# Patient Record
Sex: FEMALE | Race: WHITE | NOT HISPANIC OR LATINO | Employment: UNEMPLOYED | URBAN - METROPOLITAN AREA
[De-identification: names, ages, dates, MRNs, and addresses within clinical notes are randomized per-mention and may not be internally consistent; named-entity substitution may affect disease eponyms.]

---

## 2017-11-07 ENCOUNTER — APPOINTMENT (OUTPATIENT)
Dept: LAB | Facility: MEDICAL CENTER | Age: 52
End: 2017-11-07
Payer: COMMERCIAL

## 2017-11-07 ENCOUNTER — TRANSCRIBE ORDERS (OUTPATIENT)
Dept: ADMINISTRATIVE | Facility: HOSPITAL | Age: 52
End: 2017-11-07

## 2017-11-07 DIAGNOSIS — Z00.00 ROUTINE GENERAL MEDICAL EXAMINATION AT A HEALTH CARE FACILITY: Primary | ICD-10-CM

## 2017-11-07 DIAGNOSIS — E66.09 EXOGENOUS OBESITY: Primary | ICD-10-CM

## 2017-11-07 DIAGNOSIS — N23 KIDNEY PAIN: ICD-10-CM

## 2017-11-07 DIAGNOSIS — E55.9 VITAMIN D DEFICIENCY DISEASE: ICD-10-CM

## 2017-11-07 DIAGNOSIS — Z00.00 ROUTINE GENERAL MEDICAL EXAMINATION AT A HEALTH CARE FACILITY: ICD-10-CM

## 2017-11-07 LAB
25(OH)D3 SERPL-MCNC: 28.8 NG/ML (ref 30–100)
ALBUMIN SERPL BCP-MCNC: 4 G/DL (ref 3.5–5)
ALP SERPL-CCNC: 70 U/L (ref 46–116)
ALT SERPL W P-5'-P-CCNC: 30 U/L (ref 12–78)
ANION GAP SERPL CALCULATED.3IONS-SCNC: 5 MMOL/L (ref 4–13)
AST SERPL W P-5'-P-CCNC: 22 U/L (ref 5–45)
BACTERIA UR QL AUTO: ABNORMAL /HPF
BILIRUB SERPL-MCNC: 0.77 MG/DL (ref 0.2–1)
BILIRUB UR QL STRIP: NEGATIVE
BUN SERPL-MCNC: 20 MG/DL (ref 5–25)
CALCIUM SERPL-MCNC: 9.2 MG/DL (ref 8.3–10.1)
CHLORIDE SERPL-SCNC: 105 MMOL/L (ref 100–108)
CHOLEST SERPL-MCNC: 152 MG/DL (ref 50–200)
CLARITY UR: CLEAR
CO2 SERPL-SCNC: 28 MMOL/L (ref 21–32)
COLOR UR: YELLOW
CREAT SERPL-MCNC: 0.83 MG/DL (ref 0.6–1.3)
ERYTHROCYTE [DISTWIDTH] IN BLOOD BY AUTOMATED COUNT: 13 % (ref 11.6–15.1)
GFR SERPL CREATININE-BSD FRML MDRD: 81 ML/MIN/1.73SQ M
GLUCOSE P FAST SERPL-MCNC: 86 MG/DL (ref 65–99)
GLUCOSE UR STRIP-MCNC: NEGATIVE MG/DL
HCT VFR BLD AUTO: 40.7 % (ref 34.8–46.1)
HDLC SERPL-MCNC: 57 MG/DL (ref 40–60)
HGB BLD-MCNC: 13.8 G/DL (ref 11.5–15.4)
HGB UR QL STRIP.AUTO: NEGATIVE
HYALINE CASTS #/AREA URNS LPF: ABNORMAL /LPF
KETONES UR STRIP-MCNC: NEGATIVE MG/DL
LDLC SERPL CALC-MCNC: 82 MG/DL (ref 0–100)
LEUKOCYTE ESTERASE UR QL STRIP: ABNORMAL
MCH RBC QN AUTO: 31.2 PG (ref 26.8–34.3)
MCHC RBC AUTO-ENTMCNC: 33.9 G/DL (ref 31.4–37.4)
MCV RBC AUTO: 92 FL (ref 82–98)
NITRITE UR QL STRIP: NEGATIVE
NON-SQ EPI CELLS URNS QL MICRO: ABNORMAL /HPF
PH UR STRIP.AUTO: 6 [PH] (ref 4.5–8)
PLATELET # BLD AUTO: 217 THOUSANDS/UL (ref 149–390)
PMV BLD AUTO: 12.9 FL (ref 8.9–12.7)
POTASSIUM SERPL-SCNC: 4.2 MMOL/L (ref 3.5–5.3)
PROT SERPL-MCNC: 7.8 G/DL (ref 6.4–8.2)
PROT UR STRIP-MCNC: NEGATIVE MG/DL
RBC # BLD AUTO: 4.43 MILLION/UL (ref 3.81–5.12)
RBC #/AREA URNS AUTO: ABNORMAL /HPF
SODIUM SERPL-SCNC: 138 MMOL/L (ref 136–145)
SP GR UR STRIP.AUTO: 1.02 (ref 1–1.03)
T4 FREE SERPL-MCNC: 1 NG/DL (ref 0.76–1.46)
TRIGL SERPL-MCNC: 63 MG/DL
TSH SERPL DL<=0.05 MIU/L-ACNC: 6.79 UIU/ML (ref 0.36–3.74)
UROBILINOGEN UR QL STRIP.AUTO: 0.2 E.U./DL
WBC # BLD AUTO: 5.39 THOUSAND/UL (ref 4.31–10.16)
WBC #/AREA URNS AUTO: ABNORMAL /HPF

## 2017-11-07 PROCEDURE — 80053 COMPREHEN METABOLIC PANEL: CPT | Performed by: NURSE PRACTITIONER

## 2017-11-07 PROCEDURE — 84439 ASSAY OF FREE THYROXINE: CPT | Performed by: NURSE PRACTITIONER

## 2017-11-07 PROCEDURE — 84443 ASSAY THYROID STIM HORMONE: CPT | Performed by: NURSE PRACTITIONER

## 2017-11-07 PROCEDURE — 82306 VITAMIN D 25 HYDROXY: CPT | Performed by: NURSE PRACTITIONER

## 2017-11-07 PROCEDURE — 80061 LIPID PANEL: CPT | Performed by: NURSE PRACTITIONER

## 2017-11-07 PROCEDURE — 87086 URINE CULTURE/COLONY COUNT: CPT

## 2017-11-07 PROCEDURE — 81001 URINALYSIS AUTO W/SCOPE: CPT

## 2017-11-07 PROCEDURE — 85027 COMPLETE CBC AUTOMATED: CPT | Performed by: NURSE PRACTITIONER

## 2017-11-07 PROCEDURE — 36415 COLL VENOUS BLD VENIPUNCTURE: CPT | Performed by: NURSE PRACTITIONER

## 2017-11-08 LAB — BACTERIA UR CULT: NORMAL

## 2018-09-20 ENCOUNTER — TRANSCRIBE ORDERS (OUTPATIENT)
Dept: ADMINISTRATIVE | Facility: HOSPITAL | Age: 53
End: 2018-09-20

## 2018-09-20 ENCOUNTER — APPOINTMENT (OUTPATIENT)
Dept: LAB | Facility: MEDICAL CENTER | Age: 53
End: 2018-09-20
Payer: COMMERCIAL

## 2018-09-20 DIAGNOSIS — R39.15 URGENCY OF URINATION: ICD-10-CM

## 2018-09-20 DIAGNOSIS — R73.9 BLOOD GLUCOSE ELEVATED: Primary | ICD-10-CM

## 2018-09-20 LAB
ALBUMIN SERPL BCP-MCNC: 3.9 G/DL (ref 3.5–5)
ALP SERPL-CCNC: 78 U/L (ref 46–116)
ALT SERPL W P-5'-P-CCNC: 31 U/L (ref 12–78)
ANION GAP SERPL CALCULATED.3IONS-SCNC: 5 MMOL/L (ref 4–13)
AST SERPL W P-5'-P-CCNC: 24 U/L (ref 5–45)
BACTERIA UR QL AUTO: ABNORMAL /HPF
BASOPHILS # BLD AUTO: 0.03 THOUSANDS/ΜL (ref 0–0.1)
BASOPHILS NFR BLD AUTO: 1 % (ref 0–1)
BILIRUB SERPL-MCNC: 0.81 MG/DL (ref 0.2–1)
BILIRUB UR QL STRIP: NEGATIVE
BUN SERPL-MCNC: 19 MG/DL (ref 5–25)
CALCIUM SERPL-MCNC: 9.2 MG/DL (ref 8.3–10.1)
CHLORIDE SERPL-SCNC: 107 MMOL/L (ref 100–108)
CLARITY UR: ABNORMAL
CO2 SERPL-SCNC: 26 MMOL/L (ref 21–32)
COLOR UR: ABNORMAL
CREAT SERPL-MCNC: 0.82 MG/DL (ref 0.6–1.3)
EOSINOPHIL # BLD AUTO: 0.1 THOUSAND/ΜL (ref 0–0.61)
EOSINOPHIL NFR BLD AUTO: 2 % (ref 0–6)
ERYTHROCYTE [DISTWIDTH] IN BLOOD BY AUTOMATED COUNT: 12.3 % (ref 11.6–15.1)
GFR SERPL CREATININE-BSD FRML MDRD: 82 ML/MIN/1.73SQ M
GLUCOSE P FAST SERPL-MCNC: 93 MG/DL (ref 65–99)
GLUCOSE UR STRIP-MCNC: NEGATIVE MG/DL
HCT VFR BLD AUTO: 42.8 % (ref 34.8–46.1)
HGB BLD-MCNC: 14.2 G/DL (ref 11.5–15.4)
HGB UR QL STRIP.AUTO: NEGATIVE
IMM GRANULOCYTES # BLD AUTO: 0.02 THOUSAND/UL (ref 0–0.2)
IMM GRANULOCYTES NFR BLD AUTO: 0 % (ref 0–2)
KETONES UR STRIP-MCNC: NEGATIVE MG/DL
LEUKOCYTE ESTERASE UR QL STRIP: ABNORMAL
LYMPHOCYTES # BLD AUTO: 1.47 THOUSANDS/ΜL (ref 0.6–4.47)
LYMPHOCYTES NFR BLD AUTO: 27 % (ref 14–44)
MCH RBC QN AUTO: 30 PG (ref 26.8–34.3)
MCHC RBC AUTO-ENTMCNC: 33.2 G/DL (ref 31.4–37.4)
MCV RBC AUTO: 91 FL (ref 82–98)
MONOCYTES # BLD AUTO: 0.46 THOUSAND/ΜL (ref 0.17–1.22)
MONOCYTES NFR BLD AUTO: 9 % (ref 4–12)
NEUTROPHILS # BLD AUTO: 3.29 THOUSANDS/ΜL (ref 1.85–7.62)
NEUTS SEG NFR BLD AUTO: 61 % (ref 43–75)
NITRITE UR QL STRIP: NEGATIVE
NON-SQ EPI CELLS URNS QL MICRO: ABNORMAL /HPF
NRBC BLD AUTO-RTO: 0 /100 WBCS
PH UR STRIP.AUTO: 6 [PH] (ref 4.5–8)
PLATELET # BLD AUTO: 270 THOUSANDS/UL (ref 149–390)
PMV BLD AUTO: 11.5 FL (ref 8.9–12.7)
POTASSIUM SERPL-SCNC: 4.2 MMOL/L (ref 3.5–5.3)
PROT SERPL-MCNC: 7.9 G/DL (ref 6.4–8.2)
PROT UR STRIP-MCNC: ABNORMAL MG/DL
RBC # BLD AUTO: 4.73 MILLION/UL (ref 3.81–5.12)
RBC #/AREA URNS AUTO: ABNORMAL /HPF
SODIUM SERPL-SCNC: 138 MMOL/L (ref 136–145)
SP GR UR STRIP.AUTO: 1.02 (ref 1–1.03)
UROBILINOGEN UR QL STRIP.AUTO: 0.2 E.U./DL
WBC # BLD AUTO: 5.37 THOUSAND/UL (ref 4.31–10.16)
WBC #/AREA URNS AUTO: ABNORMAL /HPF

## 2018-09-20 PROCEDURE — 81001 URINALYSIS AUTO W/SCOPE: CPT | Performed by: NURSE PRACTITIONER

## 2018-09-20 PROCEDURE — 87086 URINE CULTURE/COLONY COUNT: CPT | Performed by: NURSE PRACTITIONER

## 2018-09-20 PROCEDURE — 85025 COMPLETE CBC W/AUTO DIFF WBC: CPT | Performed by: NURSE PRACTITIONER

## 2018-09-20 PROCEDURE — 80053 COMPREHEN METABOLIC PANEL: CPT | Performed by: NURSE PRACTITIONER

## 2018-09-20 PROCEDURE — 36415 COLL VENOUS BLD VENIPUNCTURE: CPT | Performed by: NURSE PRACTITIONER

## 2018-09-20 PROCEDURE — 83036 HEMOGLOBIN GLYCOSYLATED A1C: CPT | Performed by: NURSE PRACTITIONER

## 2018-09-21 LAB
BACTERIA UR CULT: NORMAL
EST. AVERAGE GLUCOSE BLD GHB EST-MCNC: 108 MG/DL
HBA1C MFR BLD: 5.4 % (ref 4.2–6.3)

## 2019-08-16 ENCOUNTER — TRANSCRIBE ORDERS (OUTPATIENT)
Dept: ADMINISTRATIVE | Facility: HOSPITAL | Age: 54
End: 2019-08-16

## 2019-08-16 ENCOUNTER — APPOINTMENT (OUTPATIENT)
Dept: LAB | Facility: MEDICAL CENTER | Age: 54
End: 2019-08-16
Payer: COMMERCIAL

## 2019-08-16 DIAGNOSIS — Z13.1 SCREENING FOR DIABETES MELLITUS: ICD-10-CM

## 2019-08-16 DIAGNOSIS — E03.9 HYPOTHYROIDISM, UNSPECIFIED TYPE: Primary | ICD-10-CM

## 2019-08-16 DIAGNOSIS — D50.9 IRON DEFICIENCY ANEMIA, UNSPECIFIED IRON DEFICIENCY ANEMIA TYPE: ICD-10-CM

## 2019-08-16 DIAGNOSIS — D64.9 ANEMIA, UNSPECIFIED TYPE: ICD-10-CM

## 2019-08-16 LAB
ALBUMIN SERPL BCP-MCNC: 4.1 G/DL (ref 3.5–5)
ALP SERPL-CCNC: 80 U/L (ref 46–116)
ALT SERPL W P-5'-P-CCNC: 26 U/L (ref 12–78)
ANION GAP SERPL CALCULATED.3IONS-SCNC: 7 MMOL/L (ref 4–13)
AST SERPL W P-5'-P-CCNC: 18 U/L (ref 5–45)
BILIRUB DIRECT SERPL-MCNC: 0.21 MG/DL (ref 0–0.2)
BILIRUB SERPL-MCNC: 0.81 MG/DL (ref 0.2–1)
BUN SERPL-MCNC: 17 MG/DL (ref 5–25)
CALCIUM SERPL-MCNC: 9.3 MG/DL (ref 8.3–10.1)
CHLORIDE SERPL-SCNC: 108 MMOL/L (ref 100–108)
CO2 SERPL-SCNC: 26 MMOL/L (ref 21–32)
CREAT SERPL-MCNC: 0.79 MG/DL (ref 0.6–1.3)
ERYTHROCYTE [DISTWIDTH] IN BLOOD BY AUTOMATED COUNT: 12.9 % (ref 11.6–15.1)
GFR SERPL CREATININE-BSD FRML MDRD: 85 ML/MIN/1.73SQ M
GLUCOSE SERPL-MCNC: 81 MG/DL (ref 65–140)
HCT VFR BLD AUTO: 41.4 % (ref 34.8–46.1)
HGB BLD-MCNC: 13.5 G/DL (ref 11.5–15.4)
MCH RBC QN AUTO: 30.5 PG (ref 26.8–34.3)
MCHC RBC AUTO-ENTMCNC: 32.6 G/DL (ref 31.4–37.4)
MCV RBC AUTO: 94 FL (ref 82–98)
PLATELET # BLD AUTO: 245 THOUSANDS/UL (ref 149–390)
PMV BLD AUTO: 12.3 FL (ref 8.9–12.7)
POTASSIUM SERPL-SCNC: 4.1 MMOL/L (ref 3.5–5.3)
PROT SERPL-MCNC: 7.7 G/DL (ref 6.4–8.2)
RBC # BLD AUTO: 4.42 MILLION/UL (ref 3.81–5.12)
SODIUM SERPL-SCNC: 141 MMOL/L (ref 136–145)
TSH SERPL DL<=0.05 MIU/L-ACNC: 2.38 UIU/ML (ref 0.36–3.74)
WBC # BLD AUTO: 10.98 THOUSAND/UL (ref 4.31–10.16)

## 2019-08-16 PROCEDURE — 84443 ASSAY THYROID STIM HORMONE: CPT | Performed by: INTERNAL MEDICINE

## 2019-08-16 PROCEDURE — 36415 COLL VENOUS BLD VENIPUNCTURE: CPT | Performed by: INTERNAL MEDICINE

## 2019-08-16 PROCEDURE — 85027 COMPLETE CBC AUTOMATED: CPT | Performed by: INTERNAL MEDICINE

## 2019-08-16 PROCEDURE — 80076 HEPATIC FUNCTION PANEL: CPT | Performed by: INTERNAL MEDICINE

## 2019-08-16 PROCEDURE — 80048 BASIC METABOLIC PNL TOTAL CA: CPT | Performed by: INTERNAL MEDICINE

## 2020-11-10 ENCOUNTER — OFFICE VISIT (OUTPATIENT)
Dept: FAMILY MEDICINE CLINIC | Facility: CLINIC | Age: 55
End: 2020-11-10
Payer: COMMERCIAL

## 2020-11-10 VITALS
SYSTOLIC BLOOD PRESSURE: 132 MMHG | OXYGEN SATURATION: 98 % | HEART RATE: 75 BPM | RESPIRATION RATE: 18 BRPM | DIASTOLIC BLOOD PRESSURE: 88 MMHG | WEIGHT: 293 LBS | HEIGHT: 63 IN | TEMPERATURE: 97.9 F | BODY MASS INDEX: 51.91 KG/M2

## 2020-11-10 DIAGNOSIS — G89.29 CHRONIC KNEE PAIN, UNSPECIFIED LATERALITY: ICD-10-CM

## 2020-11-10 DIAGNOSIS — R73.9 HYPERGLYCEMIA: ICD-10-CM

## 2020-11-10 DIAGNOSIS — G89.29 OTHER CHRONIC PAIN: ICD-10-CM

## 2020-11-10 DIAGNOSIS — E66.01 CLASS 3 SEVERE OBESITY WITH BODY MASS INDEX (BMI) OF 60.0 TO 69.9 IN ADULT, UNSPECIFIED OBESITY TYPE, UNSPECIFIED WHETHER SERIOUS COMORBIDITY PRESENT (HCC): Primary | ICD-10-CM

## 2020-11-10 DIAGNOSIS — M25.569 CHRONIC KNEE PAIN, UNSPECIFIED LATERALITY: ICD-10-CM

## 2020-11-10 DIAGNOSIS — M79.7 FIBROMYALGIA: ICD-10-CM

## 2020-11-10 DIAGNOSIS — R53.83 OTHER FATIGUE: ICD-10-CM

## 2020-11-10 PROCEDURE — 99204 OFFICE O/P NEW MOD 45 MIN: CPT | Performed by: NURSE PRACTITIONER

## 2020-11-10 PROCEDURE — 3725F SCREEN DEPRESSION PERFORMED: CPT | Performed by: NURSE PRACTITIONER

## 2020-11-10 RX ORDER — DULOXETINE 40 MG/1
40 CAPSULE, DELAYED RELEASE ORAL
COMMUNITY
End: 2020-11-25 | Stop reason: SDUPTHER

## 2020-11-10 RX ORDER — ALBUTEROL SULFATE 2.5 MG/3ML
1 SOLUTION RESPIRATORY (INHALATION)
COMMUNITY
Start: 2013-02-19

## 2020-11-10 RX ORDER — IPRATROPIUM/ALBUTEROL SULFATE 20-100 MCG
MIST INHALER (GRAM) INHALATION
COMMUNITY
Start: 2015-07-09

## 2020-11-10 RX ORDER — TRAMADOL HYDROCHLORIDE 50 MG/1
1 TABLET ORAL EVERY 6 HOURS PRN
COMMUNITY
End: 2021-02-26 | Stop reason: SDUPTHER

## 2020-11-11 ENCOUNTER — LAB (OUTPATIENT)
Dept: LAB | Facility: MEDICAL CENTER | Age: 55
End: 2020-11-11
Payer: COMMERCIAL

## 2020-11-11 DIAGNOSIS — E66.01 CLASS 3 SEVERE OBESITY WITH BODY MASS INDEX (BMI) OF 60.0 TO 69.9 IN ADULT, UNSPECIFIED OBESITY TYPE, UNSPECIFIED WHETHER SERIOUS COMORBIDITY PRESENT (HCC): ICD-10-CM

## 2020-11-11 DIAGNOSIS — R73.9 HYPERGLYCEMIA: ICD-10-CM

## 2020-11-11 DIAGNOSIS — R53.83 OTHER FATIGUE: ICD-10-CM

## 2020-11-11 LAB
ALBUMIN SERPL BCP-MCNC: 4 G/DL (ref 3.5–5)
ALP SERPL-CCNC: 74 U/L (ref 46–116)
ALT SERPL W P-5'-P-CCNC: 33 U/L (ref 12–78)
ANION GAP SERPL CALCULATED.3IONS-SCNC: 3 MMOL/L (ref 4–13)
AST SERPL W P-5'-P-CCNC: 24 U/L (ref 5–45)
BASOPHILS # BLD AUTO: 0.06 THOUSANDS/ΜL (ref 0–0.1)
BASOPHILS NFR BLD AUTO: 1 % (ref 0–1)
BILIRUB SERPL-MCNC: 0.71 MG/DL (ref 0.2–1)
BUN SERPL-MCNC: 18 MG/DL (ref 5–25)
CALCIUM SERPL-MCNC: 9.9 MG/DL (ref 8.3–10.1)
CHLORIDE SERPL-SCNC: 107 MMOL/L (ref 100–108)
CHOLEST SERPL-MCNC: 162 MG/DL (ref 50–200)
CO2 SERPL-SCNC: 30 MMOL/L (ref 21–32)
CREAT SERPL-MCNC: 1.02 MG/DL (ref 0.6–1.3)
CREAT UR-MCNC: 229 MG/DL
EOSINOPHIL # BLD AUTO: 0.19 THOUSAND/ΜL (ref 0–0.61)
EOSINOPHIL NFR BLD AUTO: 3 % (ref 0–6)
ERYTHROCYTE [DISTWIDTH] IN BLOOD BY AUTOMATED COUNT: 12.8 % (ref 11.6–15.1)
EST. AVERAGE GLUCOSE BLD GHB EST-MCNC: 114 MG/DL
GFR SERPL CREATININE-BSD FRML MDRD: 62 ML/MIN/1.73SQ M
GLUCOSE P FAST SERPL-MCNC: 93 MG/DL (ref 65–99)
HBA1C MFR BLD: 5.6 %
HCT VFR BLD AUTO: 43.8 % (ref 34.8–46.1)
HDLC SERPL-MCNC: 65 MG/DL
HGB BLD-MCNC: 14.4 G/DL (ref 11.5–15.4)
IMM GRANULOCYTES # BLD AUTO: 0.03 THOUSAND/UL (ref 0–0.2)
IMM GRANULOCYTES NFR BLD AUTO: 1 % (ref 0–2)
LDLC SERPL CALC-MCNC: 84 MG/DL (ref 0–100)
LYMPHOCYTES # BLD AUTO: 1.73 THOUSANDS/ΜL (ref 0.6–4.47)
LYMPHOCYTES NFR BLD AUTO: 28 % (ref 14–44)
MCH RBC QN AUTO: 31.2 PG (ref 26.8–34.3)
MCHC RBC AUTO-ENTMCNC: 32.9 G/DL (ref 31.4–37.4)
MCV RBC AUTO: 95 FL (ref 82–98)
MICROALBUMIN UR-MCNC: 8 MG/L (ref 0–20)
MICROALBUMIN/CREAT 24H UR: 3 MG/G CREATININE (ref 0–30)
MONOCYTES # BLD AUTO: 0.56 THOUSAND/ΜL (ref 0.17–1.22)
MONOCYTES NFR BLD AUTO: 9 % (ref 4–12)
NEUTROPHILS # BLD AUTO: 3.71 THOUSANDS/ΜL (ref 1.85–7.62)
NEUTS SEG NFR BLD AUTO: 58 % (ref 43–75)
NONHDLC SERPL-MCNC: 97 MG/DL
NRBC BLD AUTO-RTO: 0 /100 WBCS
PLATELET # BLD AUTO: 260 THOUSANDS/UL (ref 149–390)
PMV BLD AUTO: 11.4 FL (ref 8.9–12.7)
POTASSIUM SERPL-SCNC: 4.2 MMOL/L (ref 3.5–5.3)
PROT SERPL-MCNC: 7.9 G/DL (ref 6.4–8.2)
RBC # BLD AUTO: 4.62 MILLION/UL (ref 3.81–5.12)
SODIUM SERPL-SCNC: 140 MMOL/L (ref 136–145)
T4 FREE SERPL-MCNC: 0.87 NG/DL (ref 0.76–1.46)
TRIGL SERPL-MCNC: 63 MG/DL
TSH SERPL DL<=0.05 MIU/L-ACNC: 6.24 UIU/ML (ref 0.36–3.74)
WBC # BLD AUTO: 6.28 THOUSAND/UL (ref 4.31–10.16)

## 2020-11-11 PROCEDURE — 80061 LIPID PANEL: CPT

## 2020-11-11 PROCEDURE — 85025 COMPLETE CBC W/AUTO DIFF WBC: CPT

## 2020-11-11 PROCEDURE — 84439 ASSAY OF FREE THYROXINE: CPT

## 2020-11-11 PROCEDURE — 84443 ASSAY THYROID STIM HORMONE: CPT

## 2020-11-11 PROCEDURE — 80053 COMPREHEN METABOLIC PANEL: CPT

## 2020-11-11 PROCEDURE — 82043 UR ALBUMIN QUANTITATIVE: CPT | Performed by: NURSE PRACTITIONER

## 2020-11-11 PROCEDURE — 36415 COLL VENOUS BLD VENIPUNCTURE: CPT

## 2020-11-11 PROCEDURE — 83036 HEMOGLOBIN GLYCOSYLATED A1C: CPT

## 2020-11-11 PROCEDURE — 82570 ASSAY OF URINE CREATININE: CPT | Performed by: NURSE PRACTITIONER

## 2020-11-17 ENCOUNTER — OFFICE VISIT (OUTPATIENT)
Dept: FAMILY MEDICINE CLINIC | Facility: CLINIC | Age: 55
End: 2020-11-17
Payer: COMMERCIAL

## 2020-11-17 VITALS
HEART RATE: 69 BPM | WEIGHT: 293 LBS | RESPIRATION RATE: 16 BRPM | TEMPERATURE: 97.5 F | OXYGEN SATURATION: 97 % | DIASTOLIC BLOOD PRESSURE: 82 MMHG | SYSTOLIC BLOOD PRESSURE: 122 MMHG | BODY MASS INDEX: 51.91 KG/M2 | HEIGHT: 63 IN

## 2020-11-17 DIAGNOSIS — E01.0 THYROMEGALY: ICD-10-CM

## 2020-11-17 DIAGNOSIS — L71.9 ROSACEA, ACNE: ICD-10-CM

## 2020-11-17 DIAGNOSIS — Z09 FOLLOW UP: Primary | ICD-10-CM

## 2020-11-17 DIAGNOSIS — E03.9 HYPOTHYROIDISM, UNSPECIFIED TYPE: ICD-10-CM

## 2020-11-17 DIAGNOSIS — R06.2 WHEEZING ON INSPIRATION: ICD-10-CM

## 2020-11-17 PROCEDURE — 1036F TOBACCO NON-USER: CPT | Performed by: NURSE PRACTITIONER

## 2020-11-17 PROCEDURE — 3008F BODY MASS INDEX DOCD: CPT | Performed by: NURSE PRACTITIONER

## 2020-11-17 PROCEDURE — 99214 OFFICE O/P EST MOD 30 MIN: CPT | Performed by: NURSE PRACTITIONER

## 2020-11-17 RX ORDER — ALBUTEROL SULFATE 90 UG/1
2 AEROSOL, METERED RESPIRATORY (INHALATION) EVERY 6 HOURS PRN
Qty: 2 INHALER | Refills: 1 | Status: SHIPPED | OUTPATIENT
Start: 2020-11-17

## 2020-11-17 RX ORDER — CLINDAMYCIN PHOSPHATE 10 MG/G
GEL TOPICAL 2 TIMES DAILY
Qty: 30 G | Refills: 1 | Status: SHIPPED | OUTPATIENT
Start: 2020-11-17 | End: 2021-02-26 | Stop reason: CLARIF

## 2020-11-17 RX ORDER — LEVOTHYROXINE SODIUM 0.05 MG/1
50 TABLET ORAL
Qty: 90 TABLET | Refills: 1 | Status: SHIPPED | OUTPATIENT
Start: 2020-11-17 | End: 2021-05-14

## 2020-11-25 ENCOUNTER — HOSPITAL ENCOUNTER (OUTPATIENT)
Dept: RADIOLOGY | Facility: MEDICAL CENTER | Age: 55
Discharge: HOME | End: 2020-11-25
Payer: COMMERCIAL

## 2020-11-25 DIAGNOSIS — E03.9 HYPOTHYROIDISM, UNSPECIFIED TYPE: ICD-10-CM

## 2020-11-25 DIAGNOSIS — F32.0 CURRENT MILD EPISODE OF MAJOR DEPRESSIVE DISORDER, UNSPECIFIED WHETHER RECURRENT (HCC): Primary | ICD-10-CM

## 2020-11-25 DIAGNOSIS — E01.0 THYROMEGALY: ICD-10-CM

## 2020-11-25 PROCEDURE — 76536 US EXAM OF HEAD AND NECK: CPT

## 2020-11-25 RX ORDER — DULOXETINE 40 MG/1
40 CAPSULE, DELAYED RELEASE ORAL DAILY
Qty: 30 CAPSULE | Refills: 1 | Status: SHIPPED | OUTPATIENT
Start: 2020-11-25 | End: 2021-01-28 | Stop reason: SDUPTHER

## 2020-12-09 ENCOUNTER — TELEMEDICINE (OUTPATIENT)
Dept: BARIATRICS | Facility: CLINIC | Age: 55
End: 2020-12-09
Payer: COMMERCIAL

## 2020-12-09 VITALS — WEIGHT: 293 LBS | HEIGHT: 63 IN | BODY MASS INDEX: 51.91 KG/M2

## 2020-12-09 DIAGNOSIS — E03.9 HYPOTHYROID: ICD-10-CM

## 2020-12-09 DIAGNOSIS — M79.7 FIBROMYALGIA: ICD-10-CM

## 2020-12-09 DIAGNOSIS — G47.33 OSA (OBSTRUCTIVE SLEEP APNEA): ICD-10-CM

## 2020-12-09 DIAGNOSIS — E66.01 MORBID OBESITY WITH BMI OF 60.0-69.9, ADULT (HCC): Primary | ICD-10-CM

## 2020-12-09 DIAGNOSIS — F41.9 ANXIETY: ICD-10-CM

## 2020-12-09 DIAGNOSIS — F32.A DEPRESSION: ICD-10-CM

## 2020-12-09 DIAGNOSIS — J45.909 ASTHMA: ICD-10-CM

## 2020-12-09 DIAGNOSIS — J44.9 COPD (CHRONIC OBSTRUCTIVE PULMONARY DISEASE) (HCC): ICD-10-CM

## 2020-12-09 DIAGNOSIS — K21.9 GERD (GASTROESOPHAGEAL REFLUX DISEASE): ICD-10-CM

## 2020-12-09 PROCEDURE — 3008F BODY MASS INDEX DOCD: CPT | Performed by: INTERNAL MEDICINE

## 2020-12-09 PROCEDURE — 99244 OFF/OP CNSLTJ NEW/EST MOD 40: CPT | Performed by: INTERNAL MEDICINE

## 2020-12-09 PROCEDURE — 1036F TOBACCO NON-USER: CPT | Performed by: INTERNAL MEDICINE

## 2020-12-09 RX ORDER — OMEPRAZOLE 20 MG/1
20 CAPSULE, DELAYED RELEASE ORAL DAILY
COMMUNITY

## 2021-01-27 DIAGNOSIS — F32.0 CURRENT MILD EPISODE OF MAJOR DEPRESSIVE DISORDER, UNSPECIFIED WHETHER RECURRENT (HCC): ICD-10-CM

## 2021-01-28 DIAGNOSIS — F32.0 CURRENT MILD EPISODE OF MAJOR DEPRESSIVE DISORDER, UNSPECIFIED WHETHER RECURRENT (HCC): ICD-10-CM

## 2021-01-28 RX ORDER — DULOXETINE 40 MG/1
40 CAPSULE, DELAYED RELEASE ORAL DAILY
Qty: 90 CAPSULE | Refills: 1 | Status: SHIPPED | OUTPATIENT
Start: 2021-01-28 | End: 2021-05-26 | Stop reason: SDUPTHER

## 2021-01-28 RX ORDER — DULOXETINE 40 MG/1
CAPSULE, DELAYED RELEASE ORAL
Qty: 15 CAPSULE | Refills: 3 | OUTPATIENT
Start: 2021-01-28

## 2021-02-17 ENCOUNTER — LAB (OUTPATIENT)
Dept: LAB | Facility: MEDICAL CENTER | Age: 56
End: 2021-02-17
Payer: COMMERCIAL

## 2021-02-17 DIAGNOSIS — E03.9 HYPOTHYROIDISM, UNSPECIFIED TYPE: ICD-10-CM

## 2021-02-17 DIAGNOSIS — E66.01 MORBID OBESITY WITH BMI OF 60.0-69.9, ADULT (HCC): ICD-10-CM

## 2021-02-17 LAB
25(OH)D3 SERPL-MCNC: 24.8 NG/ML (ref 30–100)
T4 FREE SERPL-MCNC: 1.03 NG/DL (ref 0.76–1.46)
TSH SERPL DL<=0.05 MIU/L-ACNC: 3.84 UIU/ML (ref 0.36–3.74)

## 2021-02-17 PROCEDURE — 36415 COLL VENOUS BLD VENIPUNCTURE: CPT

## 2021-02-17 PROCEDURE — 86376 MICROSOMAL ANTIBODY EACH: CPT

## 2021-02-17 PROCEDURE — 84439 ASSAY OF FREE THYROXINE: CPT

## 2021-02-17 PROCEDURE — 82306 VITAMIN D 25 HYDROXY: CPT

## 2021-02-17 PROCEDURE — 84443 ASSAY THYROID STIM HORMONE: CPT

## 2021-02-18 LAB — THYROPEROXIDASE AB SERPL-ACNC: <9 IU/ML (ref 0–34)

## 2021-02-26 ENCOUNTER — OFFICE VISIT (OUTPATIENT)
Dept: FAMILY MEDICINE CLINIC | Facility: CLINIC | Age: 56
End: 2021-02-26
Payer: COMMERCIAL

## 2021-02-26 VITALS
SYSTOLIC BLOOD PRESSURE: 116 MMHG | DIASTOLIC BLOOD PRESSURE: 64 MMHG | HEIGHT: 63 IN | OXYGEN SATURATION: 96 % | WEIGHT: 293 LBS | TEMPERATURE: 98.4 F | BODY MASS INDEX: 51.91 KG/M2 | RESPIRATION RATE: 16 BRPM | HEART RATE: 82 BPM

## 2021-02-26 DIAGNOSIS — Z00.00 ANNUAL PHYSICAL EXAM: Primary | ICD-10-CM

## 2021-02-26 DIAGNOSIS — E66.01 CLASS 3 SEVERE OBESITY WITHOUT SERIOUS COMORBIDITY WITH BODY MASS INDEX (BMI) OF 60.0 TO 69.9 IN ADULT, UNSPECIFIED OBESITY TYPE (HCC): ICD-10-CM

## 2021-02-26 DIAGNOSIS — M54.40 LOW BACK PAIN WITH SCIATICA, SCIATICA LATERALITY UNSPECIFIED, UNSPECIFIED BACK PAIN LATERALITY, UNSPECIFIED CHRONICITY: Primary | ICD-10-CM

## 2021-02-26 DIAGNOSIS — Z12.4 SCREENING FOR CERVICAL CANCER: ICD-10-CM

## 2021-02-26 DIAGNOSIS — Z12.11 SCREENING FOR COLORECTAL CANCER: ICD-10-CM

## 2021-02-26 DIAGNOSIS — Z23 ENCOUNTER FOR IMMUNIZATION: ICD-10-CM

## 2021-02-26 DIAGNOSIS — Z12.31 ENCOUNTER FOR SCREENING MAMMOGRAM FOR MALIGNANT NEOPLASM OF BREAST: ICD-10-CM

## 2021-02-26 DIAGNOSIS — Z11.59 NEED FOR HEPATITIS C SCREENING TEST: ICD-10-CM

## 2021-02-26 DIAGNOSIS — Z11.4 SCREENING FOR HIV (HUMAN IMMUNODEFICIENCY VIRUS): ICD-10-CM

## 2021-02-26 DIAGNOSIS — Z12.12 SCREENING FOR COLORECTAL CANCER: ICD-10-CM

## 2021-02-26 PROCEDURE — 90471 IMMUNIZATION ADMIN: CPT | Performed by: NURSE PRACTITIONER

## 2021-02-26 PROCEDURE — 3725F SCREEN DEPRESSION PERFORMED: CPT | Performed by: NURSE PRACTITIONER

## 2021-02-26 PROCEDURE — 90715 TDAP VACCINE 7 YRS/> IM: CPT | Performed by: NURSE PRACTITIONER

## 2021-02-26 PROCEDURE — 99396 PREV VISIT EST AGE 40-64: CPT | Performed by: NURSE PRACTITIONER

## 2021-02-26 RX ORDER — TRAMADOL HYDROCHLORIDE 50 MG/1
50 TABLET ORAL EVERY 6 HOURS PRN
Qty: 120 TABLET | Refills: 0 | Status: SHIPPED | OUTPATIENT
Start: 2021-02-26

## 2021-02-26 NOTE — PROGRESS NOTES
Assessment/Plan:    Physical assessment was no change since last office visit  Medication were reviewed and labs reviewed did demonstrate that patient's thyroid function is being brought more to a normal range with the 50 mcg levo Synthroid  Patient had some questions concerning other health concerns advised we will make a follow-up appointment to address those this is a focused visit for health screening  Routine labs will be completed 3 months from now approximately 1-2 weeks prior to her next visit  All questions were answered and patient verbalized agreement and understanding of this plan of care  Office visit was very well and patient was satisfied with the outcome  Problem List Items Addressed This Visit     None      Visit Diagnoses     Annual physical exam    -  Primary    Need for hepatitis C screening test        Screening for HIV (human immunodeficiency virus)        Encounter for immunization        Screening for cervical cancer        Screening for colorectal cancer                Subjective:      Patient ID: Nathen Read is a 63 y o  female  Patient is here for yearly physical   Of will review her most recent labs and put orders in for refill medication if absolutely necessary  Most orders today will be of the screening nature for health maintenance  Patient denies any nausea vomiting diarrhea chest pains or shortness of breath  The following portions of the patient's history were reviewed and updated as appropriate: allergies, current medications, past family history, past medical history, past social history, past surgical history and problem list     Review of Systems   Constitutional: Negative for appetite change and fever  HENT: Negative for sinus pressure and sore throat  Eyes: Negative for pain  Respiratory: Negative for shortness of breath  Cardiovascular: Negative for chest pain  Gastrointestinal: Negative for abdominal pain     Genitourinary: Negative for dysuria  Musculoskeletal: Negative for arthralgias and myalgias  Skin: Negative for color change  Neurological: Positive for headaches  Negative for light-headedness  Psychiatric/Behavioral: Negative for behavioral problems  Objective:      /64 (BP Location: Left arm, Patient Position: Sitting, Cuff Size: Large)   Pulse 82   Temp 98 4 °F (36 9 °C) (Temporal)   Resp 16   Ht 5' 3" (1 6 m)   Wt (!) 166 kg (367 lb)   SpO2 96%   BMI 65 01 kg/m²          Physical Exam  Vitals signs and nursing note reviewed  Constitutional:       General: She is not in acute distress  Appearance: She is well-developed  She is obese  She is not diaphoretic  HENT:      Head: Normocephalic and atraumatic  Right Ear: External ear normal       Left Ear: External ear normal    Eyes:      Pupils: Pupils are equal, round, and reactive to light  Neck:      Musculoskeletal: Normal range of motion and neck supple  Cardiovascular:      Rate and Rhythm: Normal rate and regular rhythm  Pulses: Normal pulses  Heart sounds: Normal heart sounds  Pulmonary:      Effort: Pulmonary effort is normal       Breath sounds: Normal breath sounds  Abdominal:      Palpations: Abdomen is soft  Musculoskeletal: Normal range of motion  Skin:     General: Skin is dry  Neurological:      General: No focal deficit present  Mental Status: She is alert and oriented to person, place, and time  Psychiatric:         Behavior: Behavior normal          Thought Content:  Thought content normal

## 2021-03-18 ENCOUNTER — OFFICE VISIT (OUTPATIENT)
Dept: OBGYN CLINIC | Facility: MEDICAL CENTER | Age: 56
End: 2021-03-18
Payer: COMMERCIAL

## 2021-03-18 VITALS
BODY MASS INDEX: 51.91 KG/M2 | WEIGHT: 293 LBS | DIASTOLIC BLOOD PRESSURE: 82 MMHG | HEIGHT: 63 IN | SYSTOLIC BLOOD PRESSURE: 126 MMHG

## 2021-03-18 DIAGNOSIS — N64.4 BREAST TENDERNESS IN FEMALE: ICD-10-CM

## 2021-03-18 DIAGNOSIS — Z01.419 ENCOUNTER FOR GYNECOLOGICAL EXAMINATION (GENERAL) (ROUTINE) WITHOUT ABNORMAL FINDINGS: Primary | ICD-10-CM

## 2021-03-18 DIAGNOSIS — R10.2 RIGHT ADNEXAL TENDERNESS: ICD-10-CM

## 2021-03-18 PROCEDURE — 99386 PREV VISIT NEW AGE 40-64: CPT | Performed by: STUDENT IN AN ORGANIZED HEALTH CARE EDUCATION/TRAINING PROGRAM

## 2021-03-18 PROCEDURE — 1036F TOBACCO NON-USER: CPT | Performed by: STUDENT IN AN ORGANIZED HEALTH CARE EDUCATION/TRAINING PROGRAM

## 2021-03-18 PROCEDURE — 0503F POSTPARTUM CARE VISIT: CPT | Performed by: STUDENT IN AN ORGANIZED HEALTH CARE EDUCATION/TRAINING PROGRAM

## 2021-03-18 PROCEDURE — 87624 HPV HI-RISK TYP POOLED RSLT: CPT | Performed by: STUDENT IN AN ORGANIZED HEALTH CARE EDUCATION/TRAINING PROGRAM

## 2021-03-18 PROCEDURE — G0145 SCR C/V CYTO,THINLAYER,RESCR: HCPCS | Performed by: STUDENT IN AN ORGANIZED HEALTH CARE EDUCATION/TRAINING PROGRAM

## 2021-03-18 NOTE — PROGRESS NOTES
Assessment/Plan:    62 yo  - annual exam   F/u in 1 yr or PRN  Problem List Items Addressed This Visit    Visit Diagnoses     Encounter for gynecological examination (general) (routine) without abnormal findings    -  Primary    Liquid-based pap, screening    Right adnexal tenderness        US pelvis complete w transvaginal    Breast tenderness in female        Relevant Orders    Mammo diagnostic bilateral w 3d & cad            Subjective:      Patient ID: Andrea Velásquez is a 63 y o  female  This is a 63 y o  postmenopausal  who presents for annual exam  She is s/p an abdominal "partial hysterectomy" in the   She is unsure if she still has a cervix  She still has both ovaries  She has some spotting when she wipes  Right sided pain and h/o ovarian cysts  She does have urinary urgency  She and her partner do not have regular intercourse  Last pap smear: 2015 neg/neg  No h/o abnormal   Last mammogram:  negative  Has one scheduled for   Colon Cancer screening: unsure when to f/u - had one a few years ago  Has cologuard for repeat screening  Keitha Romberg has struggled with her weight throughout her life  She has been on several diets and diet pills  She has been to weight management but disappointed that they only discussed bariatric surgery  She has an appt with a nutritionist in April to discuss diet  She reports feeling very self consious and her weight affects most aspects of her life  The following portions of the patient's history were reviewed and updated as appropriate: allergies, current medications, past family history, past medical history, past social history, past surgical history and problem list     Review of Systems   Constitutional: Negative  HENT: Negative  Eyes: Negative  Respiratory: Negative  Cardiovascular: Negative  Gastrointestinal: Negative  Endocrine: Negative  Genitourinary: Positive for pelvic pain and urgency   Negative for dyspareunia, dysuria, frequency, menstrual problem, vaginal discharge and vaginal pain  Musculoskeletal: Negative  Skin: Negative  Allergic/Immunologic: Negative  Neurological: Negative  Hematological: Negative  Psychiatric/Behavioral: Negative  Objective:      /82 (BP Location: Left arm, Patient Position: Sitting, Cuff Size: Large)   Ht 5' 3" (1 6 m)   Wt (!) 168 kg (371 lb)   BMI 65 72 kg/m²          Physical Exam  Vitals signs reviewed  Neck:      Musculoskeletal: Normal range of motion  Cardiovascular:      Rate and Rhythm: Normal rate  Pulmonary:      Effort: Pulmonary effort is normal    Chest:      Breasts: Breasts are symmetrical          Right: Tenderness present  No swelling, bleeding, inverted nipple, mass, nipple discharge or skin change  Left: Tenderness present  No swelling, bleeding, inverted nipple, mass, nipple discharge or skin change  Abdominal:      Palpations: Abdomen is soft  Tenderness: There is abdominal tenderness in the right lower quadrant  Genitourinary:     Labia:         Right: No rash  Left: No rash  Vagina: Normal  No signs of injury  Uterus: Not enlarged and not fixed  Adnexa:         Right: No mass  Left: No mass  Comments: Cervix not visualieed  Skin:     General: Skin is warm and dry  Neurological:      Mental Status: She is alert and oriented to person, place, and time

## 2021-03-19 LAB
HPV HR 12 DNA CVX QL NAA+PROBE: NEGATIVE
HPV16 DNA CVX QL NAA+PROBE: NEGATIVE
HPV18 DNA CVX QL NAA+PROBE: NEGATIVE

## 2021-03-25 LAB
LAB AP GYN PRIMARY INTERPRETATION: NORMAL
Lab: NORMAL

## 2021-03-26 ENCOUNTER — TELEPHONE (OUTPATIENT)
Dept: OBGYN CLINIC | Facility: CLINIC | Age: 56
End: 2021-03-26

## 2021-03-26 NOTE — TELEPHONE ENCOUNTER
----- Message from Elli Menjivar MD sent at 3/25/2021  6:18 PM EDT -----  No my chart  Please notify  normal pap and hpv

## 2021-04-01 ENCOUNTER — OFFICE VISIT (OUTPATIENT)
Dept: BARIATRICS | Facility: CLINIC | Age: 56
End: 2021-04-01
Payer: COMMERCIAL

## 2021-04-01 VITALS
TEMPERATURE: 97.6 F | SYSTOLIC BLOOD PRESSURE: 118 MMHG | HEART RATE: 77 BPM | WEIGHT: 293 LBS | BODY MASS INDEX: 51.91 KG/M2 | HEIGHT: 63 IN | DIASTOLIC BLOOD PRESSURE: 84 MMHG

## 2021-04-01 DIAGNOSIS — F32.A DEPRESSION: ICD-10-CM

## 2021-04-01 DIAGNOSIS — J44.9 COPD (CHRONIC OBSTRUCTIVE PULMONARY DISEASE) (HCC): ICD-10-CM

## 2021-04-01 DIAGNOSIS — E03.9 HYPOTHYROID: ICD-10-CM

## 2021-04-01 DIAGNOSIS — K21.9 GERD (GASTROESOPHAGEAL REFLUX DISEASE): ICD-10-CM

## 2021-04-01 DIAGNOSIS — E66.01 MORBID OBESITY WITH BMI OF 60.0-69.9, ADULT (HCC): Primary | ICD-10-CM

## 2021-04-01 DIAGNOSIS — E66.01 CLASS 3 SEVERE OBESITY WITHOUT SERIOUS COMORBIDITY WITH BODY MASS INDEX (BMI) OF 60.0 TO 69.9 IN ADULT, UNSPECIFIED OBESITY TYPE (HCC): ICD-10-CM

## 2021-04-01 DIAGNOSIS — G47.33 OSA (OBSTRUCTIVE SLEEP APNEA): ICD-10-CM

## 2021-04-01 PROCEDURE — 3008F BODY MASS INDEX DOCD: CPT | Performed by: STUDENT IN AN ORGANIZED HEALTH CARE EDUCATION/TRAINING PROGRAM

## 2021-04-01 PROCEDURE — 99214 OFFICE O/P EST MOD 30 MIN: CPT | Performed by: PHYSICIAN ASSISTANT

## 2021-04-01 PROCEDURE — 1036F TOBACCO NON-USER: CPT | Performed by: PHYSICIAN ASSISTANT

## 2021-04-01 RX ORDER — OMEGA-3 FATTY ACIDS/FISH OIL 300-1000MG
CAPSULE ORAL
COMMUNITY

## 2021-04-01 NOTE — ASSESSMENT & PLAN NOTE
Taking duloxetine  -continue management with prescribing provider  -consider wellbutrin if switching medications
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
## 2021-04-01 NOTE — ASSESSMENT & PLAN NOTE
-Patient is NOT interested in surgery   -Initial weight loss goal of 5-10% weight loss for improved health    Initial: 363 lbs   Current: 370 lbs   Change: +7 lbs   Goal: 150-180 lbs     Start healthycore

## 2021-04-06 ENCOUNTER — OFFICE VISIT (OUTPATIENT)
Dept: BARIATRICS | Facility: CLINIC | Age: 56
End: 2021-04-06

## 2021-04-06 DIAGNOSIS — R63.5 ABNORMAL WEIGHT GAIN: ICD-10-CM

## 2021-04-06 PROCEDURE — RECHECK

## 2021-04-06 PROCEDURE — WMPRO12

## 2021-04-06 NOTE — PROGRESS NOTES
Weight Management Medical Nutrition Assessment  Steve Brooks is here today as a new start in the Healthy [facility] program   Today she weighs 371 8  lbs and has a goal weight of 180 lbs  She has limited mobility and uses 2 canes to walk  She reports that she also has depression, but is being followed by her PCP  She admits that she "binge eats, hides food, sneaks food" etc   Her  was also present for the appt as well "for support," per patient's request   Unfortunately, there was some arguing during the appointment and it was hard to keep them both focussed on the information  Dicussed the behavioral health visit that is part of Healthy [facility], and she has an apportionment with [facility] in 2 weeks  Recommend that she start food logging everything she eats/drinks  Provided her with information on my fitness pal juany, and also provide food logs that she could use  Discussed portion sizes and provided her with a portion plate  More education will be required         Patient seen by Medical Provider in past 6 months:  yes  Requested to schedule appointment with Medical Provider: No      Anthropometric Measurements  Start Weight (#): 371 6 #  (heaviest 417 lbs)  Current Weight (#): 371 6#  TBW % Change from start weight:0%  Ideal Body Weight (#):115#  Goal Weight (#):150 - 180#    Weight Loss History  Previous weight loss attempts: Self Created Diets (Portion Control, Healthy Food Choices, etc )    Food and Nutrition Related History  Wake up: "whenever"   Bed Time:midnight    Food Recall  Breakfast: skips or has cream of wheat or several pancakes     Snack: not usually or a banana if needed  Lunch:skips  Snack:none  Dinner: cheese burger with fries, or chicken with sauce,mashed potatoes, beans  Snack: grazes ("eats everything that is there")      Beverages: coffee/tea, water, 2% milk (2 -3 cups per day)  Volume of beverage intake: 48     Weekends: Same  Cravings: meat/carbs  Trouble area of day: night    Frequency of Eating out: irregularly  Food restrictions:none  Cooking: self   Food Shopping: self    Physical Activity Intake  Activity:none  Frequency:never  Physical limitations/barriers to exercise:     Estimated Needs  Energy    Bear Katja Energy Needs: BMR : 1367 1-2# loss weekly sedentary: 1695 - 2195            1-2# loss weekly lightly active:  2088 - 2588  Maintenance calories for sedentary activity level: 2695  Protein: 63 - 78 (1 2-1 5g/kg IBW)  Fluid:   62 (35mL/kg IBW)    Nutrition Diagnosis  Yes;     Overweight/obesity  related to Excess energy intake as evidenced by  BMI more than normative standard for age and sex (obesity-grade III 36+)       Nutrition Intervention    Nutrition Prescription  Calories:  1500  Protein: 61 - 78  Fluid: 62    Meal Plan (Jet/Pro/Carb)  Breakfast: 300/10/30  Snack:  Lunch: 400/20/30  Snack: 150/10/15  Dinner: 400 - 500/28/30  Snack: 150/10/15    Nutrition Education:    Healthy Core Manual  Calorie controlled menu  Lean protein food choices  Healthy snack options  Food journaling tips      Nutrition Counseling:  Strategies: meal planning, portion sizes, healthy snack choices, hydration, fiber intake, protein intake, exercise, food journal      Monitoring and Evaluation:  Evaluation criteria:  Energy Intake  Meet protein needs  Maintain adequate hydration  Monitor weekly weight  Meal planning/preparation  Food journal   Decreased portions at mealtimes and snacks  Physical activity     Barriers to learning:none  Readiness to change: Contemplation:  (Acknowledging that there is a problem but not yet ready or sure of wanting to make a change)  Comprehension: fair  Expected Compliance: fair

## 2021-04-08 VITALS — BODY MASS INDEX: 51.91 KG/M2 | WEIGHT: 293 LBS | HEIGHT: 63 IN

## 2021-04-13 ENCOUNTER — HOSPITAL ENCOUNTER (OUTPATIENT)
Dept: RADIOLOGY | Facility: MEDICAL CENTER | Age: 56
Discharge: HOME | End: 2021-04-13
Payer: COMMERCIAL

## 2021-04-13 DIAGNOSIS — R10.2 RIGHT ADNEXAL TENDERNESS: ICD-10-CM

## 2021-04-13 PROCEDURE — 76856 US EXAM PELVIC COMPLETE: CPT

## 2021-04-13 PROCEDURE — 76830 TRANSVAGINAL US NON-OB: CPT

## 2021-04-15 ENCOUNTER — TELEPHONE (OUTPATIENT)
Dept: BARIATRICS | Facility: CLINIC | Age: 56
End: 2021-04-15

## 2021-04-15 NOTE — TELEPHONE ENCOUNTER
I called Bela Russell regarding HC class - last night there were some technical difficulties and I was unable to log into the class  I wanted to follow-up with her to let her know that I added a class on for her  I also asked if she had any questions or needed anything else

## 2021-04-19 ENCOUNTER — OFFICE VISIT (OUTPATIENT)
Dept: BARIATRICS | Facility: CLINIC | Age: 56
End: 2021-04-19

## 2021-04-19 DIAGNOSIS — R63.5 ABNORMAL WEIGHT GAIN: Primary | ICD-10-CM

## 2021-04-19 PROCEDURE — RECHECK

## 2021-04-19 NOTE — PROGRESS NOTES
Weight Management Medical Nutrition Assessment  Jorge is here today for her 2 week follow-up in the [facility] program   Today she weighs 368 6 giving her a loss of 3 lbs in the last 2 weeks  She has started to manually food log but admits that she was "not always honest" and left some things out  Reiterated the importance of accurate food logging  She continues to struggle with skipping meals and then over eating later in the day  She continues to need additional education       Patient seen by Medical Provider in past 6 months:  yes  Requested to schedule appointment with Medical Provider: No        Anthropometric Measurements  Start Weight (#): 371 6 #  (heaviest 417 lbs)  Current Weight (#): 368 6#  TBW % Change from start weight:1%  Ideal Body Weight (#):115#  Goal Weight (#):150 - 180#     Weight Loss History  Previous weight loss attempts: Self Created Diets (Portion Control, Healthy Food Choices, etc )     Food and Nutrition Related History  Wake up: "whenever"   Bed Time:midnight     Food Recall  Breakfast: skips or has cream of wheat or several pancakes                         Snack: not usually or a banana if needed  Lunch:salad   Snack:none  Dinner: cheese burger with fries, or chicken with sauce,mashed potatoes, beans  Snack: carrots with ranch dresshing     Beverages: coffee/tea, water, 2% milk (2 -3 cups per day)  Volume of beverage intake: 48      Weekends: Same  Cravings: meat/carbs  Trouble area of day: night     Frequency of Eating out: irregularly  Food restrictions:none  Cooking: self   Food Shopping: self     Physical Activity Intake  Activity:none  Frequency:never  Physical limitations/barriers to exercise:      Estimated Needs  Energy     Bear Katja Energy Needs:  BMR : 3666 1-2# loss weekly sedentary: 1677 - 2177            1-2# loss weekly lightly active:  2068 - 2568  Maintenance calories for sedentary activity level: 2677  Protein: 63 - 78 (1 2-1 5g/kg IBW)  Fluid:   62 (35mL/kg IBW)     Nutrition Diagnosis  Yes;     Overweight/obesity  related to Excess energy intake as evidenced by  BMI more than normative standard for age and sex (obesity-grade III 36+)     Nutrition Intervention     Nutrition Prescription  Calories:  1500  Protein: 63 - 78  Fluid: 62     Meal Plan (Jet/Pro/Carb)  Breakfast: 300/10/30  Snack:  Lunch: 400/20/30  Snack: 150/10/15  Dinner: 400 - 500/28/30  Snack: 150/10/15     Nutrition Education:    Healthy Core Manual  Calorie controlled menu  Lean protein food choices  Healthy snack options  Food journaling tips        Nutrition Counseling:  Strategies: meal planning, portion sizes, healthy snack choices, hydration, fiber intake, protein intake, exercise, food journal        Monitoring and Evaluation:  Evaluation criteria:  Energy Intake  Meet protein needs  Maintain adequate hydration  Monitor weekly weight  Meal planning/preparation  Food journal   Decreased portions at mealtimes and snacks  Physical activity      Barriers to learning:none  Readiness to change: Contemplation:  (Acknowledging that there is a problem but not yet ready or sure of wanting to make a change)  Comprehension: fair  Expected Compliance: fair

## 2021-04-20 ENCOUNTER — HOSPITAL ENCOUNTER (OUTPATIENT)
Dept: MAMMOGRAPHY | Facility: CLINIC | Age: 56
Discharge: HOME | End: 2021-04-20
Payer: COMMERCIAL

## 2021-04-20 VITALS — HEIGHT: 63 IN | WEIGHT: 293 LBS | BODY MASS INDEX: 51.91 KG/M2

## 2021-04-20 DIAGNOSIS — N64.4 BREAST TENDERNESS IN FEMALE: ICD-10-CM

## 2021-04-20 PROCEDURE — 77066 DX MAMMO INCL CAD BI: CPT

## 2021-04-20 PROCEDURE — 76642 ULTRASOUND BREAST LIMITED: CPT

## 2021-04-20 PROCEDURE — G0279 TOMOSYNTHESIS, MAMMO: HCPCS

## 2021-04-21 ENCOUNTER — CLINICAL SUPPORT (OUTPATIENT)
Dept: BARIATRICS | Facility: CLINIC | Age: 56
End: 2021-04-21

## 2021-04-21 DIAGNOSIS — R63.5 ABNORMAL WEIGHT GAIN: Primary | ICD-10-CM

## 2021-04-21 PROCEDURE — RECHECK

## 2021-04-22 VITALS — WEIGHT: 293 LBS | BODY MASS INDEX: 51.91 KG/M2 | HEIGHT: 63 IN

## 2021-04-28 ENCOUNTER — CLINICAL SUPPORT (OUTPATIENT)
Dept: BARIATRICS | Facility: CLINIC | Age: 56
End: 2021-04-28

## 2021-04-28 DIAGNOSIS — R63.5 ABNORMAL WEIGHT GAIN: Primary | ICD-10-CM

## 2021-04-28 PROCEDURE — RECHECK

## 2021-04-28 PROCEDURE — 3008F BODY MASS INDEX DOCD: CPT | Performed by: PHYSICIAN ASSISTANT

## 2021-04-29 VITALS — BODY MASS INDEX: 51.91 KG/M2 | HEIGHT: 63 IN | WEIGHT: 293 LBS
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
## 2021-05-05 ENCOUNTER — OFFICE VISIT (OUTPATIENT)
Dept: BARIATRICS | Facility: CLINIC | Age: 56
End: 2021-05-05

## 2021-05-05 VITALS — WEIGHT: 293 LBS | HEIGHT: 63 IN | BODY MASS INDEX: 51.91 KG/M2

## 2021-05-05 DIAGNOSIS — F41.9 ANXIETY: Primary | ICD-10-CM

## 2021-05-05 PROCEDURE — RECHECK

## 2021-05-12 ENCOUNTER — CLINICAL SUPPORT (OUTPATIENT)
Dept: BARIATRICS | Facility: CLINIC | Age: 56
End: 2021-05-12

## 2021-05-12 DIAGNOSIS — R63.5 ABNORMAL WEIGHT GAIN: Primary | ICD-10-CM

## 2021-05-12 DIAGNOSIS — E03.8 OTHER SPECIFIED HYPOTHYROIDISM: Primary | ICD-10-CM

## 2021-05-12 DIAGNOSIS — E01.0 THYROMEGALY: ICD-10-CM

## 2021-05-12 PROCEDURE — RECHECK

## 2021-05-12 NOTE — TELEPHONE ENCOUNTER
She has an order for labfor TSH /T4 she should complete that prior to refilling of this medication she does have a follow-up appointment with me the lab prior to that appointment

## 2021-05-13 VITALS — BODY MASS INDEX: 64.37 KG/M2 | HEIGHT: 63 IN

## 2021-05-14 ENCOUNTER — APPOINTMENT (OUTPATIENT)
Dept: LAB | Facility: MEDICAL CENTER | Age: 56
End: 2021-05-14
Payer: COMMERCIAL

## 2021-05-14 DIAGNOSIS — Z11.59 NEED FOR HEPATITIS C SCREENING TEST: ICD-10-CM

## 2021-05-14 DIAGNOSIS — E03.8 OTHER SPECIFIED HYPOTHYROIDISM: ICD-10-CM

## 2021-05-14 DIAGNOSIS — Z11.4 SCREENING FOR HIV (HUMAN IMMUNODEFICIENCY VIRUS): ICD-10-CM

## 2021-05-14 LAB
HCV AB SER QL: NORMAL
TSH SERPL DL<=0.05 MIU/L-ACNC: 3.14 UIU/ML (ref 0.36–3.74)

## 2021-05-14 PROCEDURE — 86803 HEPATITIS C AB TEST: CPT

## 2021-05-14 PROCEDURE — 36415 COLL VENOUS BLD VENIPUNCTURE: CPT

## 2021-05-14 PROCEDURE — 87389 HIV-1 AG W/HIV-1&-2 AB AG IA: CPT

## 2021-05-14 PROCEDURE — 84443 ASSAY THYROID STIM HORMONE: CPT

## 2021-05-14 RX ORDER — LEVOTHYROXINE SODIUM 0.05 MG/1
50 TABLET ORAL
Qty: 90 TABLET | Refills: 1 | Status: SHIPPED | OUTPATIENT
Start: 2021-05-14 | End: 2021-09-15 | Stop reason: SDUPTHER

## 2021-05-16 LAB — HIV 1+2 AB+HIV1 P24 AG SERPL QL IA: NORMAL

## 2021-05-19 ENCOUNTER — CLINICAL SUPPORT (OUTPATIENT)
Dept: BARIATRICS | Facility: CLINIC | Age: 56
End: 2021-05-19

## 2021-05-19 DIAGNOSIS — R63.5 ABNORMAL WEIGHT GAIN: Primary | ICD-10-CM

## 2021-05-19 PROCEDURE — RECHECK

## 2021-05-21 VITALS — HEIGHT: 63 IN | BODY MASS INDEX: 64.37 KG/M2

## 2021-05-26 ENCOUNTER — CLINICAL SUPPORT (OUTPATIENT)
Dept: BARIATRICS | Facility: CLINIC | Age: 56
End: 2021-05-26

## 2021-05-26 ENCOUNTER — OFFICE VISIT (OUTPATIENT)
Dept: FAMILY MEDICINE CLINIC | Facility: CLINIC | Age: 56
End: 2021-05-26
Payer: COMMERCIAL

## 2021-05-26 VITALS
DIASTOLIC BLOOD PRESSURE: 68 MMHG | OXYGEN SATURATION: 98 % | TEMPERATURE: 97.9 F | HEART RATE: 77 BPM | HEIGHT: 63 IN | WEIGHT: 293 LBS | BODY MASS INDEX: 51.91 KG/M2 | SYSTOLIC BLOOD PRESSURE: 122 MMHG

## 2021-05-26 DIAGNOSIS — R63.5 ABNORMAL WEIGHT GAIN: Primary | ICD-10-CM

## 2021-05-26 DIAGNOSIS — E03.8 OTHER SPECIFIED HYPOTHYROIDISM: Primary | ICD-10-CM

## 2021-05-26 DIAGNOSIS — J41.0 SIMPLE CHRONIC BRONCHITIS (HCC): ICD-10-CM

## 2021-05-26 DIAGNOSIS — R53.83 FATIGUE, UNSPECIFIED TYPE: ICD-10-CM

## 2021-05-26 DIAGNOSIS — F32.0 CURRENT MILD EPISODE OF MAJOR DEPRESSIVE DISORDER, UNSPECIFIED WHETHER RECURRENT (HCC): ICD-10-CM

## 2021-05-26 PROCEDURE — 1036F TOBACCO NON-USER: CPT | Performed by: NURSE PRACTITIONER

## 2021-05-26 PROCEDURE — 99214 OFFICE O/P EST MOD 30 MIN: CPT | Performed by: NURSE PRACTITIONER

## 2021-05-26 PROCEDURE — RECHECK

## 2021-05-26 RX ORDER — DULOXETINE 40 MG/1
40 CAPSULE, DELAYED RELEASE ORAL DAILY
Qty: 90 CAPSULE | Refills: 1 | Status: SHIPPED | OUTPATIENT
Start: 2021-05-26 | End: 2021-07-26 | Stop reason: SDUPTHER

## 2021-05-26 NOTE — PROGRESS NOTES
Assessment/Plan:  Physical assessment was unremarkable patient states that she is doing quite well with her visits to weight management  Her anxiety depression thyroid disease and asthma COPD are all stable well controlled  Refilled her duloxetine no changes in dosage routine labs were placed patient is to complete those prior to a next four-month follow-up  Questions were answered patient verbal verbalized agreement and understanding  Return to office as indicated  Dosing all possible side effects of the prescribed medications or medications that had been prescribed in the past were reviewed and all questions were answered  Patient verbalized agreement and understanding of the plan of care as outlined during the office visit today return to office as indicated or sooner if a problem arises  Problem List Items Addressed This Visit        Endocrine    Hypothyroid - Primary    Relevant Medications    Diclofenac Sodium (VOLTAREN) 1 %    Other Relevant Orders    UA w Reflex to Microscopic w Reflex to Culture    CBC and differential    Comprehensive metabolic panel    Lipid panel    TSH, 3rd generation       Respiratory    COPD (chronic obstructive pulmonary disease) (HCC)       Other    Depression    Relevant Medications    DULoxetine HCl 40 MG CPEP      Other Visit Diagnoses     Fatigue, unspecified type        Relevant Medications    Diclofenac Sodium (VOLTAREN) 1 %    Other Relevant Orders    UA w Reflex to Microscopic w Reflex to Culture    CBC and differential    Comprehensive metabolic panel    Lipid panel    TSH, 3rd generation            Subjective:      Patient ID: Joon Bhagat is a 63 y o  female  Patient is here for routine follow-up  To review most recent labs  To also discuss current state of health and any new problems that they may be experiencing  Patient states that medications taken as prescribed and very well tolerated no new complaints at this time          The following portions of the patient's history were reviewed and updated as appropriate: past family history, past social history and past surgical history  Review of Systems   Constitutional: Negative for appetite change and fever  HENT: Negative for sinus pressure and sore throat  Eyes: Negative for pain  Respiratory: Negative for shortness of breath  Cardiovascular: Negative for chest pain  Gastrointestinal: Negative for abdominal pain  Genitourinary: Negative for dysuria  Musculoskeletal: Negative for arthralgias and myalgias  Skin: Negative for color change  Neurological: Negative for light-headedness  Psychiatric/Behavioral: Negative for behavioral problems  Objective:      /68 (BP Location: Left arm, Patient Position: Sitting, Cuff Size: Large)   Pulse 77   Temp 97 9 °F (36 6 °C) (Temporal)   Ht 5' 3" (1 6 m)   Wt (!) 162 kg (358 lb)   SpO2 98% Comment: room air at rest  BMI 63 42 kg/m²          Physical Exam  Vitals signs and nursing note reviewed  Constitutional:       General: She is not in acute distress  Appearance: She is well-developed  She is obese  She is not diaphoretic  HENT:      Head: Normocephalic and atraumatic  Eyes:      Pupils: Pupils are equal, round, and reactive to light  Neck:      Musculoskeletal: Normal range of motion and neck supple  Cardiovascular:      Rate and Rhythm: Normal rate and regular rhythm  Heart sounds: Normal heart sounds  Pulmonary:      Effort: Pulmonary effort is normal       Breath sounds: Normal breath sounds  Abdominal:      Palpations: Abdomen is soft  Musculoskeletal: Normal range of motion  Skin:     General: Skin is dry  Neurological:      Mental Status: She is alert and oriented to person, place, and time  Psychiatric:         Behavior: Behavior normal          Thought Content:  Thought content normal

## 2021-05-27 VITALS — WEIGHT: 293 LBS | BODY MASS INDEX: 51.91 KG/M2 | HEIGHT: 63 IN

## 2021-06-02 ENCOUNTER — CLINICAL SUPPORT (OUTPATIENT)
Dept: BARIATRICS | Facility: CLINIC | Age: 56
End: 2021-06-02

## 2021-06-02 ENCOUNTER — OFFICE VISIT (OUTPATIENT)
Dept: BARIATRICS | Facility: CLINIC | Age: 56
End: 2021-06-02

## 2021-06-02 VITALS — HEIGHT: 63 IN | WEIGHT: 293 LBS | BODY MASS INDEX: 51.91 KG/M2

## 2021-06-02 DIAGNOSIS — F41.9 ANXIETY: Primary | ICD-10-CM

## 2021-06-02 DIAGNOSIS — R63.5 ABNORMAL WEIGHT GAIN: ICD-10-CM

## 2021-06-02 DIAGNOSIS — R63.5 ABNORMAL WEIGHT GAIN: Primary | ICD-10-CM

## 2021-06-02 PROCEDURE — RECHECK

## 2021-06-02 NOTE — PROGRESS NOTES
MWM follow up  Lost almost 10 lbs since the beginning of [facility]  Reports a lot of stress due to husbands mood swings-had brain surgery in 2016  Patient goes to outpatient therapy weekly for the past 7 years   started coming to counseling with her 2 years ago, which she reports is good, however he always is on the video for counseling, so never gets to have individual counseling anymore  2 years ago  got inheritance and her social security was taken away  Feels "trapped" most of the time  Drinking 2-4 cups of coffee, 1-3 cups of 4c sugar free tea, and 24-48 oz of water  Goals discussed: 1  Decrease caffeine 2  Increase water 3  Continue to get into a routine for sleep 4   Continue to be mindful not not to skip meals and being mindful of protein HC LCSW

## 2021-06-03 VITALS — WEIGHT: 293 LBS | HEIGHT: 63 IN | BODY MASS INDEX: 51.91 KG/M2

## 2021-06-09 ENCOUNTER — CLINICAL SUPPORT (OUTPATIENT)
Dept: BARIATRICS | Facility: CLINIC | Age: 56
End: 2021-06-09

## 2021-06-09 DIAGNOSIS — R63.5 ABNORMAL WEIGHT GAIN: Primary | ICD-10-CM

## 2021-06-09 PROCEDURE — RECHECK

## 2021-06-10 VITALS — HEIGHT: 63 IN | BODY MASS INDEX: 64.05 KG/M2

## 2021-06-16 ENCOUNTER — CLINICAL SUPPORT (OUTPATIENT)
Dept: BARIATRICS | Facility: CLINIC | Age: 56
End: 2021-06-16

## 2021-06-16 ENCOUNTER — OFFICE VISIT (OUTPATIENT)
Dept: BARIATRICS | Facility: CLINIC | Age: 56
End: 2021-06-16

## 2021-06-16 VITALS — HEIGHT: 63 IN | BODY MASS INDEX: 51.91 KG/M2 | WEIGHT: 293 LBS

## 2021-06-16 DIAGNOSIS — R63.5 ABNORMAL WEIGHT GAIN: Primary | ICD-10-CM

## 2021-06-16 PROCEDURE — RECHECK

## 2021-06-16 PROCEDURE — 3008F BODY MASS INDEX DOCD: CPT | Performed by: NURSE PRACTITIONER

## 2021-06-16 NOTE — PROGRESS NOTES
Weight Management Medical Nutrition Assessment  Nola Lesch is here today for her 2 month follow-up in the [facility] program  Leola Scherer she weighs 356 0 lbs  giving her a loss of 12 6 lbs in the last 5 weeks  She continues to manually food log and is starting to weigh and measure her food  She has also stopped skipping meals and will have "something small" instead of not having anything  She admits that her  has started being less supportive now that she is losing weight and they have a counseling appt this week  Patient seen by Medical Provider in past 6 months:  yes  Requested to schedule appointment with Medical Provider: No        Anthropometric Measurements  Start Weight (#): 371 6 #  (heaviest 417 lbs)  Current Weight (#): 356#  TBW % Change from start weight:4%  Ideal Body Weight (#):115#  Goal Weight (#):150 - 180#     Weight Loss History  Previous weight loss attempts: Self Created Diets (Portion Control, Healthy Food Choices, etc )     Food and Nutrition Related History  Wake up: "whenever"   Bed Time:midnight     Food Recall  Breakfast: 2 eggs                         Snack: not usually or a banana if needed  Lunch:salad   Snack:none  Dinner: chicken and vegetables   Snack: carrots with ranch dresshing     Beverages: coffee/tea, water, 2% milk (2 -3 cups per day)  Volume of beverage intake: 48      Weekends: Same  Cravings: meat/carbs  Trouble area of day: night     Frequency of Eating out: irregularly  Food restrictions:none  Cooking: self   Food Shopping: self     Physical Activity Intake  Activity:none  Frequency:never  Physical limitations/barriers to exercise:      Estimated Needs  Energy     [employer] Energy Needs:  BMR : 2231 1-2# loss weekly sedentary: 1677 - 2177            1-2# loss weekly lightly active:  5656 - 8083  Maintenance calories for sedentary activity level: 4947  Protein: 63 - 78 (1 2-1 5g/kg IBW)  Fluid:   62 (35mL/kg IBW)     Nutrition Diagnosis  Yes;    Overweight/obesity  related to Excess energy intake as evidenced by  BMI more than normative standard for age and sex (obesity-grade III 36+)     Nutrition Intervention     Nutrition Prescription  Calories:  1500  Protein: 63 - 66  Fluid: 62     Meal Plan (Jet/Pro/Carb)  Breakfast: 300/10/30  Snack:  Lunch: 400/20/30  Snack: 150/10/15  Dinner: 400 - 500/28/30  Snack: 150/10/15     Nutrition Education:    Healthy Core Manual  Calorie controlled menu  Lean protein food choices  Healthy snack options  Food journaling tips        Nutrition Counseling:  Strategies: meal planning, portion sizes, healthy snack choices, hydration, fiber intake, protein intake, exercise, food journal        Monitoring and Evaluation:  Evaluation criteria:  Energy Intake  Meet protein needs  Maintain adequate hydration  Monitor weekly weight  Meal planning/preparation  Food journal   Decreased portions at mealtimes and snacks  Physical activity      Barriers to learning:none  Readiness to change: Contemplation:  (Acknowledging that there is a problem but not yet ready or sure of wanting to make a change)  Comprehension: fair  Expected Compliance: fair

## 2021-06-17 VITALS — BODY MASS INDEX: 51.91 KG/M2 | WEIGHT: 293 LBS | HEIGHT: 63 IN

## 2021-06-30 ENCOUNTER — CLINICAL SUPPORT (OUTPATIENT)
Dept: BARIATRICS | Facility: CLINIC | Age: 56
End: 2021-06-30

## 2021-06-30 DIAGNOSIS — R63.5 ABNORMAL WEIGHT GAIN: Primary | ICD-10-CM

## 2021-06-30 PROCEDURE — RECHECK

## 2021-07-01 VITALS — BODY MASS INDEX: 63.06 KG/M2 | HEIGHT: 63 IN

## 2021-07-06 NOTE — PROGRESS NOTES
MW support  Patient presented in an upbeat mood  Has been more consistent with eating 3 meals per day, occasionally having 2 meals   was started on medication has been helping a lot with them fighting  Therapist was out of town and had counseling session with , that also went really well   had been communicating better and being more considerate  Drinking 2-4 cups of coffee, 16 oz 4c sugar free tea, and 48-64 oz of water daily  Patient looking forward to visiting her family in the beginning of August  Continued to discuss avoiding the restrictive/overeating cycle  Patient reports that it is still too soon for her to introduce foods that she feels trigger her to overeat   HC LCSW

## 2021-07-07 ENCOUNTER — OFFICE VISIT (OUTPATIENT)
Dept: BARIATRICS | Facility: CLINIC | Age: 56
End: 2021-07-07

## 2021-07-07 VITALS — WEIGHT: 293 LBS | BODY MASS INDEX: 51.91 KG/M2 | HEIGHT: 63 IN

## 2021-07-07 DIAGNOSIS — F41.9 ANXIETY: Primary | ICD-10-CM

## 2021-07-07 DIAGNOSIS — R63.5 ABNORMAL WEIGHT GAIN: ICD-10-CM

## 2021-07-07 PROCEDURE — 3008F BODY MASS INDEX DOCD: CPT | Performed by: NURSE PRACTITIONER

## 2021-07-07 PROCEDURE — RECHECK

## 2021-07-14 ENCOUNTER — CLINICAL SUPPORT (OUTPATIENT)
Dept: BARIATRICS | Facility: CLINIC | Age: 56
End: 2021-07-14

## 2021-07-14 DIAGNOSIS — R63.5 ABNORMAL WEIGHT GAIN: Primary | ICD-10-CM

## 2021-07-14 PROCEDURE — RECHECK

## 2021-07-15 VITALS — HEIGHT: 63 IN | BODY MASS INDEX: 62.58 KG/M2

## 2021-07-26 DIAGNOSIS — F32.0 CURRENT MILD EPISODE OF MAJOR DEPRESSIVE DISORDER, UNSPECIFIED WHETHER RECURRENT (HCC): ICD-10-CM

## 2021-07-26 RX ORDER — DULOXETINE 40 MG/1
40 CAPSULE, DELAYED RELEASE ORAL DAILY
Qty: 90 CAPSULE | Refills: 1 | Status: SHIPPED | OUTPATIENT
Start: 2021-07-26

## 2021-07-27 ENCOUNTER — OFFICE VISIT (OUTPATIENT)
Dept: BARIATRICS | Facility: CLINIC | Age: 56
End: 2021-07-27

## 2021-07-27 VITALS — BODY MASS INDEX: 51.91 KG/M2 | WEIGHT: 293 LBS | HEIGHT: 63 IN

## 2021-07-27 DIAGNOSIS — R63.5 ABNORMAL WEIGHT GAIN: Primary | ICD-10-CM

## 2021-07-27 PROCEDURE — RECHECK

## 2021-07-27 NOTE — PROGRESS NOTES
Weight Management Medical Nutrition Assessment  Melody Ulloa is here today for her 2 month follow-up in the [facility] program  Sergiorima Juanjose she weighs 356 0 lbs  giving her a loss of 12 6 lbs in the last 5 weeks   She continues to manually food log and is starting to weigh and measure her food  She has also stopped skipping meals and will have "something small" instead of not having anything  She admits that her  has started being less supportive now that she is losing weight and they have a counseling appt this week       Patient seen by Medical Provider in past 6 months:  yes  Requested to schedule appointment with Medical Provider: No        Anthropometric Measurements  Start Weight (#): 371 6 #  (heaviest 417 lbs)  Current Weight (#): 356#  TBW % Change from start weight:4%  Ideal Body Weight (#):115#  Goal Weight (#):150 - 180#     Weight Loss History  Previous weight loss attempts: Self Created Diets (Portion Control, Healthy Food Choices, etc )     Food and Nutrition Related History  Wake up: "whenever"   Bed Time:midnight     Food Recall  Breakfast: 2 eggs                         Snack: not usually or a banana if needed  Lunch:salad   Snack:none  Dinner: chicken and vegetables   Snack: carrots with ranch dresshing     Beverages: coffee/tea, water, 2% milk (2 -3 cups per day)  Volume of beverage intake: 48      Weekends: Same  Cravings: meat/carbs  Trouble area of day: night     Frequency of Eating out: irregularly  Food restrictions:none  Cooking: self   Food Shopping: self     Physical Activity Intake  Activity:none  Frequency:never  Physical limitations/barriers to exercise:      Estimated Needs  Energy     [employer] Energy Needs:  BMR : 2231 1-2# loss weekly sedentary: 7816 - 8098            9-1# loss weekly lightly active:  2276 - 0683  Maintenance calories for sedentary activity level: 2677  Protein: 63 - 78 (1 2-1 5g/kg IBW)  Fluid:   62 (35mL/kg IBW)     Nutrition Diagnosis  Yes;    Overweight/obesity  related to Excess energy intake as evidenced by  BMI more than normative standard for age and sex (obesity-grade III 36+)     Nutrition Intervention     Nutrition Prescription  Calories:  1500  Protein: 63 - 66  Fluid: 62     Meal Plan (Jet/Pro/Carb)  Breakfast: 300/10/30  Snack:150/10/15  Dinner: 400 - 500/28/30  Snack: 150/10/15     Nutrition Education:    Healthy [facility] Manual  Calorie controlled menu  Lean protein food choices  Healthy snack options  Food journaling tips

## 2021-08-13 NOTE — PROGRESS NOTES
CHANELM support  Was visiting family in [place] for the past 2 weeks  Has 3 family members that have failing health and are not doing well  Was able to stay on track while away  Asked her mom to put away any "junk " Was able to eat mindfully without overly restricting  grandson had ice cream sandwiches- used to eat 3 at a time, was able to eat half and feel satisfied  Praised patient for her success and all the changes that she has made  Feels that she has more control  Did not sleep while she was away  Still having trouble accepting compliments- encouraged patient to practice self love and self kindness and challenging negative thoughts when she isn't believing what others are complimenting her about  Encouraged patient to continue to focus on the positive changes that she is making rather then the number on the scale  Encouraged patient to continue to eat regular balanced meals and avoiding over restricting  Gave patient HW to look in the mirror and find something to compliment herself about   HC LCSW

## 2021-08-18 ENCOUNTER — OFFICE VISIT (OUTPATIENT)
Dept: BARIATRICS | Facility: CLINIC | Age: 56
End: 2021-08-18

## 2021-08-18 DIAGNOSIS — F41.9 ANXIETY: Primary | ICD-10-CM

## 2021-08-18 PROCEDURE — RECHECK

## 2021-09-15 ENCOUNTER — OFFICE VISIT (OUTPATIENT)
Dept: FAMILY MEDICINE CLINIC | Facility: CLINIC | Age: 56
End: 2021-09-15
Payer: COMMERCIAL

## 2021-09-15 VITALS
HEART RATE: 72 BPM | DIASTOLIC BLOOD PRESSURE: 62 MMHG | RESPIRATION RATE: 20 BRPM | WEIGHT: 293 LBS | TEMPERATURE: 97.3 F | HEIGHT: 63 IN | OXYGEN SATURATION: 97 % | SYSTOLIC BLOOD PRESSURE: 116 MMHG | BODY MASS INDEX: 51.91 KG/M2

## 2021-09-15 DIAGNOSIS — J41.0 SIMPLE CHRONIC BRONCHITIS (HCC): ICD-10-CM

## 2021-09-15 DIAGNOSIS — F32.0 CURRENT MILD EPISODE OF MAJOR DEPRESSIVE DISORDER, UNSPECIFIED WHETHER RECURRENT (HCC): Primary | ICD-10-CM

## 2021-09-15 DIAGNOSIS — E01.0 THYROMEGALY: ICD-10-CM

## 2021-09-15 DIAGNOSIS — Z12.11 SCREENING FOR MALIGNANT NEOPLASM OF COLON: ICD-10-CM

## 2021-09-15 DIAGNOSIS — E66.01 MORBID OBESITY WITH BMI OF 60.0-69.9, ADULT (HCC): ICD-10-CM

## 2021-09-15 PROCEDURE — 99214 OFFICE O/P EST MOD 30 MIN: CPT | Performed by: NURSE PRACTITIONER

## 2021-09-15 RX ORDER — LEVOTHYROXINE SODIUM 0.05 MG/1
50 TABLET ORAL
Qty: 90 TABLET | Refills: 1 | Status: SHIPPED | OUTPATIENT
Start: 2021-09-15 | End: 2022-06-22 | Stop reason: SDUPTHER

## 2021-09-15 NOTE — PROGRESS NOTES
Assessment/Plan:    Hypothyroidism  Patient does refuse did not get her a routine labs completed advised do not want liver without medications or medications current dosage 50 mcgs was refilled  Will contact her with time and results of her labs when she completes them  Depression  Patient states that she does not need a refill on her 40 mg duloxetine she takes as prescribed as well tolerated  Her depression is stable  Morbid obesity  Patient does see bariatrics for morbid obesity she has dropped an additional 4 lb since her last office visit with us  At patient states that she is going to stop seen bariatrics advised against it however the decision is hers to make  A complete routine labs will contact her with results when available  Dosing all possible side effects of the prescribed medications or medications that had been prescribed in the past were reviewed and all questions were answered  Patient verbalized agreement and understanding of the plan of care as outlined during the office visit today return to office as indicated or sooner if a problem arises  Problem List Items Addressed This Visit        Respiratory    COPD (chronic obstructive pulmonary disease) ([facility] [address] )       Other    Morbid obesity with BMI of 60 0-69 9, adult ([facility] )    Depression - Primary      Other Visit Diagnoses     Thyromegaly        Relevant Medications    levothyroxine 50 mcg tablet    Screening for malignant neoplasm of colon        Relevant Orders    Cologuard            Subjective:      Patient ID: Ludy Hager is a 63 y o  female  Patient is here for routine follow-up  To review most recent labs  To also discuss current state of health and any new problems that they may be experiencing  Patient states that medications taken as prescribed and very well tolerated no new complaints at this time          The following portions of the patient's history were reviewed and updated as appropriate: allergies, current medications, past family history, past medical history, past social history, past surgical history and problem list     Review of Systems   Constitutional: Negative for appetite change and fever  HENT: Negative for sinus pressure and sore throat  Eyes: Negative for pain  Respiratory: Negative for shortness of breath  Cardiovascular: Negative for chest pain  Gastrointestinal: Negative for abdominal pain  Genitourinary: Negative for dysuria  Musculoskeletal: Negative for arthralgias and myalgias  Skin: Negative for color change  Neurological: Negative for light-headedness  Psychiatric/Behavioral: Negative for behavioral problems           Objective:      Resp 20   Ht 5' 3" (1 6 m)   Wt (!) 154 kg (340 lb)   BMI 60 23 kg/m²          Physical Exam

## 2022-05-24 ENCOUNTER — APPOINTMENT (EMERGENCY)
Dept: CT IMAGING | Facility: HOSPITAL | Age: 57
End: 2022-05-24
Payer: COMMERCIAL

## 2022-05-24 ENCOUNTER — HOSPITAL ENCOUNTER (EMERGENCY)
Facility: HOSPITAL | Age: 57
Discharge: HOME | End: 2022-05-25
Attending: EMERGENCY MEDICINE | Admitting: EMERGENCY MEDICINE
Payer: COMMERCIAL

## 2022-05-24 ENCOUNTER — APPOINTMENT (EMERGENCY)
Dept: RADIOLOGY | Facility: HOSPITAL | Age: 57
End: 2022-05-24
Payer: COMMERCIAL

## 2022-05-24 ENCOUNTER — OFFICE VISIT (OUTPATIENT)
Dept: URGENT CARE | Facility: MEDICAL CENTER | Age: 57
End: 2022-05-24
Payer: COMMERCIAL

## 2022-05-24 ENCOUNTER — TELEPHONE (OUTPATIENT)
Dept: FAMILY MEDICINE CLINIC | Facility: CLINIC | Age: 57
End: 2022-05-24

## 2022-05-24 VITALS
HEIGHT: 63 IN | WEIGHT: 293 LBS | TEMPERATURE: 98.6 F | RESPIRATION RATE: 20 BRPM | HEART RATE: 106 BPM | BODY MASS INDEX: 51.91 KG/M2 | OXYGEN SATURATION: 96 %

## 2022-05-24 DIAGNOSIS — J04.0 LARYNGITIS: ICD-10-CM

## 2022-05-24 DIAGNOSIS — U07.1 COVID-19: Primary | ICD-10-CM

## 2022-05-24 DIAGNOSIS — J02.9 PHARYNGITIS, UNSPECIFIED ETIOLOGY: Primary | ICD-10-CM

## 2022-05-24 DIAGNOSIS — U07.1 COVID-19: ICD-10-CM

## 2022-05-24 LAB
ALBUMIN SERPL BCP-MCNC: 4 G/DL (ref 3.5–5)
ALP SERPL-CCNC: 72 U/L (ref 34–104)
ALT SERPL W P-5'-P-CCNC: 21 U/L (ref 7–52)
ANION GAP SERPL CALCULATED.3IONS-SCNC: 7 MMOL/L (ref 4–13)
AST SERPL W P-5'-P-CCNC: 22 U/L (ref 13–39)
BASOPHILS # BLD AUTO: 0.02 THOUSANDS/ΜL (ref 0–0.1)
BASOPHILS NFR BLD AUTO: 1 % (ref 0–1)
BILIRUB SERPL-MCNC: 0.4 MG/DL (ref 0.2–1)
BUN SERPL-MCNC: 16 MG/DL (ref 5–25)
CALCIUM SERPL-MCNC: 8.6 MG/DL (ref 8.4–10.2)
CARDIAC TROPONIN I PNL SERPL HS: 2 NG/L
CHLORIDE SERPL-SCNC: 104 MMOL/L (ref 96–108)
CO2 SERPL-SCNC: 28 MMOL/L (ref 21–32)
CREAT SERPL-MCNC: 0.92 MG/DL (ref 0.6–1.3)
EOSINOPHIL # BLD AUTO: 0.02 THOUSAND/ΜL (ref 0–0.61)
EOSINOPHIL NFR BLD AUTO: 1 % (ref 0–6)
ERYTHROCYTE [DISTWIDTH] IN BLOOD BY AUTOMATED COUNT: 13.2 % (ref 11.6–15.1)
FLUAV RNA RESP QL NAA+PROBE: NEGATIVE
FLUBV RNA RESP QL NAA+PROBE: NEGATIVE
GFR SERPL CREATININE-BSD FRML MDRD: 69 ML/MIN/1.73SQ M
GLUCOSE SERPL-MCNC: 92 MG/DL (ref 65–140)
HCT VFR BLD AUTO: 44.2 % (ref 34.8–46.1)
HGB BLD-MCNC: 14.6 G/DL (ref 11.5–15.4)
IMM GRANULOCYTES # BLD AUTO: 0.02 THOUSAND/UL (ref 0–0.2)
IMM GRANULOCYTES NFR BLD AUTO: 1 % (ref 0–2)
LACTATE SERPL-SCNC: 0.4 MMOL/L (ref 0.5–2)
LYMPHOCYTES # BLD AUTO: 1.63 THOUSANDS/ΜL (ref 0.6–4.47)
LYMPHOCYTES NFR BLD AUTO: 38 % (ref 14–44)
MCH RBC QN AUTO: 30.2 PG (ref 26.8–34.3)
MCHC RBC AUTO-ENTMCNC: 33 G/DL (ref 31.4–37.4)
MCV RBC AUTO: 91 FL (ref 82–98)
MONOCYTES # BLD AUTO: 0.57 THOUSAND/ΜL (ref 0.17–1.22)
MONOCYTES NFR BLD AUTO: 13 % (ref 4–12)
NEUTROPHILS # BLD AUTO: 2.06 THOUSANDS/ΜL (ref 1.85–7.62)
NEUTS SEG NFR BLD AUTO: 46 % (ref 43–75)
NRBC BLD AUTO-RTO: 0 /100 WBCS
PLATELET # BLD AUTO: 204 THOUSANDS/UL (ref 149–390)
PMV BLD AUTO: 10.6 FL (ref 8.9–12.7)
POTASSIUM SERPL-SCNC: 3.8 MMOL/L (ref 3.5–5.3)
PROT SERPL-MCNC: 7.4 G/DL (ref 6.4–8.4)
RBC # BLD AUTO: 4.84 MILLION/UL (ref 3.81–5.12)
RSV RNA RESP QL NAA+PROBE: NEGATIVE
S PYO DNA THROAT QL NAA+PROBE: NOT DETECTED
SARS-COV-2 RNA RESP QL NAA+PROBE: POSITIVE
SODIUM SERPL-SCNC: 139 MMOL/L (ref 135–147)
WBC # BLD AUTO: 4.32 THOUSAND/UL (ref 4.31–10.16)

## 2022-05-24 PROCEDURE — 85025 COMPLETE CBC W/AUTO DIFF WBC: CPT | Performed by: PHYSICIAN ASSISTANT

## 2022-05-24 PROCEDURE — 99213 OFFICE O/P EST LOW 20 MIN: CPT | Performed by: PHYSICIAN ASSISTANT

## 2022-05-24 PROCEDURE — 87651 STREP A DNA AMP PROBE: CPT | Performed by: PHYSICIAN ASSISTANT

## 2022-05-24 PROCEDURE — 99284 EMERGENCY DEPT VISIT MOD MDM: CPT | Performed by: PHYSICIAN ASSISTANT

## 2022-05-24 PROCEDURE — 99284 EMERGENCY DEPT VISIT MOD MDM: CPT

## 2022-05-24 PROCEDURE — 0241U HB NFCT DS VIR RESP RNA 4 TRGT: CPT | Performed by: PHYSICIAN ASSISTANT

## 2022-05-24 PROCEDURE — 83605 ASSAY OF LACTIC ACID: CPT | Performed by: PHYSICIAN ASSISTANT

## 2022-05-24 PROCEDURE — 93005 ELECTROCARDIOGRAM TRACING: CPT

## 2022-05-24 PROCEDURE — 71045 X-RAY EXAM CHEST 1 VIEW: CPT

## 2022-05-24 PROCEDURE — G1004 CDSM NDSC: HCPCS

## 2022-05-24 PROCEDURE — 70490 CT SOFT TISSUE NECK W/O DYE: CPT

## 2022-05-24 PROCEDURE — 36415 COLL VENOUS BLD VENIPUNCTURE: CPT | Performed by: PHYSICIAN ASSISTANT

## 2022-05-24 PROCEDURE — 80053 COMPREHEN METABOLIC PANEL: CPT | Performed by: PHYSICIAN ASSISTANT

## 2022-05-24 PROCEDURE — 84484 ASSAY OF TROPONIN QUANT: CPT | Performed by: PHYSICIAN ASSISTANT

## 2022-05-24 NOTE — PROGRESS NOTES
[facility]        NAME: Fanny Singh is a 61 y o  female  : 1965    MRN: [id number]  DATE: May 24, 2022  TIME: 6:08 PM    Assessment and Plan   COVID-19 [U07 1]  1  COVID-19  Transfer to other facility         Patient Instructions       Follow up with PCP in 3-5 days  Proceed to  ER if symptoms worsen  Chief Complaint     Chief Complaint   Patient presents with    COVID-19     Tested positive 4 days ago  History of Present Illness       Patient is here for evaluation laryngitis and is positive for COVID-19  Patient tested 4 days ago  Patient's symptoms were more severe on Friday and Saturday  She states her fever did break today but she has significant laryngitis and difficulty talking  Review of Systems   Review of Systems   Constitutional: Positive for activity change and fever  Negative for appetite change, chills, diaphoresis, fatigue and unexpected weight change  HENT: Positive for sore throat and voice change  Negative for congestion, ear discharge, ear pain, postnasal drip, rhinorrhea, sinus pressure, sinus pain and trouble swallowing  Eyes: Negative  Respiratory: Negative for cough, chest tightness and wheezing  Cardiovascular: Negative  Gastrointestinal: Negative  Neurological: Negative            Current Medications       Current Outpatient Medications:     albuterol (2 5 mg/3 mL) 0 083 % nebulizer solution, Inhale 1 each, Disp: , Rfl:     albuterol (PROVENTIL HFA,VENTOLIN HFA) 90 mcg/act inhaler, Inhale 2 puffs every 6 (six) hours as needed for wheezing or shortness of breath, Disp: 2 Inhaler, Rfl: 1    DULoxetine HCl 40 MG CPEP, Take 1 capsule (40 mg total) by mouth daily, Disp: 90 capsule, Rfl: 1    Ibuprofen 200 MG CAPS, Take by mouth, Disp: , Rfl:     levothyroxine 50 mcg tablet, Take 1 tablet (50 mcg total) by mouth daily in the early morning, Disp: 90 tablet, Rfl: 1    Diclofenac Sodium (VOLTAREN) 1 %, Apply 2 g topically 4 (four) times a day (Patient not taking: No sig reported), Disp: 100 g, Rfl: 3    DULoxetine (CYMBALTA) 20 mg capsule, Take 40 mg by mouth daily (Patient not taking: No sig reported), Disp: , Rfl:     ipratropium-albuterol (Combivent Respimat) inhaler, Inhale (Patient not taking: No sig reported), Disp: , Rfl:     omeprazole (PriLOSEC) 20 mg delayed release capsule, Take 20 mg by mouth daily (Patient not taking: No sig reported), Disp: , Rfl:     traMADol (ULTRAM) 50 mg tablet, Take 1 tablet (50 mg total) by mouth every 6 (six) hours as needed for moderate pain (Patient not taking: No sig reported), Disp: 120 tablet, Rfl: 0    Current Allergies     Allergies as of 2022 - Reviewed 2022   Allergen Reaction Noted    Ciprofloxacin Other (See Comments) 2012    Penicillins Other (See Comments) 2014            The following portions of the patient's history were reviewed and updated as appropriate: allergies, current medications, past family history, past medical history, past social history, past surgical history and problem list      Past Medical History:   Diagnosis Date    Anxiety     Asthma     Chronic fatigue     COPD (chronic obstructive pulmonary disease) ([facility] [address] [facility]     Depression     Fibromyalgia     Head injury     Hypothyroid     Morbid obesity with BMI of 60 0-69 9, adult ([facility] [address] [facility]     Sleep apnea     PT states a very mild case, prescribed CPAP but does not use machine         Past Surgical History:   Procedure Laterality Date    APPENDECTOMY       SECTION      ESOPHAGOGASTRODUODENOSCOPY      HERNIA REPAIR      Primary repair of incarcerated umbilical hernia - Dr Nena Sims      Hysterectomy/revise vagina    KNEE SURGERY      Arthrotomy with open meniscus repair     LAPAROSCOPIC CHOLECYSTECTOMY      TONSILECTOMY AND ADNOIDECTOMY      TUMOR REMOVAL      head    URETER SURGERY      repair    WISDOM TOOTH EXTRACTION         Family History   Problem Relation Age of Onset    Diabetes Family     Heart disease Family     Depression Mother     Anxiety disorder Mother     No Known Problems Father     No Known Problems Sister     Diabetes Brother     Hypertension Brother     No Known Problems Son     Breast cancer Paternal Grandmother     Esophageal cancer Paternal Grandfather     Skin cancer Paternal Grandfather     No Known Problems Maternal Grandmother     No Known Problems Maternal Grandfather     Stroke Neg Hx     Thyroid disease Neg Hx          Medications have been verified  Objective   Pulse (!) 106   Temp 98 6 °F (37 °C)   Resp 20   Ht 5' 3" (1 6 m)   Wt (!) 161 kg (356 lb)   SpO2 96%   BMI 63 06 kg/m²   No LMP recorded  Patient has had a hysterectomy  Physical Exam     Physical Exam  Vitals and nursing note reviewed  Constitutional:       General: She is not in acute distress  Appearance: She is well-developed  She is obese  She is not ill-appearing, toxic-appearing or diaphoretic  HENT:      Head: Normocephalic and atraumatic  Mouth/Throat:      Mouth: Mucous membranes are moist       Pharynx: Posterior oropharyngeal erythema present  No oropharyngeal exudate  Eyes:      General:         Right eye: No discharge  Left eye: No discharge  Extraocular Movements: Extraocular movements intact  Conjunctiva/sclera: Conjunctivae normal       Pupils: Pupils are equal, round, and reactive to light  Cardiovascular:      Rate and Rhythm: Normal rate and regular rhythm  Pulmonary:      Effort: No respiratory distress  Breath sounds: Normal breath sounds  No stridor  No wheezing, rhonchi or rales  Comments: Patient having some difficulty completing a sentence and having to take a deep breath  Skin:     General: Skin is warm and dry  Findings: No rash  Neurological:      General: No focal deficit present  Mental Status: She is alert and oriented to person, place, and time     Psychiatric: Mood and Affect: Mood normal          Behavior: Behavior normal          Thought Content: Thought content normal          Judgment: Judgment normal        Reviewed from conversation with patient's PCP  Given the patient's current presentation and medical history will refer the [facility] to [facility] now for further evaluation and treatment

## 2022-05-24 NOTE — TELEPHONE ENCOUNTER
Patient called stating that she has COVID she is having a great deal difficulty breathing she is barely able to speak on the phone on did advised that she is not vaccinated that she must meet seek immediate emergency care  I advised patient I will send squad to her home she declined  Patient verbalized that she would I did advised that I will check her chart to make sure that she does if not I will send squat to her house  Patient verbalized understanding of this

## 2022-05-25 VITALS
SYSTOLIC BLOOD PRESSURE: 132 MMHG | RESPIRATION RATE: 16 BRPM | OXYGEN SATURATION: 95 % | HEART RATE: 64 BPM | DIASTOLIC BLOOD PRESSURE: 71 MMHG | TEMPERATURE: 98.3 F

## 2022-05-25 LAB
2HR DELTA HS TROPONIN: 0 NG/L
ATRIAL RATE: 70 BPM
CARDIAC TROPONIN I PNL SERPL HS: 2 NG/L
P AXIS: 56 DEGREES
PR INTERVAL: 154 MS
QRS AXIS: 61 DEGREES
QRSD INTERVAL: 68 MS
QT INTERVAL: 376 MS
QTC INTERVAL: 406 MS
T WAVE AXIS: 50 DEGREES
VENTRICULAR RATE: 70 BPM

## 2022-05-25 PROCEDURE — 93010 ELECTROCARDIOGRAM REPORT: CPT | Performed by: INTERNAL MEDICINE

## 2022-05-25 PROCEDURE — 96374 THER/PROPH/DIAG INJ IV PUSH: CPT

## 2022-05-25 PROCEDURE — 84484 ASSAY OF TROPONIN QUANT: CPT | Performed by: PHYSICIAN ASSISTANT

## 2022-05-25 PROCEDURE — 36415 COLL VENOUS BLD VENIPUNCTURE: CPT | Performed by: PHYSICIAN ASSISTANT

## 2022-05-25 RX ORDER — DEXAMETHASONE SODIUM PHOSPHATE 4 MG/ML
8 INJECTION, SOLUTION INTRA-ARTICULAR; INTRALESIONAL; INTRAMUSCULAR; INTRAVENOUS; SOFT TISSUE ONCE
Status: COMPLETED | OUTPATIENT
Start: 2022-05-25 | End: 2022-05-25

## 2022-05-25 RX ORDER — AZITHROMYCIN 250 MG/1
TABLET, FILM COATED ORAL
Qty: 6 TABLET | Refills: 0 | Status: SHIPPED | OUTPATIENT
Start: 2022-05-25 | End: 2022-05-29

## 2022-05-25 RX ORDER — DEXAMETHASONE SODIUM PHOSPHATE 4 MG/ML
8 INJECTION, SOLUTION INTRA-ARTICULAR; INTRALESIONAL; INTRAMUSCULAR; INTRAVENOUS; SOFT TISSUE ONCE
Status: DISCONTINUED | OUTPATIENT
Start: 2022-05-25 | End: 2022-05-25

## 2022-05-25 RX ORDER — BUDESONIDE 180 UG/1
2 AEROSOL, POWDER RESPIRATORY (INHALATION) 2 TIMES DAILY
Qty: 1 EACH | Refills: 0 | Status: SHIPPED | OUTPATIENT
Start: 2022-05-25 | End: 2022-06-04

## 2022-05-25 RX ADMIN — TOPICAL ANESTHETIC 2 SPRAY: 200 SPRAY DENTAL; PERIODONTAL at 02:59

## 2022-05-25 RX ADMIN — DEXAMETHASONE SODIUM PHOSPHATE 8 MG: 4 INJECTION INTRA-ARTICULAR; INTRALESIONAL; INTRAMUSCULAR; INTRAVENOUS; SOFT TISSUE at 03:27

## 2022-05-25 NOTE — ED PROVIDER NOTES
History  Chief Complaint   Patient presents with    Sore Throat     Patient c/o sore throat, fever, and ear pain that began a "few days ago"  Friday night patient tested positive for covid via home test  Patient having a heard time to talk  Patient is a 48-year-old female presenting to the emergency room for evaluation of hoarse voice, and sore throat  Patient states on Friday she began having fever,cough, sore throat, and losing her voice  She took a home COVID test and she said it was faintly positive  She is not vaccinated  She states her fevers have resolved  However, she went to urgent care today and they referred her to the ER for her laryngitis  She states her biggest complaint at this time is her lack of voice and her sore throat  She denies any sensation that her throat is swollen or closing  Patient denies any chest pain, shortness of breath, dyspnea on exertion, dyspnea with speaking, headache, dizziness, abdominal pain, vomiting, diarrhea, drooling  History provided by:  Patient   used: No        Prior to Admission Medications   Prescriptions Last Dose Informant Patient Reported? Taking?    DULoxetine (CYMBALTA) 20 mg capsule   Yes No   Sig: Take 40 mg by mouth daily   Patient not taking: No sig reported   DULoxetine HCl 40 MG CPEP   No No   Sig: Take 1 capsule (40 mg total) by mouth daily   Diclofenac Sodium (VOLTAREN) 1 %   No No   Sig: Apply 2 g topically 4 (four) times a day   Patient not taking: No sig reported   Ibuprofen 200 MG CAPS   Yes No   Sig: Take by mouth   albuterol (2 5 mg/3 mL) 0 083 % nebulizer solution  Self Yes No   Sig: Inhale 1 each   albuterol (PROVENTIL HFA,VENTOLIN HFA) 90 mcg/act inhaler  Self No No   Sig: Inhale 2 puffs every 6 (six) hours as needed for wheezing or shortness of breath   ipratropium-albuterol (Combivent Respimat) inhaler  Self Yes No   Sig: Inhale   Patient not taking: No sig reported   levothyroxine 50 mcg tablet   No No Sig: Take 1 tablet (50 mcg total) by mouth daily in the early morning   omeprazole (PriLOSEC) 20 mg delayed release capsule  Self Yes No   Sig: Take 20 mg by mouth daily   Patient not taking: No sig reported   traMADol (ULTRAM) 50 mg tablet  Self No No   Sig: Take 1 tablet (50 mg total) by mouth every 6 (six) hours as needed for moderate pain   Patient not taking: No sig reported      Facility-Administered Medications: None       Past Medical History:   Diagnosis Date    Anxiety     Asthma     Chronic fatigue     COPD [facility]chronic obstructive pulmonary disease) ([facility])     Depression     Fibromyalgia     Head injury     Hypothyroid     Morbid obesity with BMI of 60 0-69 9, adult ([facility])     Sleep apnea     PT states a very mild case, prescribed CPAP but does not use machine  Past Surgical History:   Procedure Laterality Date    APPENDECTOMY       SECTION      ESOPHAGOGASTRODUODENOSCOPY      HERNIA REPAIR      Primary repair of incarcerated umbilical hernia - Dr Tidwell       Hysterectomy/revise vagina    KNEE SURGERY      Arthrotomy with open meniscus repair     LAPAROSCOPIC CHOLECYSTECTOMY      TONSILECTOMY AND ADNOIDECTOMY      TUMOR REMOVAL      head    URETER SURGERY      repair    WISDOM TOOTH EXTRACTION         Family History   Problem Relation Age of Onset    Diabetes Family     Heart disease Family     Depression Mother     Anxiety disorder Mother     No Known Problems Father     No Known Problems Sister     Diabetes Brother     Hypertension Brother     No Known Problems Son     Breast cancer Paternal Grandmother     Esophageal cancer Paternal Grandfather     Skin cancer Paternal Grandfather     No Known Problems Maternal Grandmother     No Known Problems Maternal Grandfather     Stroke Neg Hx     Thyroid disease Neg Hx      I have reviewed and agree with the history as documented      E-Cigarette/Vaping    E-Cigarette Use Never User E-Cigarette/Vaping Substances    Nicotine No     THC No     CBD No     Flavoring No     Other No     Unknown No      Social History     Tobacco Use    Smoking status: Former Smoker     Years: 23 50     Types: Cigarettes    Smokeless tobacco: Never Used    Tobacco comment: 3= PPD - As per [employer] Use    Vaping Use: Never used   Substance Use Topics    Alcohol use: Yes     Comment: rarely    Drug use: Not Currently       Review of Systems   Constitutional: Positive for fever  Negative for chills  HENT: Positive for sore throat and voice change  Negative for ear pain  Eyes: Negative for pain and visual disturbance  Respiratory: Positive for cough  Negative for shortness of breath  Cardiovascular: Negative for chest pain and palpitations  Gastrointestinal: Negative for abdominal pain and vomiting  Genitourinary: Negative for dysuria and hematuria  Musculoskeletal: Negative for arthralgias and back pain  Skin: Negative for color change and rash  Neurological: Negative for seizures and syncope  All other systems reviewed and are negative  Physical Exam  Physical Exam  Vitals and nursing note reviewed  Constitutional:       General: She is not in acute distress  Appearance: She is well-developed  She is obese  She is not ill-appearing  Comments: Patient is well-appearing  In no acute distress  Vitals are stable   HENT:      Head: Normocephalic and atraumatic  Mouth/Throat:      Pharynx: Posterior oropharyngeal erythema present        Comments: Posterior pharynx is erythematous  No swelling noted  No tripoding/drooling  No signs of peritonsillar abscess  Patient is speaking in full clear sentences, however she is whispering as she lost her voice and frequently clearing her throat  No stridor  No swelling under the tongue  No evidence of dyspnea with speaking  Eyes:      Conjunctiva/sclera: Conjunctivae normal    Cardiovascular:      Rate and Rhythm: Normal rate and regular rhythm  Heart sounds: No murmur heard  Pulmonary:      Effort: Pulmonary effort is normal  No respiratory distress  Breath sounds: Normal breath sounds  No stridor  No wheezing, rhonchi or rales  Comments: Lungs are clear  Chest:      Chest wall: No tenderness  Abdominal:      Palpations: Abdomen is soft  Tenderness: There is no abdominal tenderness  Musculoskeletal:      Cervical back: Neck supple  Skin:     General: Skin is warm and dry  Neurological:      Mental Status: She is alert  Vital Signs  ED Triage Vitals [05/24/22 1951]   Temperature Pulse Respirations Blood Pressure SpO2   98 3 °F (36 8 °C) 88 20 144/65 94 %      Temp Source Heart Rate Source Patient Position - Orthostatic VS BP Location FiO2 (%)   Oral Monitor Sitting Left arm --      Pain Score       --           Vitals:    05/25/22 0200 05/25/22 0330 05/25/22 0400 05/25/22 0445   BP: 134/68 131/71 132/71    Pulse: 66 64 62 64   Patient Position - Orthostatic VS:  Lying Lying        ED Medications  Medications   benzocaine (HURRICAINE) 20 % mucosal spray 2 spray (2 sprays Mucosal Given 5/25/22 0259)   dexamethasone (DECADRON) injection 8 mg (8 mg Intravenous Given 5/25/22 0327)       Diagnostic Studies  Results Reviewed     Procedure Component Value Units Date/Time    HS Troponin I 2hr [[id number]]  (Normal) Collected: 05/25/22 0042    Lab Status: Final result Specimen: Blood from Arm, Left Updated: 05/25/22 0122     hs TnI 2hr 2 ng/L      Delta 2hr hsTnI 0 ng/L     COVID/FLU/RSV [[id number]]  (Abnormal) Collected: 05/24/22 2210    Lab Status: Final result Specimen: Nares from Nose Updated: 05/24/22 2313     SARS-CoV-2 Positive     INFLUENZA A PCR Negative     INFLUENZA B PCR Negative     RSV PCR Negative    Narrative:      FOR PEDIATRIC PATIENTS - copy/paste COVID Guidelines URL to browser: [email or web address]    SARS-CoV-2 assay is a Nucleic Acid Amplification assay intended for the  qualitative detection of nucleic acid from SARS-CoV-2 in nasopharyngeal  swabs  Results are for the presumptive identification of SARS-CoV-2 RNA  Positive results are indicative of infection with SARS-CoV-2, the virus  causing COVID-19, but do not rule out bacterial infection or co-infection  with other viruses  Laboratories within the [place] and its  territories are required to report all positive results to the appropriate  public health authorities  Negative results do not preclude SARS-CoV-2  infection and should not be used as the sole basis for treatment or other  patient management decisions  Negative results must be combined with  clinical observations, patient history, and epidemiological information  This test has not been FDA cleared or approved  This test has been authorized by FDA under an Emergency Use Authorization  (EUA)  This test is only authorized for the duration of time the  declaration that circumstances exist justifying the authorization of the  emergency use of an in vitro diagnostic tests for detection of SARS-CoV-2  virus and/or diagnosis of COVID-19 infection under section 564(b)(1) of  the Act, 21 U  S C  240XFY-8(J)(2), unless the authorization is terminated  or revoked sooner  The test has been validated but independent review by FDA  and CLIA is pending  Test performed using [employer] Gene[employer]pert: This RT-PCR assay targets N2,  a region unique to SARS-CoV-2  A conserved region in the E-gene was chosen  for pan-Sarbecovirus detection which includes SARS-CoV-2      Strep A PCR [[id number]]  (Normal) Collected: 05/24/22 2210    Lab Status: Final result Specimen: Throat Updated: 05/24/22 2301     STREP A PCR Not Detected    HS Troponin 0hr (reflex protocol) [[id number]]  (Normal) Collected: 05/24/22 2210    Lab Status: Final result Specimen: Blood from Arm, Left Updated: 05/24/22 2254     hs TnI 0hr 2 ng/L     Lactic acid, plasma [[id number]]  (Abnormal) Collected: 05/24/22 2210    Lab Status: Final result Specimen: Blood from Arm, Left Updated: 05/24/22 2253     LACTIC ACID 0 4 mmol/L     Narrative:      Result may be elevated if tourniquet was used during collection      Comprehensive metabolic panel [[id number]] Collected: 05/24/22 2210    Lab Status: Final result Specimen: Blood from Arm, Left Updated: 05/24/22 2247     Sodium 139 mmol/L      Potassium 3 8 mmol/L      Chloride 104 mmol/L      CO2 28 mmol/L      ANION GAP 7 mmol/L      BUN 16 mg/dL      Creatinine 0 92 mg/dL      Glucose 92 mg/dL      Calcium 8 6 mg/dL      AST 22 U/L      ALT 21 U/L      Alkaline Phosphatase 72 U/L      Total Protein 7 4 g/dL      Albumin 4 0 g/dL      Total Bilirubin 0 40 mg/dL      eGFR 69 ml/min/1 73sq m     Narrative:      National Kidney Disease Foundation guidelines for Chronic Kidney Disease (CKD):     Stage 1 with normal or high GFR (GFR > 90 mL/min/1 73 square meters)    Stage 2 Mild CKD (GFR = 60-89 mL/min/1 73 square meters)    Stage 3A Moderate CKD (GFR = 45-59 mL/min/1 73 square meters)    Stage 3B Moderate CKD (GFR = 30-44 mL/min/1 73 square meters)    Stage 4 Severe CKD (GFR = 15-29 mL/min/1 73 square meters)    Stage 5 End Stage CKD (GFR <15 mL/min/1 73 square meters)  Note: GFR calculation is accurate only with a steady state creatinine    CBC and differential [[id number]]  (Abnormal) Collected: 05/24/22 2210    Lab Status: Final result Specimen: Blood from Arm, Left Updated: 05/24/22 2224     WBC 4 32 Thousand/uL      RBC 4 84 Million/uL      Hemoglobin 14 6 g/dL      Hematocrit 44 2 %      MCV 91 fL      MCH 30 2 pg      MCHC 33 0 g/dL      RDW 13 2 %      MPV 10 6 fL      Platelets 614 Thousands/uL      nRBC 0 /100 WBCs      Neutrophils Relative 46 %      Immat GRANS % 1 %      Lymphocytes Relative 38 %      Monocytes Relative 13 %      Eosinophils Relative 1 %      Basophils Relative 1 %      Neutrophils Absolute 2 06 Thousands/µL Immature Grans Absolute 0 02 Thousand/uL      Lymphocytes Absolute 1 63 Thousands/µL      Monocytes Absolute 0 57 Thousand/µL      Eosinophils Absolute 0 02 Thousand/µL      Basophils Absolute 0 02 Thousands/µL                  CT soft tissue neck wo contrast   Final Result by Maria Esther Mixon MD (05/25 0115)      No evidence of pharyngitis, abscess or lymphadenopathy  Workstation performed: [id number]         XR chest 1 view portable   ED Interpretation by Matthew Trujillo PA-C (05/25 0059)   No acute findings       Final Result by Marco Carroll DO (05/25 9240)      Lung volumes are low but no acute cardiopulmonary disease is seen  Workstation performed: [id number]                    Procedures  ECG 12 Lead Documentation Only    Date/Time: 5/24/2022 10:19 PM  Performed by: Matthew Trujlilo PA-C  Authorized by: Matthew Trujillo PA-C     ECG reviewed by me, the ED Provider: yes    Patient location:  ED  Rate:     ECG rate:  70    ECG rate assessment: normal    Rhythm:     Rhythm: sinus rhythm    Ectopy:     Ectopy: none    QRS:     QRS axis:  Normal  Conduction:     Conduction: normal    ST segments:     ST segments:  Normal  T waves:     T waves: normal               ED Course  ED Course as of 05/25/22 0638   Tue May 24, 2022   2158 Per Radiology, there is a national shortage of IV contrast, all CT scans are to be ordered without contrast at this time unless otherwise indicated by radiologist for emergent situations including stroke alerts, rule out aortic dissection, trauma alerts, or high suspicion for pulmonary embolism  Will order CT without contrast at this time  2320 SARS-COV-2(!): Positive   Wed May 25, 2022   9028 Patient tolerated PO fluids in the ER without any issues   Denied any difficulty swallowing   0425 Patient was ambulated and maintained good saturation, she denied any tachypnea or shortness of breath or sensation that her throat is swelling  Patient feels more than comfortable being discharged at this time  0430 Re-evaluation at bedside, patient feels better, would like to go home  Updated patient on labs and imaging results  Counseling as below  The appropriate recommended follow up and warning signs for return to the nearest ED were explained  patient's questions answered; Patient is competent and reliable, verbalized understanding of all that was explained, and agrees with the disposition and further plan of treatment  patient was additionally provided with detailed follow up care instructions, phone numbers to call within our institution for other concerns or inquiries  Advised to follow up with PCP  Pt is in no acute distress and is stable for discharge at this time  HEART Risk Score    Flowsheet Row Most Recent Value   Heart Score Risk Calculator    History 0 Filed at: 05/25/2022 0400   ECG 0 Filed at: 05/25/2022 0400   Age 1 Filed at: 05/25/2022 0400   Risk Factors 1 Filed at: 05/25/2022 0400   Troponin 0 Filed at: 05/25/2022 0400   HEART Score 2 Filed at: 05/25/2022 0400            MDM  Number of Diagnoses or Management Options  COVID-19: new and requires workup  Laryngitis: new and requires workup  Pharyngitis, unspecified etiology: new and requires workup  Diagnosis management comments: Patient presenting with sore throat and laryngitis  Patient states her symptoms began Friday  Had associated fevers which resolved  Took a home COVID test which was faintly positive  Patient's COVID test is positive in the ER today  Patient was referred from urgent care for further evaluation  On exam patient is whispering as she has lost her voice  She is frequently clearing her throat  However she is speaking in full clear sentences  She is in no acute distress  She is not tripoding or drooling  Posterior pharyngeal erythema is present  No swelling, no signs of peritonsillar abscess  No swelling under the tongue  Patient denies chest pain or shortness of breath  Patient is not vaccinated for COVID  Her lungs are clear  Patient CBCs unremarkable for any leukocytosis  No lactic acidosis  CMP is normal   Two normal troponins any normal EKG  Chest x-ray unremarkable  CT of the neck is unremarkable for any acute findings  Patient was treated with Decadron and Hurricaine spray and had relief of some of her symptoms  Patient was ambulated in the emergency room and maintained a good saturation  She did not complain of any chest tightness or shortness of breath  Patient tolerated p o  fluids and crackers without any difficulty swallowing  Patient was re-evaluated multiple times during ER stay and had no acute changes  Patient is more than comfortable being discharged at this time  Patient is likely just experiencing pharyngitis and laryngitis from COVID  Will treat patient with azithromycin for her cough as she does have a history of COPD  Patient was explained that she is high risk as she has history of COPD and asthma and is not vaccinated  Patient was strongly encouraged to follow-up with her primary doctor and to return to the emergency room if anything acutely changes  Message sent to covid follow up team for [facility]         Amount and/or Complexity of Data Reviewed  Clinical lab tests: ordered and reviewed  Tests in the radiology section of CPT®: ordered and reviewed    Patient Progress  Patient progress: stable      Disposition  Final diagnoses:   Pharyngitis, unspecified etiology   COVID-19   Laryngitis     Time reflects when diagnosis was documented in both MDM as applicable and the Disposition within this note     Time User Action Codes Description Comment    5/25/2022  2:53 AM Kaycee Wakefield Add [J02 9] Pharyngitis, unspecified etiology     5/25/2022  2:53 AM Kaycee Wakefield Add [U07 1] COVID-19     5/25/2022  2:53 AM Kaycee Sanchez [J04 0] Laryngitis       ED Disposition     ED Disposition   Discharge Condition   Stable    Date/Time   Wed May 25, 2022  4:44 AM    Comment   Nathen Read discharge to home/self care  Follow-up Information     Follow up With Specialties Details Why Contact Info Additional APPLE Lam Nurse Practitioner Schedule an appointment as soon as possible for a visit   [address] [facility] [address] [facility] [address]  [facility] Emergency Department Emergency Medicine  If symptoms worsen [address] Emergency Department, [address], [place], [place], [address]          Discharge Medication List as of 5/25/2022  4:48 AM      START taking these medications    Details   azithromycin (ZITHROMAX) 250 mg tablet Take 2 tablets today then 1 tablet daily x 4 days, Normal      budesonide (Pulmicort Flexhaler) 180 MCG/ACT inhaler Inhale 2 puffs  in the morning and 2 puffs in the evening  Do all this for 10 days  Rinse mouth after use   , Starting Wed 5/25/2022, Until Sat 6/4/2022, Normal         CONTINUE these medications which have NOT CHANGED    Details   albuterol (2 5 mg/3 mL) 0 083 % nebulizer solution Inhale 1 each, Starting Tue 2/19/2013, Historical Med      albuterol (PROVENTIL HFA,VENTOLIN HFA) 90 mcg/act inhaler Inhale 2 puffs every 6 (six) hours as needed for wheezing or shortness of breath, Starting Tue 11/17/2020, Normal      Diclofenac Sodium (VOLTAREN) 1 % Apply 2 g topically 4 (four) times a day, Starting Wed 5/26/2021, Normal      !! DULoxetine (CYMBALTA) 20 mg capsule Take 40 mg by mouth daily, Historical Med      !!  DULoxetine HCl 40 MG CPEP Take 1 capsule (40 mg total) by mouth daily, Starting Mon 7/26/2021, Normal      Ibuprofen 200 MG CAPS Take by mouth, Historical Med      ipratropium-albuterol (Combivent Respimat) inhaler Inhale, Starting Thu 7/9/2015, Historical Med      levothyroxine 50 mcg tablet Take 1 tablet (50 mcg total) by mouth daily in the early morning, Starting Wed 9/15/2021, Normal      omeprazole (PriLOSEC) 20 mg delayed release capsule Take 20 mg by mouth daily, Historical Med      traMADol (ULTRAM) 50 mg tablet Take 1 tablet (50 mg total) by mouth every 6 (six) hours as needed for moderate pain, Starting Fri 2/26/2021, Normal       !! - Potential duplicate medications found  Please discuss with provider  No discharge procedures on file      PDMP Review     None          ED Provider  Electronically Signed by           López Menon PA-C  05/25/22 6467

## 2022-06-07 ENCOUNTER — TELEPHONE (OUTPATIENT)
Dept: FAMILY MEDICINE CLINIC | Facility: CLINIC | Age: 57
End: 2022-06-07

## 2022-06-08 ENCOUNTER — OFFICE VISIT (OUTPATIENT)
Dept: FAMILY MEDICINE CLINIC | Facility: CLINIC | Age: 57
End: 2022-06-08
Payer: COMMERCIAL

## 2022-06-08 VITALS
BODY MASS INDEX: 51.91 KG/M2 | TEMPERATURE: 98.6 F | HEART RATE: 88 BPM | HEIGHT: 63 IN | OXYGEN SATURATION: 98 % | SYSTOLIC BLOOD PRESSURE: 114 MMHG | WEIGHT: 293 LBS | DIASTOLIC BLOOD PRESSURE: 72 MMHG | RESPIRATION RATE: 20 BRPM

## 2022-06-08 DIAGNOSIS — U09.9 COVID-19 LONG HAULER MANIFESTING CHRONIC COUGH: Primary | ICD-10-CM

## 2022-06-08 DIAGNOSIS — R53.83 OTHER FATIGUE: ICD-10-CM

## 2022-06-08 DIAGNOSIS — F41.9 ANXIETY: ICD-10-CM

## 2022-06-08 DIAGNOSIS — R05.3 COVID-19 LONG HAULER MANIFESTING CHRONIC COUGH: Primary | ICD-10-CM

## 2022-06-08 LAB
SARS-COV-2 AG UPPER RESP QL IA: POSITIVE
VALID CONTROL: ABNORMAL

## 2022-06-08 PROCEDURE — 99214 OFFICE O/P EST MOD 30 MIN: CPT | Performed by: NURSE PRACTITIONER

## 2022-06-08 PROCEDURE — 87811 SARS-COV-2 COVID19 W/OPTIC: CPT | Performed by: NURSE PRACTITIONER

## 2022-06-08 RX ORDER — DULOXETIN HYDROCHLORIDE 60 MG/1
60 CAPSULE, DELAYED RELEASE ORAL DAILY
COMMUNITY
Start: 2022-06-03

## 2022-06-08 RX ORDER — BENZONATATE 100 MG/1
100 CAPSULE ORAL 3 TIMES DAILY PRN
Qty: 60 CAPSULE | Refills: 1 | Status: SHIPPED | OUTPATIENT
Start: 2022-06-08

## 2022-06-08 NOTE — PROGRESS NOTES
Assessment/Plan:  Patient had COVID  greater than 10 days ago  She has significant improvement in overall symptoms but remains with anxiety fatigue did advise her will do a rapid COVID test which remains pos and can be for up to 3 months after infection and send medication to the pharmacy for her mild chronic cough Benzonate to be taken t i d  As needed  Routine labs were placed in her chart she has complete those at her earliest convenience  Patient verbalized she will and will contact her for follow-up visit  Dosing all possible side effects of the prescribed medications or medications that had been prescribed in the past were reviewed and all questions were answered  Patient verbalized agreement and understanding of the plan of care as outlined during the office visit today return to office as indicated or sooner if a problem arises  Problem List Items Addressed This Visit        Other    Anxiety    Relevant Orders    CBC and differential    Comprehensive metabolic panel    Lipid panel    UA w Reflex to Microscopic w Reflex to Culture    TSH, 3rd generation with Free T4 reflex      Other Visit Diagnoses     COVID-19 long hauler manifesting chronic cough    -  Primary    Other fatigue        Relevant Orders    CBC and differential    Comprehensive metabolic panel    Lipid panel    UA w Reflex to Microscopic w Reflex to Culture    TSH, 3rd generation with Free T4 reflex    BMI 60 0-69 9, adult (HCC)        Relevant Orders    CBC and differential    Comprehensive metabolic panel    Lipid panel    UA w Reflex to Microscopic w Reflex to Culture    TSH, 3rd generation with Free T4 reflex            Subjective:      Patient ID: Ellen Garcia is a 61 y o  female      HPI    The following portions of the patient's history were reviewed and updated as appropriate: allergies, current medications, past family history, past medical history, past social history, past surgical history and problem list     Review of Systems   Constitutional: Positive for fatigue  Negative for appetite change and fever  HENT: Negative for sinus pressure and sore throat  Eyes: Negative for pain  Respiratory: Positive for cough  Negative for shortness of breath  Cardiovascular: Negative for chest pain  Gastrointestinal: Negative for abdominal pain  Genitourinary: Negative for dysuria  Musculoskeletal: Negative for arthralgias and myalgias  Skin: Negative for color change  Neurological: Negative for light-headedness  Psychiatric/Behavioral: Negative for behavioral problems  Objective:      /72 (BP Location: Left arm, Patient Position: Sitting, Cuff Size: Large)   Pulse 88   Temp 98 6 °F (37 °C) (Temporal)   Resp 20   Ht 5' 3" (1 6 m)   Wt (!) 161 kg (356 lb)   SpO2 98%   BMI 63 06 kg/m²          Physical Exam  Vitals and nursing note reviewed  Constitutional:       General: She is not in acute distress  Appearance: She is well-developed  She is not diaphoretic  HENT:      Head: Normocephalic and atraumatic  Right Ear: External ear normal       Left Ear: External ear normal       Mouth/Throat:      Pharynx: Oropharynx is clear  Eyes:      Pupils: Pupils are equal, round, and reactive to light  Cardiovascular:      Rate and Rhythm: Normal rate and regular rhythm  Heart sounds: Normal heart sounds  Pulmonary:      Effort: Pulmonary effort is normal       Breath sounds: Normal breath sounds  Abdominal:      Palpations: Abdomen is soft  Musculoskeletal:         General: Normal range of motion  Cervical back: Normal range of motion and neck supple  Skin:     General: Skin is dry  Neurological:      Mental Status: She is alert and oriented to person, place, and time  Psychiatric:         Behavior: Behavior normal          Thought Content:  Thought content normal

## 2022-06-10 DIAGNOSIS — E01.0 THYROMEGALY: ICD-10-CM

## 2022-06-10 RX ORDER — LEVOTHYROXINE SODIUM 0.05 MG/1
50 TABLET ORAL
Qty: 90 TABLET | Refills: 1 | OUTPATIENT
Start: 2022-06-10

## 2022-06-15 ENCOUNTER — TELEPHONE (OUTPATIENT)
Dept: OBGYN CLINIC | Facility: CLINIC | Age: 57
End: 2022-06-15

## 2022-06-15 DIAGNOSIS — N83.202 CYST OF LEFT OVARY: Primary | ICD-10-CM

## 2022-06-15 NOTE — TELEPHONE ENCOUNTER
----- Message from Pura Castillo MD sent at 6/14/2022  5:54 PM EDT -----  Please notify and order for pt - repeat pelvic US is recommended to monitor ovarian cyst  Please also offer to schedule annual exam

## 2022-06-21 ENCOUNTER — APPOINTMENT (OUTPATIENT)
Dept: LAB | Facility: MEDICAL CENTER | Age: 57
End: 2022-06-21
Payer: COMMERCIAL

## 2022-06-21 DIAGNOSIS — R53.83 OTHER FATIGUE: ICD-10-CM

## 2022-06-21 DIAGNOSIS — F41.9 ANXIETY: ICD-10-CM

## 2022-06-21 LAB
ALBUMIN SERPL BCP-MCNC: 3.5 G/DL (ref 3.5–5)
ALP SERPL-CCNC: 96 U/L (ref 46–116)
ALT SERPL W P-5'-P-CCNC: 184 U/L (ref 12–78)
ANION GAP SERPL CALCULATED.3IONS-SCNC: 5 MMOL/L (ref 4–13)
AST SERPL W P-5'-P-CCNC: 70 U/L (ref 5–45)
BACTERIA UR QL AUTO: ABNORMAL /HPF
BASOPHILS # BLD AUTO: 0.05 THOUSANDS/ΜL (ref 0–0.1)
BASOPHILS NFR BLD AUTO: 1 % (ref 0–1)
BILIRUB SERPL-MCNC: 0.67 MG/DL (ref 0.2–1)
BILIRUB UR QL STRIP: NEGATIVE
BUN SERPL-MCNC: 17 MG/DL (ref 5–25)
CALCIUM SERPL-MCNC: 9.7 MG/DL (ref 8.3–10.1)
CHLORIDE SERPL-SCNC: 108 MMOL/L (ref 100–108)
CHOLEST SERPL-MCNC: 164 MG/DL
CLARITY UR: ABNORMAL
CO2 SERPL-SCNC: 27 MMOL/L (ref 21–32)
COLOR UR: YELLOW
CREAT SERPL-MCNC: 0.99 MG/DL (ref 0.6–1.3)
EOSINOPHIL # BLD AUTO: 0.24 THOUSAND/ΜL (ref 0–0.61)
EOSINOPHIL NFR BLD AUTO: 4 % (ref 0–6)
ERYTHROCYTE [DISTWIDTH] IN BLOOD BY AUTOMATED COUNT: 13.6 % (ref 11.6–15.1)
GFR SERPL CREATININE-BSD FRML MDRD: 63 ML/MIN/1.73SQ M
GLUCOSE P FAST SERPL-MCNC: 118 MG/DL (ref 65–99)
GLUCOSE UR STRIP-MCNC: NEGATIVE MG/DL
HCT VFR BLD AUTO: 44.1 % (ref 34.8–46.1)
HDLC SERPL-MCNC: 46 MG/DL
HGB BLD-MCNC: 14.4 G/DL (ref 11.5–15.4)
HGB UR QL STRIP.AUTO: NEGATIVE
HYALINE CASTS #/AREA URNS LPF: ABNORMAL /LPF
IMM GRANULOCYTES # BLD AUTO: 0.06 THOUSAND/UL (ref 0–0.2)
IMM GRANULOCYTES NFR BLD AUTO: 1 % (ref 0–2)
KETONES UR STRIP-MCNC: NEGATIVE MG/DL
LDLC SERPL CALC-MCNC: 101 MG/DL (ref 0–100)
LEUKOCYTE ESTERASE UR QL STRIP: NEGATIVE
LYMPHOCYTES # BLD AUTO: 1.59 THOUSANDS/ΜL (ref 0.6–4.47)
LYMPHOCYTES NFR BLD AUTO: 29 % (ref 14–44)
MCH RBC QN AUTO: 31 PG (ref 26.8–34.3)
MCHC RBC AUTO-ENTMCNC: 32.7 G/DL (ref 31.4–37.4)
MCV RBC AUTO: 95 FL (ref 82–98)
MONOCYTES # BLD AUTO: 0.48 THOUSAND/ΜL (ref 0.17–1.22)
MONOCYTES NFR BLD AUTO: 9 % (ref 4–12)
MUCOUS THREADS UR QL AUTO: ABNORMAL
NEUTROPHILS # BLD AUTO: 2.99 THOUSANDS/ΜL (ref 1.85–7.62)
NEUTS SEG NFR BLD AUTO: 56 % (ref 43–75)
NITRITE UR QL STRIP: NEGATIVE
NON-SQ EPI CELLS URNS QL MICRO: ABNORMAL /HPF
NONHDLC SERPL-MCNC: 118 MG/DL
NRBC BLD AUTO-RTO: 0 /100 WBCS
PH UR STRIP.AUTO: 6 [PH]
PLATELET # BLD AUTO: 249 THOUSANDS/UL (ref 149–390)
PMV BLD AUTO: 11.9 FL (ref 8.9–12.7)
POTASSIUM SERPL-SCNC: 4.2 MMOL/L (ref 3.5–5.3)
PROT SERPL-MCNC: 7.8 G/DL (ref 6.4–8.2)
PROT UR STRIP-MCNC: ABNORMAL MG/DL
RBC # BLD AUTO: 4.65 MILLION/UL (ref 3.81–5.12)
RBC #/AREA URNS AUTO: ABNORMAL /HPF
SODIUM SERPL-SCNC: 140 MMOL/L (ref 136–145)
SP GR UR STRIP.AUTO: 1.03 (ref 1–1.03)
TRIGL SERPL-MCNC: 83 MG/DL
TSH SERPL DL<=0.05 MIU/L-ACNC: 2.75 UIU/ML (ref 0.45–4.5)
UROBILINOGEN UR STRIP-ACNC: <2 MG/DL
WBC # BLD AUTO: 5.41 THOUSAND/UL (ref 4.31–10.16)
WBC #/AREA URNS AUTO: ABNORMAL /HPF

## 2022-06-21 PROCEDURE — 36415 COLL VENOUS BLD VENIPUNCTURE: CPT

## 2022-06-21 PROCEDURE — 85025 COMPLETE CBC W/AUTO DIFF WBC: CPT

## 2022-06-21 PROCEDURE — 80053 COMPREHEN METABOLIC PANEL: CPT

## 2022-06-21 PROCEDURE — 84443 ASSAY THYROID STIM HORMONE: CPT

## 2022-06-21 PROCEDURE — 81001 URINALYSIS AUTO W/SCOPE: CPT | Performed by: NURSE PRACTITIONER

## 2022-06-21 PROCEDURE — 80061 LIPID PANEL: CPT

## 2022-06-22 ENCOUNTER — TELEPHONE (OUTPATIENT)
Dept: ADMINISTRATIVE | Facility: OTHER | Age: 57
End: 2022-06-22

## 2022-06-22 DIAGNOSIS — E01.0 THYROMEGALY: ICD-10-CM

## 2022-06-22 DIAGNOSIS — E03.8 OTHER SPECIFIED HYPOTHYROIDISM: Primary | ICD-10-CM

## 2022-06-22 RX ORDER — LEVOTHYROXINE SODIUM 0.05 MG/1
50 TABLET ORAL
Qty: 90 TABLET | Refills: 1 | Status: SHIPPED | OUTPATIENT
Start: 2022-06-22 | End: 2022-11-30

## 2022-06-22 NOTE — TELEPHONE ENCOUNTER
06/22/22 11:32 AM    The patient was called and a message was left to call the PCP office regarding an open order  Thank you    Faby Meng [facility]

## 2022-06-29 ENCOUNTER — TELEPHONE (OUTPATIENT)
Dept: OBGYN CLINIC | Age: 57
End: 2022-06-29

## 2022-07-07 ENCOUNTER — OFFICE VISIT (OUTPATIENT)
Dept: FAMILY MEDICINE CLINIC | Facility: CLINIC | Age: 57
End: 2022-07-07
Payer: COMMERCIAL

## 2022-07-07 ENCOUNTER — TELEPHONE (OUTPATIENT)
Dept: FAMILY MEDICINE CLINIC | Facility: CLINIC | Age: 57
End: 2022-07-07

## 2022-07-07 VITALS
SYSTOLIC BLOOD PRESSURE: 120 MMHG | BODY MASS INDEX: 51.91 KG/M2 | DIASTOLIC BLOOD PRESSURE: 78 MMHG | RESPIRATION RATE: 20 BRPM | WEIGHT: 293 LBS | OXYGEN SATURATION: 98 % | HEIGHT: 63 IN | TEMPERATURE: 97.3 F | HEART RATE: 76 BPM

## 2022-07-07 DIAGNOSIS — R73.9 HYPERGLYCEMIA: ICD-10-CM

## 2022-07-07 DIAGNOSIS — Z00.01 ENCOUNTER FOR ROUTINE ADULT HEALTH EXAMINATION WITH ABNORMAL FINDINGS: Primary | ICD-10-CM

## 2022-07-07 DIAGNOSIS — R74.8 ELEVATED LIVER ENZYMES: ICD-10-CM

## 2022-07-07 DIAGNOSIS — R10.11 RIGHT UPPER QUADRANT PAIN: ICD-10-CM

## 2022-07-07 DIAGNOSIS — E03.9 HYPOTHYROIDISM, UNSPECIFIED TYPE: ICD-10-CM

## 2022-07-07 PROCEDURE — 99396 PREV VISIT EST AGE 40-64: CPT | Performed by: NURSE PRACTITIONER

## 2022-07-07 NOTE — PROGRESS NOTES
Assessment/Plan:  Adult health physical   Dosing all possible side effects of the prescribed medications or medications that had been prescribed in the past were reviewed and all questions were answered  Patient verbalized agreement and understanding of the plan of care as outlined during the office visit today return to office as indicated or sooner if a problem arises  Problem List Items Addressed This Visit        Endocrine    Hypothyroid    Relevant Orders    TSH, 3rd generation      Other Visit Diagnoses     Encounter for routine adult health examination with abnormal findings    -  Primary    Relevant Orders    CBC and differential    Comprehensive metabolic panel    Lipid panel    TSH, 3rd generation    UA w Reflex to Microscopic w Reflex to Culture    Elevated liver enzymes        Relevant Orders    US abdomen complete    Comprehensive metabolic panel    Right upper quadrant pain        Relevant Orders    US abdomen complete    Hyperglycemia        Relevant Orders    Comprehensive metabolic panel    Hemoglobin A1C            Subjective:      Patient ID: Katlin Krause is a 61 y o  female  Patient is here for routine follow-up  To review most recent labs  To also discuss current state of health and any new problems that they may be experiencing  Patient states that medications taken as prescribed and very well tolerated no new complaints at this time  The following portions of the patient's history were reviewed and updated as appropriate: allergies, current medications, past family history, past medical history, past social history, past surgical history and problem list     Review of Systems   Constitutional: Negative for appetite change and fever  HENT: Negative for sinus pressure and sore throat  Eyes: Negative for pain  Respiratory: Negative for shortness of breath  Cardiovascular: Negative for chest pain     Gastrointestinal: Positive for abdominal pain (under r rib cage)    Genitourinary: Negative for dysuria  Musculoskeletal: Negative for arthralgias and myalgias  Skin: Negative for color change  Neurological: Negative for light-headedness  Psychiatric/Behavioral: Negative for behavioral problems  Objective:      Pulse 76   Temp (!) 97 3 °F (36 3 °C) (Temporal)   Resp 20   Ht 5' 3" (1 6 m)   Wt (!) 163 kg (359 lb)   SpO2 98%   BMI 63 59 kg/m²          Physical Exam  Vitals and nursing note reviewed  Constitutional:       General: She is not in acute distress  Appearance: She is well-developed  She is obese  She is not diaphoretic  HENT:      Head: Normocephalic and atraumatic  Mouth/Throat:      Pharynx: Oropharynx is clear  Eyes:      Pupils: Pupils are equal, round, and reactive to light  Cardiovascular:      Rate and Rhythm: Normal rate and regular rhythm  Heart sounds: Normal heart sounds  Pulmonary:      Effort: Pulmonary effort is normal       Breath sounds: Normal breath sounds  Abdominal:      Palpations: Abdomen is soft  Tenderness: There is abdominal tenderness (ruq tenderness to deep palpation)  Musculoskeletal:         General: Normal range of motion  Cervical back: Normal range of motion and neck supple  Skin:     General: Skin is dry  Neurological:      Mental Status: She is alert and oriented to person, place, and time  Psychiatric:         Behavior: Behavior normal          Thought Content:  Thought content normal

## 2022-07-07 NOTE — TELEPHONE ENCOUNTER
Patient spoke with insurance and they wont cover unless you put in an authorization for aquatic therapy at the [facility]

## 2022-07-15 ENCOUNTER — HOSPITAL ENCOUNTER (OUTPATIENT)
Dept: RADIOLOGY | Facility: MEDICAL CENTER | Age: 57
Discharge: HOME | End: 2022-07-15
Payer: COMMERCIAL

## 2022-07-15 DIAGNOSIS — R74.8 ELEVATED LIVER ENZYMES: ICD-10-CM

## 2022-07-15 DIAGNOSIS — R10.11 RIGHT UPPER QUADRANT PAIN: ICD-10-CM

## 2022-07-15 PROCEDURE — 76705 ECHO EXAM OF ABDOMEN: CPT

## 2022-07-19 DIAGNOSIS — K76.0 FATTY LIVER: Primary | ICD-10-CM

## 2022-07-19 DIAGNOSIS — R74.8 ELEVATED LIVER ENZYMES: ICD-10-CM

## 2022-07-29 ENCOUNTER — CONSULT (OUTPATIENT)
Dept: GASTROENTEROLOGY | Facility: CLINIC | Age: 57
End: 2022-07-29
Payer: COMMERCIAL

## 2022-07-29 VITALS
WEIGHT: 293 LBS | BODY MASS INDEX: 51.91 KG/M2 | HEART RATE: 88 BPM | SYSTOLIC BLOOD PRESSURE: 124 MMHG | DIASTOLIC BLOOD PRESSURE: 95 MMHG | HEIGHT: 63 IN

## 2022-07-29 DIAGNOSIS — K76.0 FATTY LIVER: Primary | ICD-10-CM

## 2022-07-29 DIAGNOSIS — R74.8 ELEVATED LIVER ENZYMES: ICD-10-CM

## 2022-07-29 PROCEDURE — 99204 OFFICE O/P NEW MOD 45 MIN: CPT | Performed by: PHYSICIAN ASSISTANT
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
## 2022-08-29 ENCOUNTER — TELEPHONE (OUTPATIENT)
Dept: GASTROENTEROLOGY | Facility: CLINIC | Age: 57
End: 2022-08-29

## 2022-08-29 NOTE — TELEPHONE ENCOUNTER
LMOM for pt - need to reschedule the 9/6/22 appt w/ Lazaro Boland  If pt calls back please reschedule  I will follow up in a few days

## 2022-09-02 ENCOUNTER — APPOINTMENT (OUTPATIENT)
Dept: LAB | Facility: MEDICAL CENTER | Age: 57
End: 2022-09-02
Payer: COMMERCIAL

## 2022-09-02 DIAGNOSIS — K76.0 FATTY LIVER: ICD-10-CM

## 2022-09-02 DIAGNOSIS — R74.8 ELEVATED LIVER ENZYMES: ICD-10-CM

## 2022-09-02 LAB
ALBUMIN SERPL BCP-MCNC: 3.8 G/DL (ref 3.5–5)
ALP SERPL-CCNC: 79 U/L (ref 46–116)
ALT SERPL W P-5'-P-CCNC: 43 U/L (ref 12–78)
AST SERPL W P-5'-P-CCNC: 30 U/L (ref 5–45)
BILIRUB DIRECT SERPL-MCNC: 0.15 MG/DL (ref 0–0.2)
BILIRUB SERPL-MCNC: 0.51 MG/DL (ref 0.2–1)
FERRITIN SERPL-MCNC: 94 NG/ML (ref 8–388)
HBV CORE AB SER QL: NORMAL
HBV CORE IGM SER QL: NORMAL
HBV SURFACE AG SER QL: NORMAL
HCV AB SER QL: NORMAL
INR PPP: 1.02 (ref 0.84–1.19)
IRON SATN MFR SERPL: 26 % (ref 15–50)
IRON SERPL-MCNC: 83 UG/DL (ref 50–170)
PROT SERPL-MCNC: 8.2 G/DL (ref 6.4–8.4)
PROTHROMBIN TIME: 13.6 SECONDS (ref 11.6–14.5)
TIBC SERPL-MCNC: 314 UG/DL (ref 250–450)

## 2022-09-02 PROCEDURE — 86705 HEP B CORE ANTIBODY IGM: CPT

## 2022-09-02 PROCEDURE — 86039 ANTINUCLEAR ANTIBODIES (ANA): CPT

## 2022-09-02 PROCEDURE — 82728 ASSAY OF FERRITIN: CPT

## 2022-09-02 PROCEDURE — 84460 ALANINE AMINO (ALT) (SGPT): CPT

## 2022-09-02 PROCEDURE — 82103 ALPHA-1-ANTITRYPSIN TOTAL: CPT

## 2022-09-02 PROCEDURE — 86038 ANTINUCLEAR ANTIBODIES: CPT

## 2022-09-02 PROCEDURE — 86015 ACTIN ANTIBODY EACH: CPT

## 2022-09-02 PROCEDURE — 82947 ASSAY GLUCOSE BLOOD QUANT: CPT

## 2022-09-02 PROCEDURE — 86803 HEPATITIS C AB TEST: CPT

## 2022-09-02 PROCEDURE — 84478 ASSAY OF TRIGLYCERIDES: CPT

## 2022-09-02 PROCEDURE — 80076 HEPATIC FUNCTION PANEL: CPT

## 2022-09-02 PROCEDURE — 87340 HEPATITIS B SURFACE AG IA: CPT

## 2022-09-02 PROCEDURE — 83550 IRON BINDING TEST: CPT

## 2022-09-02 PROCEDURE — 82390 ASSAY OF CERULOPLASMIN: CPT

## 2022-09-02 PROCEDURE — 85610 PROTHROMBIN TIME: CPT

## 2022-09-02 PROCEDURE — 86704 HEP B CORE ANTIBODY TOTAL: CPT

## 2022-09-02 PROCEDURE — 83883 ASSAY NEPHELOMETRY NOT SPEC: CPT

## 2022-09-02 PROCEDURE — 83010 ASSAY OF HAPTOGLOBIN QUANT: CPT

## 2022-09-02 PROCEDURE — 82977 ASSAY OF GGT: CPT

## 2022-09-02 PROCEDURE — 82172 ASSAY OF APOLIPOPROTEIN: CPT

## 2022-09-02 PROCEDURE — 86381 MITOCHONDRIAL ANTIBODY EACH: CPT

## 2022-09-02 PROCEDURE — 83540 ASSAY OF IRON: CPT

## 2022-09-02 PROCEDURE — 84450 TRANSFERASE (AST) (SGOT): CPT

## 2022-09-02 PROCEDURE — 82247 BILIRUBIN TOTAL: CPT

## 2022-09-02 PROCEDURE — 82465 ASSAY BLD/SERUM CHOLESTEROL: CPT

## 2022-09-02 PROCEDURE — 36415 COLL VENOUS BLD VENIPUNCTURE: CPT

## 2022-09-03 LAB
A1AT SERPL-MCNC: 132 MG/DL (ref 101–187)
ACTIN IGG SERPL-ACNC: 9 UNITS (ref 0–19)
CERULOPLASMIN SERPL-MCNC: 26.1 MG/DL (ref 19–39)
MITOCHONDRIA M2 IGG SER-ACNC: <20 UNITS (ref 0–20)

## 2022-09-06 LAB
A2 MACROGLOB SERPL-MCNC: 221 MG/DL (ref 110–276)
ALT SERPL W P-5'-P-CCNC: 35 IU/L (ref 0–40)
ANA HOMOGEN SER QL IF: NORMAL
ANA HOMOGEN TITR SER: NORMAL {TITER}
APO A-I SERPL-MCNC: 135 MG/DL (ref 116–209)
AST SERPL W P-5'-P-CCNC: 30 IU/L (ref 0–40)
BILIRUB SERPL-MCNC: 0.3 MG/DL (ref 0–1.2)
CHOLEST SERPL-MCNC: 190 MG/DL (ref 100–199)
FIBROSIS SCORING:: ABNORMAL
FIBROSIS STAGE SERPL QL: ABNORMAL
GGT SERPL-CCNC: 29 IU/L (ref 0–60)
GLUCOSE SERPL-MCNC: 111 MG/DL (ref 65–99)
HAPTOGLOB SERPL-MCNC: 149 MG/DL (ref 33–346)
LABORATORY COMMENT REPORT: ABNORMAL
LIVER FIBR SCORE SERPL CALC.FIBROSURE: 0.18 (ref 0–0.21)
NECROINFLAMMATORY ACT GRADE SERPL QL: ABNORMAL
NECROINFLAMMATORY ACT SCORE SERPL: 0.5
RYE IGE QN: POSITIVE
SERVICE CMNT-IMP: ABNORMAL
SL AMB INTERPRETATION: ABNORMAL
SL AMB NASH SCORING: ABNORMAL
SL AMB STEATOSIS GRADE: ABNORMAL
SL AMB STEATOSIS SCORE: 0.61 (ref 0–0.3)
STEATOSIS GRADING: ABNORMAL
TRIGL SERPL-MCNC: 94 MG/DL (ref 0–149)

## 2022-09-09 ENCOUNTER — APPOINTMENT (OUTPATIENT)
Dept: RADIOLOGY | Facility: HOSPITAL | Age: 57
DRG: 134 | End: 2022-09-09
Payer: COMMERCIAL

## 2022-09-09 ENCOUNTER — APPOINTMENT (EMERGENCY)
Dept: CT IMAGING | Facility: HOSPITAL | Age: 57
DRG: 134 | End: 2022-09-09
Payer: COMMERCIAL

## 2022-09-09 ENCOUNTER — HOSPITAL ENCOUNTER (INPATIENT)
Facility: HOSPITAL | Age: 57
LOS: 2 days | Discharge: HOME | DRG: 134 | End: 2022-09-12
Attending: EMERGENCY MEDICINE | Admitting: INTERNAL MEDICINE
Payer: COMMERCIAL

## 2022-09-09 DIAGNOSIS — R06.02 SOB (SHORTNESS OF BREATH): ICD-10-CM

## 2022-09-09 DIAGNOSIS — R07.9 CHEST PAIN: Primary | ICD-10-CM

## 2022-09-09 DIAGNOSIS — I26.99 PULMONARY EMBOLISM AND INFARCTION (HCC): ICD-10-CM

## 2022-09-09 DIAGNOSIS — I26.94 MULTIPLE SUBSEGMENTAL PULMONARY EMBOLI WITHOUT ACUTE COR PULMONALE (HCC): ICD-10-CM

## 2022-09-09 DIAGNOSIS — R74.01 TRANSAMINITIS: ICD-10-CM

## 2022-09-09 LAB
2HR DELTA HS TROPONIN: 0 NG/L
ALBUMIN SERPL BCP-MCNC: 4.2 G/DL (ref 3.5–5)
ALP SERPL-CCNC: 68 U/L (ref 34–104)
ALT SERPL W P-5'-P-CCNC: 22 U/L (ref 7–52)
ANION GAP SERPL CALCULATED.3IONS-SCNC: 7 MMOL/L (ref 4–13)
APTT PPP: 24 SECONDS (ref 23–37)
AST SERPL W P-5'-P-CCNC: 19 U/L (ref 13–39)
BASOPHILS # BLD AUTO: 0.06 THOUSANDS/ΜL (ref 0–0.1)
BASOPHILS NFR BLD AUTO: 1 % (ref 0–1)
BILIRUB SERPL-MCNC: 0.58 MG/DL (ref 0.2–1)
BNP SERPL-MCNC: 20 PG/ML (ref 0–100)
BUN SERPL-MCNC: 13 MG/DL (ref 5–25)
CALCIUM SERPL-MCNC: 9.3 MG/DL (ref 8.4–10.2)
CARDIAC TROPONIN I PNL SERPL HS: 2 NG/L
CARDIAC TROPONIN I PNL SERPL HS: 2 NG/L
CHLORIDE SERPL-SCNC: 103 MMOL/L (ref 96–108)
CO2 SERPL-SCNC: 25 MMOL/L (ref 21–32)
CREAT SERPL-MCNC: 0.91 MG/DL (ref 0.6–1.3)
D DIMER PPP FEU-MCNC: 1.82 UG/ML FEU
EOSINOPHIL # BLD AUTO: 0.15 THOUSAND/ΜL (ref 0–0.61)
EOSINOPHIL NFR BLD AUTO: 2 % (ref 0–6)
ERYTHROCYTE [DISTWIDTH] IN BLOOD BY AUTOMATED COUNT: 12.7 % (ref 11.6–15.1)
GFR SERPL CREATININE-BSD FRML MDRD: 70 ML/MIN/1.73SQ M
GLUCOSE SERPL-MCNC: 101 MG/DL (ref 65–140)
HCT VFR BLD AUTO: 41.1 % (ref 34.8–46.1)
HGB BLD-MCNC: 14.2 G/DL (ref 11.5–15.4)
IMM GRANULOCYTES # BLD AUTO: 0.03 THOUSAND/UL (ref 0–0.2)
IMM GRANULOCYTES NFR BLD AUTO: 0 % (ref 0–2)
INR PPP: 1.03 (ref 0.84–1.19)
LIPASE SERPL-CCNC: 20 U/L (ref 11–82)
LYMPHOCYTES # BLD AUTO: 1.94 THOUSANDS/ΜL (ref 0.6–4.47)
LYMPHOCYTES NFR BLD AUTO: 19 % (ref 14–44)
MAGNESIUM SERPL-MCNC: 1.9 MG/DL (ref 1.9–2.7)
MCH RBC QN AUTO: 31.2 PG (ref 26.8–34.3)
MCHC RBC AUTO-ENTMCNC: 34.5 G/DL (ref 31.4–37.4)
MCV RBC AUTO: 90 FL (ref 82–98)
MONOCYTES # BLD AUTO: 0.9 THOUSAND/ΜL (ref 0.17–1.22)
MONOCYTES NFR BLD AUTO: 9 % (ref 4–12)
NEUTROPHILS # BLD AUTO: 7.11 THOUSANDS/ΜL (ref 1.85–7.62)
NEUTS SEG NFR BLD AUTO: 69 % (ref 43–75)
NRBC BLD AUTO-RTO: 0 /100 WBCS
PLATELET # BLD AUTO: 219 THOUSANDS/UL (ref 149–390)
PMV BLD AUTO: 10.4 FL (ref 8.9–12.7)
POTASSIUM SERPL-SCNC: 3.7 MMOL/L (ref 3.5–5.3)
PROT SERPL-MCNC: 7.4 G/DL (ref 6.4–8.4)
PROTHROMBIN TIME: 13.8 SECONDS (ref 11.6–14.5)
RBC # BLD AUTO: 4.55 MILLION/UL (ref 3.81–5.12)
SODIUM SERPL-SCNC: 135 MMOL/L (ref 135–147)
WBC # BLD AUTO: 10.19 THOUSAND/UL (ref 4.31–10.16)

## 2022-09-09 PROCEDURE — 84484 ASSAY OF TROPONIN QUANT: CPT | Performed by: STUDENT IN AN ORGANIZED HEALTH CARE EDUCATION/TRAINING PROGRAM

## 2022-09-09 PROCEDURE — 85730 THROMBOPLASTIN TIME PARTIAL: CPT | Performed by: STUDENT IN AN ORGANIZED HEALTH CARE EDUCATION/TRAINING PROGRAM

## 2022-09-09 PROCEDURE — 85025 COMPLETE CBC W/AUTO DIFF WBC: CPT | Performed by: STUDENT IN AN ORGANIZED HEALTH CARE EDUCATION/TRAINING PROGRAM

## 2022-09-09 PROCEDURE — 36415 COLL VENOUS BLD VENIPUNCTURE: CPT | Performed by: STUDENT IN AN ORGANIZED HEALTH CARE EDUCATION/TRAINING PROGRAM

## 2022-09-09 PROCEDURE — 71045 X-RAY EXAM CHEST 1 VIEW: CPT

## 2022-09-09 PROCEDURE — 85379 FIBRIN DEGRADATION QUANT: CPT | Performed by: STUDENT IN AN ORGANIZED HEALTH CARE EDUCATION/TRAINING PROGRAM

## 2022-09-09 PROCEDURE — 80053 COMPREHEN METABOLIC PANEL: CPT | Performed by: STUDENT IN AN ORGANIZED HEALTH CARE EDUCATION/TRAINING PROGRAM

## 2022-09-09 PROCEDURE — 83735 ASSAY OF MAGNESIUM: CPT | Performed by: STUDENT IN AN ORGANIZED HEALTH CARE EDUCATION/TRAINING PROGRAM

## 2022-09-09 PROCEDURE — G1004 CDSM NDSC: HCPCS

## 2022-09-09 PROCEDURE — 99285 EMERGENCY DEPT VISIT HI MDM: CPT | Performed by: STUDENT IN AN ORGANIZED HEALTH CARE EDUCATION/TRAINING PROGRAM

## 2022-09-09 PROCEDURE — 83880 ASSAY OF NATRIURETIC PEPTIDE: CPT | Performed by: STUDENT IN AN ORGANIZED HEALTH CARE EDUCATION/TRAINING PROGRAM

## 2022-09-09 PROCEDURE — 99285 EMERGENCY DEPT VISIT HI MDM: CPT

## 2022-09-09 PROCEDURE — 71275 CT ANGIOGRAPHY CHEST: CPT

## 2022-09-09 PROCEDURE — 83690 ASSAY OF LIPASE: CPT | Performed by: STUDENT IN AN ORGANIZED HEALTH CARE EDUCATION/TRAINING PROGRAM

## 2022-09-09 PROCEDURE — 85610 PROTHROMBIN TIME: CPT | Performed by: STUDENT IN AN ORGANIZED HEALTH CARE EDUCATION/TRAINING PROGRAM

## 2022-09-09 PROCEDURE — 74177 CT ABD & PELVIS W/CONTRAST: CPT

## 2022-09-09 PROCEDURE — 96374 THER/PROPH/DIAG INJ IV PUSH: CPT

## 2022-09-09 PROCEDURE — 93005 ELECTROCARDIOGRAM TRACING: CPT

## 2022-09-09 RX ORDER — KETOROLAC TROMETHAMINE 30 MG/ML
15 INJECTION, SOLUTION INTRAMUSCULAR; INTRAVENOUS ONCE
Status: COMPLETED | OUTPATIENT
Start: 2022-09-09 | End: 2022-09-09

## 2022-09-09 RX ADMIN — KETOROLAC TROMETHAMINE 15 MG: 30 INJECTION, SOLUTION INTRAMUSCULAR at 22:22

## 2022-09-09 RX ADMIN — IOHEXOL 100 ML: 350 INJECTION, SOLUTION INTRAVENOUS at 22:42

## 2022-09-10 PROBLEM — I26.94 MULTIPLE SUBSEGMENTAL PULMONARY EMBOLI WITHOUT ACUTE COR PULMONALE (HCC): Status: ACTIVE | Noted: 2022-09-10

## 2022-09-10 PROBLEM — I26.99 PULMONARY EMBOLUS (HCC): Status: ACTIVE | Noted: 2022-09-10

## 2022-09-10 LAB
ANION GAP SERPL CALCULATED.3IONS-SCNC: 8 MMOL/L (ref 4–13)
BUN SERPL-MCNC: 13 MG/DL (ref 5–25)
CALCIUM SERPL-MCNC: 9.1 MG/DL (ref 8.4–10.2)
CHLORIDE SERPL-SCNC: 104 MMOL/L (ref 96–108)
CO2 SERPL-SCNC: 26 MMOL/L (ref 21–32)
CREAT SERPL-MCNC: 0.88 MG/DL (ref 0.6–1.3)
GFR SERPL CREATININE-BSD FRML MDRD: 73 ML/MIN/1.73SQ M
GLUCOSE SERPL-MCNC: 73 MG/DL (ref 65–140)
POTASSIUM SERPL-SCNC: 3.5 MMOL/L (ref 3.5–5.3)
SODIUM SERPL-SCNC: 138 MMOL/L (ref 135–147)

## 2022-09-10 PROCEDURE — 85305 CLOT INHIBIT PROT S TOTAL: CPT

## 2022-09-10 PROCEDURE — 81240 F2 GENE: CPT

## 2022-09-10 PROCEDURE — 80048 BASIC METABOLIC PNL TOTAL CA: CPT

## 2022-09-10 PROCEDURE — 85306 CLOT INHIBIT PROT S FREE: CPT

## 2022-09-10 PROCEDURE — 85705 THROMBOPLASTIN INHIBITION: CPT

## 2022-09-10 PROCEDURE — 85613 RUSSELL VIPER VENOM DILUTED: CPT

## 2022-09-10 PROCEDURE — 96372 THER/PROPH/DIAG INJ SC/IM: CPT

## 2022-09-10 PROCEDURE — 85670 THROMBIN TIME PLASMA: CPT

## 2022-09-10 PROCEDURE — 86147 CARDIOLIPIN ANTIBODY EA IG: CPT

## 2022-09-10 PROCEDURE — 81241 F5 GENE: CPT

## 2022-09-10 PROCEDURE — 85303 CLOT INHIBIT PROT C ACTIVITY: CPT

## 2022-09-10 PROCEDURE — 99222 1ST HOSP IP/OBS MODERATE 55: CPT | Performed by: INTERNAL MEDICINE

## 2022-09-10 PROCEDURE — 86146 BETA-2 GLYCOPROTEIN ANTIBODY: CPT

## 2022-09-10 PROCEDURE — 85732 THROMBOPLASTIN TIME PARTIAL: CPT

## 2022-09-10 PROCEDURE — 85300 ANTITHROMBIN III ACTIVITY: CPT

## 2022-09-10 RX ORDER — KETOROLAC TROMETHAMINE 30 MG/ML
30 INJECTION, SOLUTION INTRAMUSCULAR; INTRAVENOUS ONCE
Status: COMPLETED | OUTPATIENT
Start: 2022-09-10 | End: 2022-09-10

## 2022-09-10 RX ORDER — PANTOPRAZOLE SODIUM 40 MG/1
40 TABLET, DELAYED RELEASE ORAL
Status: DISCONTINUED | OUTPATIENT
Start: 2022-09-10 | End: 2022-09-12 | Stop reason: HOSPADM

## 2022-09-10 RX ORDER — ACETAMINOPHEN 325 MG/1
650 TABLET ORAL EVERY 6 HOURS PRN
Status: DISCONTINUED | OUTPATIENT
Start: 2022-09-10 | End: 2022-09-12 | Stop reason: HOSPADM

## 2022-09-10 RX ORDER — ENOXAPARIN SODIUM 300 MG/3ML
1 INJECTION INTRAVENOUS; SUBCUTANEOUS
Status: DISCONTINUED | OUTPATIENT
Start: 2022-09-11 | End: 2022-09-10 | Stop reason: SDUPTHER

## 2022-09-10 RX ORDER — ALBUTEROL SULFATE 2.5 MG/3ML
2.5 SOLUTION RESPIRATORY (INHALATION) EVERY 6 HOURS PRN
Status: DISCONTINUED | OUTPATIENT
Start: 2022-09-10 | End: 2022-09-12 | Stop reason: HOSPADM

## 2022-09-10 RX ORDER — DULOXETIN HYDROCHLORIDE 60 MG/1
60 CAPSULE, DELAYED RELEASE ORAL DAILY
Status: DISCONTINUED | OUTPATIENT
Start: 2022-09-10 | End: 2022-09-12 | Stop reason: HOSPADM

## 2022-09-10 RX ORDER — ENOXAPARIN SODIUM 300 MG/3ML
1 INJECTION INTRAVENOUS; SUBCUTANEOUS ONCE
Status: DISCONTINUED | OUTPATIENT
Start: 2022-09-10 | End: 2022-09-10 | Stop reason: CLARIF

## 2022-09-10 RX ORDER — ENOXAPARIN SODIUM 100 MG/ML
160 INJECTION SUBCUTANEOUS EVERY 24 HOURS
Status: DISCONTINUED | OUTPATIENT
Start: 2022-09-10 | End: 2022-09-10

## 2022-09-10 RX ORDER — OXYCODONE HYDROCHLORIDE 5 MG/1
5 TABLET ORAL EVERY 4 HOURS PRN
Status: DISCONTINUED | OUTPATIENT
Start: 2022-09-10 | End: 2022-09-12 | Stop reason: HOSPADM

## 2022-09-10 RX ORDER — LEVOTHYROXINE SODIUM 0.05 MG/1
50 TABLET ORAL
Status: DISCONTINUED | OUTPATIENT
Start: 2022-09-10 | End: 2022-09-12 | Stop reason: HOSPADM

## 2022-09-10 RX ORDER — BUPROPION HYDROCHLORIDE 100 MG/1
50 TABLET ORAL 2 TIMES DAILY
Status: DISCONTINUED | OUTPATIENT
Start: 2022-09-10 | End: 2022-09-12 | Stop reason: HOSPADM

## 2022-09-10 RX ORDER — ENOXAPARIN SODIUM 100 MG/ML
160 INJECTION SUBCUTANEOUS ONCE
Status: COMPLETED | OUTPATIENT
Start: 2022-09-10 | End: 2022-09-10

## 2022-09-10 RX ORDER — LIDOCAINE 50 MG/G
1 PATCH TOPICAL DAILY
Status: DISCONTINUED | OUTPATIENT
Start: 2022-09-10 | End: 2022-09-12 | Stop reason: HOSPADM

## 2022-09-10 RX ORDER — ONDANSETRON 2 MG/ML
4 INJECTION INTRAMUSCULAR; INTRAVENOUS EVERY 6 HOURS PRN
Status: DISCONTINUED | OUTPATIENT
Start: 2022-09-10 | End: 2022-09-12 | Stop reason: HOSPADM

## 2022-09-10 RX ORDER — NYSTATIN 100000 [USP'U]/G
POWDER TOPICAL 2 TIMES DAILY
Status: DISCONTINUED | OUTPATIENT
Start: 2022-09-10 | End: 2022-09-12 | Stop reason: HOSPADM

## 2022-09-10 RX ADMIN — DULOXETINE HYDROCHLORIDE 60 MG: 60 CAPSULE, DELAYED RELEASE ORAL at 11:18

## 2022-09-10 RX ADMIN — ENOXAPARIN SODIUM 160 MG: 80 INJECTION SUBCUTANEOUS at 00:13

## 2022-09-10 RX ADMIN — OXYCODONE HYDROCHLORIDE 5 MG: 5 TABLET ORAL at 22:19

## 2022-09-10 RX ADMIN — APIXABAN 10 MG: 5 TABLET, FILM COATED ORAL at 17:39

## 2022-09-10 RX ADMIN — PANTOPRAZOLE SODIUM 40 MG: 40 TABLET, DELAYED RELEASE ORAL at 06:12

## 2022-09-10 RX ADMIN — OXYCODONE HYDROCHLORIDE 5 MG: 5 TABLET ORAL at 15:17

## 2022-09-10 RX ADMIN — NYSTATIN: 100000 POWDER TOPICAL at 11:16

## 2022-09-10 RX ADMIN — APIXABAN 10 MG: 5 TABLET, FILM COATED ORAL at 11:18

## 2022-09-10 RX ADMIN — BUPROPION HYDROCHLORIDE 50 MG: 100 TABLET, FILM COATED ORAL at 17:39

## 2022-09-10 RX ADMIN — NYSTATIN: 100000 POWDER TOPICAL at 17:47

## 2022-09-10 RX ADMIN — LEVOTHYROXINE SODIUM 50 MCG: 125 TABLET ORAL at 06:12

## 2022-09-10 RX ADMIN — KETOROLAC TROMETHAMINE 30 MG: 30 INJECTION, SOLUTION INTRAMUSCULAR at 02:32

## 2022-09-10 RX ADMIN — BUPROPION HYDROCHLORIDE 50 MG: 100 TABLET, FILM COATED ORAL at 11:18

## 2022-09-10 NOTE — ED PROVIDER NOTES
History  Chief Complaint   Patient presents with    Chest Pain     Patient reports chest pain and SOB since yesterday  79-year-old female presents emergency department today with concern for right upper quadrant abdominal pain, right lower chest/rib pain x1 day  Patient states over the past states she has been increasing pain has been progressively worsening until today  Describes as a sharp sensation that is worse with breathing and with coughing or laughing, radiated into her right shoulder arm and neck  States shortness of breath as result of pain but states it feels different than her typical asthma/COPD  Admits to pain in her chest but states in the right lower portion of her chest, some nausea but no vomiting  Does admit to several episodes of diarrhea 2-3 days ago but has since resolved prior to the onset of pain, no constipation  No lightheadedness or dizziness, no numbness or tingling extremities, no weakness, no dysuria, increased frequency urgency  Denies prior history of blood clot, no complaint of pain or swelling in the lower legs  Prior to Admission Medications   Prescriptions Last Dose Informant Patient Reported? Taking?    DULoxetine (CYMBALTA) 20 mg capsule  Self Yes No   Sig: Take 40 mg by mouth daily   Patient not taking: No sig reported   DULoxetine (CYMBALTA) 60 mg delayed release capsule  Self Yes No   Sig: Take 60 mg by mouth daily   DULoxetine HCl 40 MG CPEP  Self No No   Sig: Take 1 capsule (40 mg total) by mouth daily   Patient not taking: No sig reported   Diclofenac Sodium (VOLTAREN) 1 %  Self No No   Sig: Apply 2 g topically 4 (four) times a day   Patient not taking: No sig reported   Ibuprofen 200 MG CAPS  Self Yes No   Sig: Take by mouth   Patient not taking: No sig reported   albuterol (2 5 mg/3 mL) 0 083 % nebulizer solution   Yes No   Sig: Inhale 1 each   Patient not taking: No sig reported   albuterol (PROVENTIL HFA,VENTOLIN HFA) 90 mcg/act inhaler   No No Sig: Inhale 2 puffs every 6 (six) hours as needed for wheezing or shortness of breath   Patient not taking: No sig reported   benzonatate (TESSALON PERLES) 100 mg capsule  Self No No   Sig: Take 1 capsule (100 mg total) by mouth 3 (three) times a day as needed for cough   Patient not taking: No sig reported   budesonide (Pulmicort Flexhaler) 180 MCG/ACT inhaler   No No   Sig: Inhale 2 puffs  in the morning and 2 puffs in the evening  Do all this for 10 days  Rinse mouth after use      ipratropium-albuterol (Combivent Respimat) inhaler  Self Yes No   Sig: Inhale   Patient not taking: No sig reported   levothyroxine 50 mcg tablet  Self No No   Sig: Take 1 tablet (50 mcg total) by mouth daily in the early morning   omeprazole (PriLOSEC) 20 mg delayed release capsule  Self Yes No   Sig: Take 20 mg by mouth daily   traMADol (ULTRAM) 50 mg tablet  Self No No   Sig: Take 1 tablet (50 mg total) by mouth every 6 (six) hours as needed for moderate pain   Patient not taking: No sig reported      Facility-Administered Medications: None       Past Medical History:   Diagnosis Date    Anxiety     Asthma     Chronic fatigue     COPD [facility]chronic obstructive pulmonary disease) [facility])     Depression     Fibromyalgia     Head injury     Hypothyroid     Morbid obesity with BMI of 60 0-69 9, adult ([facility])     Sleep apnea     PT states a very mild case, prescribed CPAP but does not use machine         Past Surgical History:   Procedure Laterality Date    APPENDECTOMY       SECTION      ESOPHAGOGASTRODUODENOSCOPY      HERNIA REPAIR      Primary repair of incarcerated umbilical hernia - Dr Gregory Greene      Hysterectomy/revise vagina    KNEE SURGERY      Arthrotomy with open meniscus repair     LAPAROSCOPIC CHOLECYSTECTOMY      TONSILECTOMY AND ADNOIDECTOMY      TUMOR REMOVAL      head    URETER SURGERY      repair    WISDOM TOOTH EXTRACTION         Family History   Problem Relation Age of Onset    Diabetes Family     Heart disease Family     Depression Mother     Anxiety disorder Mother     No Known Problems Father     No Known Problems Sister     Diabetes Brother     Hypertension Brother     No Known Problems Son     Breast cancer Paternal Grandmother     Esophageal cancer Paternal Grandfather     Skin cancer Paternal Grandfather     No Known Problems Maternal Grandmother     No Known Problems Maternal Grandfather     Stroke Neg Hx     Thyroid disease Neg Hx      I have reviewed and agree with the history as documented  E-Cigarette/Vaping    E-Cigarette Use Never User      E-Cigarette/Vaping Substances    Nicotine No     THC No     CBD No     Flavoring No     Other No     Unknown No      Social History     Tobacco Use    Smoking status: Former Smoker     Years: 23 50     Types: Cigarettes    Smokeless tobacco: Never Used    Tobacco comment: 3= PPD - As per [employer] Use    Vaping Use: Never used   Substance Use Topics    Alcohol use: Not Currently     Comment: rarely    Drug use: Not Currently       Review of Systems   Constitutional: Negative for chills and fever  HENT: Negative  Negative for ear pain and sore throat  Eyes: Negative for pain and visual disturbance  Respiratory: Positive for shortness of breath  Negative for cough  Cardiovascular: Positive for chest pain  Negative for palpitations  Gastrointestinal: Positive for abdominal pain and nausea  Negative for constipation, diarrhea and vomiting  Genitourinary: Negative for dysuria, hematuria and urgency  Musculoskeletal: Positive for arthralgias (R arm/shoulder) and neck pain  Negative for back pain  Skin: Negative for color change and rash  Neurological: Negative for dizziness, weakness, light-headedness, numbness and headaches  All other systems reviewed and are negative  Physical Exam  Physical Exam  Vitals and nursing note reviewed     Constitutional:       General: She is in acute distress  Appearance: She is well-developed  She is not ill-appearing  HENT:      Head: Normocephalic and atraumatic  Mouth/Throat:      Mouth: Mucous membranes are moist       Pharynx: Oropharynx is clear  Eyes:      Conjunctiva/sclera: Conjunctivae normal    Cardiovascular:      Rate and Rhythm: Normal rate and regular rhythm  Heart sounds: Normal heart sounds  No murmur heard  Pulmonary:      Effort: Pulmonary effort is normal  No respiratory distress  Breath sounds: Normal breath sounds  Chest:      Chest wall: Tenderness present  Abdominal:      General: Bowel sounds are normal       Palpations: Abdomen is soft  Tenderness: There is abdominal tenderness in the right upper quadrant  There is no right CVA tenderness or left CVA tenderness  Musculoskeletal:      Cervical back: Neck supple  Right lower leg: Tenderness present  No edema  Left lower leg: No tenderness  No edema  Skin:     General: Skin is warm and dry  Neurological:      General: No focal deficit present  Mental Status: She is alert and oriented to person, place, and time  Sensory: No sensory deficit  Motor: No weakness  Psychiatric:         Mood and Affect: Mood normal          Behavior: Behavior normal          Thought Content:  Thought content normal          Judgment: Judgment normal          Vital Signs  ED Triage Vitals   Temperature Pulse Respirations Blood Pressure SpO2   09/09/22 2054 09/09/22 2048 09/09/22 2048 09/09/22 2048 09/09/22 2048   97 9 °F (36 6 °C) 86 20 138/75 97 %      Temp Source Heart Rate Source Patient Position - Orthostatic VS BP Location FiO2 (%)   09/09/22 2054 09/09/22 2048 09/09/22 2048 09/09/22 2048 --   Oral Monitor Lying Right arm       Pain Score       09/09/22 2048       6           Vitals:    09/09/22 2048 09/09/22 2212 09/10/22 0017   BP: 138/75 135/63 137/63   Pulse: 86 76 80   Patient Position - Orthostatic VS: Lying Sitting Lying Visual Acuity      ED Medications  Medications   ketorolac (TORADOL) injection 15 mg (15 mg Intravenous Given 9/9/22 2222)   iohexol (OMNIPAQUE) 350 MG/ML injection (SINGLE-DOSE) 100 mL (100 mL Intravenous Given 9/9/22 2242)   enoxaparin (LOVENOX) subcutaneous injection 160 mg (160 mg Subcutaneous Given 9/10/22 0013)       Diagnostic Studies  Results Reviewed     Procedure Component Value Units Date/Time    Beta-2 glycoprotein antibodies [[id number]]     Lab Status: No result Specimen: Blood     Antithrombin III Activity [[id number]]     Lab Status: No result Specimen: Blood     Cardiolipin antibody [[id number]]     Lab Status: No result Specimen: Blood     Factor 5 leiden [[id number]]     Lab Status: No result Specimen: Blood     Lupus anticoagulant [[id number]]     Lab Status: No result Specimen: Blood     Protein C activity [[id number]]     Lab Status: No result Specimen: Blood     Protein S activity [[id number]]     Lab Status: No result Specimen: Blood     Protein S Antigen, Total & Free [[id number]]     Lab Status: No result Specimen: Blood     Prothrombin gene mutation [[id number]]     Lab Status: No result Specimen: Blood     HS Troponin I 2hr [[id number]]  (Normal) Collected: 09/09/22 2252    Lab Status: Final result Specimen: Blood from Arm, Left Updated: 09/09/22 2322     hs TnI 2hr 2 ng/L      Delta 2hr hsTnI 0 ng/L     B-Type Natriuretic Peptide(BNP) AN, CA, EA Campuses Only [[id number]]  (Normal) Collected: 09/09/22 2108    Lab Status: Final result Specimen: Blood from Arm, Left Updated: 09/09/22 2141     BNP 20 pg/mL     HS Troponin 0hr (reflex protocol) [[id number]]  (Normal) Collected: 09/09/22 2108    Lab Status: Final result Specimen: Blood from Arm, Left Updated: 09/09/22 2137     hs TnI 0hr 2 ng/L     Comprehensive metabolic panel [[id number]] Collected: 09/09/22 2108    Lab Status: Final result Specimen: Blood from Arm, Left Updated: 09/09/22 2135     Sodium 135 mmol/L      Potassium 3 7 mmol/L Chloride 103 mmol/L      CO2 25 mmol/L      ANION GAP 7 mmol/L      BUN 13 mg/dL      Creatinine 0 91 mg/dL      Glucose 101 mg/dL      Calcium 9 3 mg/dL      AST 19 U/L      ALT 22 U/L      Alkaline Phosphatase 68 U/L      Total Protein 7 4 g/dL      Albumin 4 2 g/dL      Total Bilirubin 0 58 mg/dL      eGFR 70 ml/min/1 73sq m     Narrative:      [place] guidelines for Chronic Kidney Disease (CKD):     Stage 1 with normal or high GFR (GFR > 90 mL/min/1 73 square meters)    Stage 2 Mild CKD (GFR = 60-89 mL/min/1 73 square meters)    Stage 3A Moderate CKD (GFR = 45-59 mL/min/1 73 square meters)    Stage 3B Moderate CKD (GFR = 30-44 mL/min/1 73 square meters)    Stage 4 Severe CKD (GFR = 15-29 mL/min/1 73 square meters)    Stage 5 End Stage CKD (GFR <15 mL/min/1 73 square meters)  Note: GFR calculation is accurate only with a steady state creatinine    Magnesium [[id number]]  (Normal) Collected: 09/09/22 2108    Lab Status: Final result Specimen: Blood from Arm, Left Updated: 09/09/22 2135     Magnesium 1 9 mg/dL     Lipase [[id number]]  (Normal) Collected: 09/09/22 2108    Lab Status: Final result Specimen: Blood from Arm, Left Updated: 09/09/22 2135     Lipase 20 u/L     D-Dimer [[id number]]  (Abnormal) Collected: 09/09/22 2108    Lab Status: Final result Specimen: Blood from Arm, Left Updated: 09/09/22 2133     D-Dimer, Quant 1 82 ug/ml FEU     Narrative: In the evaluation for possible pulmonary embolism, in the appropriate (Well's Score of 4 or less) patient, the age adjusted d-dimer cutoff for this patient can be calculated as:    Age x 0 01 (in ug/mL) for Age-adjusted D-dimer exclusion threshold for a patient over 50 years      Fernando Ying [[id number]]  (Normal) Collected: 09/09/22 2108    Lab Status: Final result Specimen: Blood from Arm, Left Updated: 09/09/22 2123     Protime 13 8 seconds      INR 1 03    APTT [[id number]]  (Normal) Collected: 09/09/22 2108    Lab Status: Final result Specimen: Blood from Arm, Left Updated: 09/09/22 2123     PTT 24 seconds     CBC and differential [[id number]]  (Abnormal) Collected: 09/09/22 2108    Lab Status: Final result Specimen: Blood from Arm, Left Updated: 09/09/22 2114     WBC 10 19 Thousand/uL      RBC 4 55 Million/uL      Hemoglobin 14 2 g/dL      Hematocrit 41 1 %      MCV 90 fL      MCH 31 2 pg      MCHC 34 5 g/dL      RDW 12 7 %      MPV 10 4 fL      Platelets 816 Thousands/uL      nRBC 0 /100 WBCs      Neutrophils Relative 69 %      Immat GRANS % 0 %      Lymphocytes Relative 19 %      Monocytes Relative 9 %      Eosinophils Relative 2 %      Basophils Relative 1 %      Neutrophils Absolute 7 11 Thousands/µL      Immature Grans Absolute 0 03 Thousand/uL      Lymphocytes Absolute 1 94 Thousands/µL      Monocytes Absolute 0 90 Thousand/µL      Eosinophils Absolute 0 15 Thousand/µL      Basophils Absolute 0 06 Thousands/µL     UA w Reflex to Microscopic w Reflex to Culture [[id number]]     Lab Status: No result Specimen: Urine                  PE Study with CT Abdomen and Pelvis with contrast   Final Result by Waqar Mcneil DO (09/09 2348)      Filling defect seen in the right lower lobe pulmonary arterial branch extending into several segmental and subsegmental branches (axial image 127, series 406), suspicious for pulmonary emboli  No central pulmonary embolism is seen  No discrete evidence    of right heart strain  Ill-defined opacity in the periphery of the right lower lobe, nonspecific but could represent developing pulmonary infarct  Small dependent right pleural effusion  Left renal cyst, and other findings as above           I personally discussed this study with [address] on 9/9/2022 at 11:48 PM                   Workstation performed: [id number]         XR chest 1 view portable   ED Interpretation by Gladys Gamez PA-C (09/09 2213)   No acute cardiopulmonary abnormalities Procedures  ECG 12 Lead Documentation Only    Date/Time: 9/10/2022 12:57 AM  Performed by: Francesca De Luna PA-C  Authorized by: Francesca De Luna PA-C     Indications / Diagnosis:  86  ECG reviewed by me, the ED Provider: yes    Patient location:  ED  Interpretation:     Interpretation: normal    Rate:     ECG rate:  86    ECG rate assessment: normal    Rhythm:     Rhythm: sinus rhythm    Ectopy:     Ectopy: none    QRS:     QRS axis:  Normal    QRS intervals:  Normal  Conduction:     Conduction: normal    T waves:     T waves: normal    Comments:      No signs of ischemia or block                 ED Course             HEART Risk Score    Flowsheet Row Most Recent Value   Heart Score Risk Calculator    History 0 Filed at: 09/09/2022 2323   ECG 0 Filed at: 09/09/2022 2323   Age 1 Filed at: 09/09/2022 2323   Risk Factors 1 Filed at: 09/09/2022 2323   Troponin 0 Filed at: 09/09/2022 2323   HEART Score 2 Filed at: 09/09/2022 2323                                      MDM  Number of Diagnoses or Management Options  Chest pain  Pulmonary embolism and infarction ([address] )  SOB (shortness of breath)  Diagnosis management comments: Patient is a 58-year-old female presents emergency department today concern for right-sided lower chest/right upper abdominal pain and shortness of breath  Based on patient's presentation main concern for pulmonary embolism versus ACS versus pneumothorax  Also considered abdominal pathologies including biliary obstruction, pancreatitis, bowel obstruction  Laboratory evaluation showed slight leukocytosis at 10 2 and an elevated D-dimer at 1 82  Remainder of laboratory evaluation was unremarkable and reassuring  CT PE study with abdomen pelvis was ordered with findings concerning for right-sided lower pulmonary embolism with concern for possible pulmonary infarct, no right heart strain    Additional findings of small right pleural effusion and left renal cyst   Was informed of findings by radiology Dr Lyman  EKG was normal sinus rhythm and chest film were unremarkable  Patient was started on 1 mg/kg of Lovenox  Case was this just with hospitalist, Camille GUERRA, who agreed to admit patient under care of Jacob Cain  Patient was informed of findings and plan of care which he verbalized understanding agreement  All patient's questions were answered, patient was stable at admission  Amount and/or Complexity of Data Reviewed  Clinical lab tests: ordered and reviewed  Tests in the radiology section of CPT®: ordered and reviewed  Independent visualization of images, tracings, or specimens: yes    Patient Progress  Patient progress: stable      Disposition  Final diagnoses:   Chest pain   SOB (shortness of breath)   Pulmonary embolism and infarction [facility])     Time reflects when diagnosis was documented in both MDM as applicable and the Disposition within this note     Time User Action Codes Description Comment    9/10/2022 12:16 AM Casi Sanchez [R07 9] Chest pain     9/10/2022 12:16 AM Casi Man Add [R06 02] SOB (shortness of breath)     9/10/2022 12:16 AM Casi Sanchez [I26 [id number]] Pulmonary embolism and infarction [facility])       ED Disposition     ED Disposition   Admit    Condition   Stable    Date/Time   Sat Sep 10, 2022 12:16 AM    Comment   Case was discussed with hospitalist CHARLIE Joe) and the patient's admission status was agreed to be Admission Status: inpatient status to the service of Dr Naeem Guillermo              Follow-up Information    None         Current Discharge Medication List      CONTINUE these medications which have NOT CHANGED    Details   albuterol (2 5 mg/3 mL) 0 083 % nebulizer solution Inhale 1 each      albuterol (PROVENTIL HFA,VENTOLIN HFA) 90 mcg/act inhaler Inhale 2 puffs every 6 (six) hours as needed for wheezing or shortness of breath  Qty: 2 Inhaler, Refills: 1    Comments: Substitution to a formulary equivalent within the same pharmaceutical class is authorized  Associated Diagnoses: Wheezing on inspiration      benzonatate (TESSALON PERLES) 100 mg capsule Take 1 capsule (100 mg total) by mouth 3 (three) times a day as needed for cough  Qty: 60 capsule, Refills: 1    Associated Diagnoses: COVID-19 long hauler manifesting chronic cough      budesonide (Pulmicort Flexhaler) 180 MCG/ACT inhaler Inhale 2 puffs  in the morning and 2 puffs in the evening  Do all this for 10 days  Rinse mouth after use  Marci Kelle: 1 each, Refills: 0    Associated Diagnoses: Pharyngitis, unspecified etiology; COVID-19      Diclofenac Sodium (VOLTAREN) 1 % Apply 2 g topically 4 (four) times a day  Qty: 100 g, Refills: 3    Associated Diagnoses: Other specified hypothyroidism; Fatigue, unspecified type      !! DULoxetine (CYMBALTA) 20 mg capsule Take 40 mg by mouth daily      !! DULoxetine (CYMBALTA) 60 mg delayed release capsule Take 60 mg by mouth daily      !! DULoxetine HCl 40 MG CPEP Take 1 capsule (40 mg total) by mouth daily  Qty: 90 capsule, Refills: 1    Associated Diagnoses: Current mild episode of major depressive disorder, unspecified whether recurrent (HCC)      Ibuprofen 200 MG CAPS Take by mouth      ipratropium-albuterol (Combivent Respimat) inhaler Inhale      levothyroxine 50 mcg tablet Take 1 tablet (50 mcg total) by mouth daily in the early morning  Qty: 90 tablet, Refills: 1    Associated Diagnoses: Thyromegaly; Other specified hypothyroidism      omeprazole (PriLOSEC) 20 mg delayed release capsule Take 20 mg by mouth daily      traMADol (ULTRAM) 50 mg tablet Take 1 tablet (50 mg total) by mouth every 6 (six) hours as needed for moderate pain  Qty: 120 tablet, Refills: 0    Associated Diagnoses: Low back pain with sciatica, sciatica laterality unspecified, unspecified back pain laterality, unspecified chronicity       ! ! - Potential duplicate medications found  Please discuss with provider  No discharge procedures on file      PDMP Review None          ED Provider  Electronically Signed by           Toby Rm PA-C  09/10/22 Jesus Park PA-C  09/10/22 [id number]

## 2022-09-10 NOTE — ASSESSMENT & PLAN NOTE
C/o of sharp, pleuritic chest pain and SOB  Denies hx of DVT/PE, recent travel/immbolization/surgery, known personal/family history of clotting disorders  Not active smoker, no exogenous estrogen usuage  · CTA: "filling defect in RLL pulmonary arterial branch extending in several segmental and subsegmental branches, suspicious for pulmonary emboli  No central pulmonary embolism seen  No discrete evidence of right heart strain  Ill-defined opacity in periphery of the right lower lobe, non-specific but could represent developing pulmonary infarct"  · Did have +TIA noted during workup of transaminitis- possible underlying lupus could be etiology? · Thrombosis panel ordered  · S/p therapeutic lovenox   Continue daily lovenox  · Given BMI will likely require Warfarin outpatient for maintenance treatment  · Obtain echo  · Monitor on tele   · Hemodynamically stable on RA - continue to monitor VS and respiratory status

## 2022-09-10 NOTE — PLAN OF CARE
Problem: MOBILITY - ADULT  Goal: Maintain or return to baseline ADL function  Description: INTERVENTIONS:  -  Assess patient's ability to carry out ADLs; assess patient's baseline for ADL function and identify physical deficits which impact ability to perform ADLs (bathing, care of mouth/teeth, toileting, grooming, dressing, etc )  - Assess/evaluate cause of self-care deficits   - Assess range of motion  - Assess patient's mobility; develop plan if impaired  - Assess patient's need for assistive devices and provide as appropriate  - Encourage maximum independence but intervene and supervise when necessary  - Involve family in performance of ADLs  - Assess for home care needs following discharge   - Consider OT consult to assist with ADL evaluation and planning for discharge  - Provide patient education as appropriate  Outcome: Progressing     Problem: CARDIOVASCULAR - ADULT  Goal: Maintains optimal cardiac output and hemodynamic stability  Description: INTERVENTIONS:  - Monitor I/O, vital signs and rhythm  - Monitor for S/S and trends of decreased cardiac output  - Administer and titrate ordered vasoactive medications to optimize hemodynamic stability  - Assess quality of pulses, skin color and temperature  - Assess for signs of decreased coronary artery perfusion  - Instruct patient to report change in severity of symptoms  Outcome: Progressing     Problem: RESPIRATORY - ADULT  Goal: Achieves optimal ventilation and oxygenation  Description: INTERVENTIONS:  - Assess for changes in respiratory status  - Assess for changes in mentation and behavior  - Position to facilitate oxygenation and minimize respiratory effort  - Oxygen administered by appropriate delivery if ordered  - Initiate smoking cessation education as indicated  - Encourage broncho-pulmonary hygiene including cough, deep breathe, Incentive Spirometry  - Assess the need for suctioning and aspirate as needed  - Assess and instruct to report SOB or any respiratory difficulty  - Respiratory Therapy support as indicated  Outcome: Progressing

## 2022-09-10 NOTE — H&P
Bulmar[address] 70 1965, 61 y o  female MRN: [id number]  Unit/Bed#: -01 Encounter: [id number]  Primary Care Provider: APPLE Hirsch   Date and time admitted to hospital: 9/9/2022  8:45 PM    * Pulmonary embolus [facility])  Assessment & Plan  C/o of sharp, pleuritic chest pain and SOB  Denies hx of DVT/PE, recent travel/immbolization/surgery, known personal/family history of clotting disorders  Not active smoker, no exogenous estrogen usuage  · CTA: "filling defect in RLL pulmonary arterial branch extending in several segmental and subsegmental branches, suspicious for pulmonary emboli  No central pulmonary embolism seen  No discrete evidence of right heart strain  Ill-defined opacity in periphery of the right lower lobe, non-specific but could represent developing pulmonary infarct"  · Did have +TIA noted during workup of transaminitis- possible underlying lupus could be etiology? · Thrombosis panel ordered  · S/p therapeutic lovenox  Continue daily lovenox  · Given BMI will likely require Warfarin outpatient for maintenance treatment  · Obtain echo  · Monitor on tele   · Hemodynamically stable on RA - continue to monitor VS and respiratory status    COPD (chronic obstructive pulmonary disease) (HCC)  Assessment & Plan  · Does not appear in acute exacerbation  · Continue albuterol nebs p r n  VTE Pharmacologic Prophylaxis: VTE Score: 7 therapeutic lovenox  Code Status: Level 2 - DNAR: but accepts endotracheal intubation   Discussion with family: Patient declined call to   Anticipated Length of Stay: Patient will be admitted on an inpatient basis with an anticipated length of stay of greater than 2 midnights secondary to acute PE  Total Time for Visit, including Counseling / Coordination of Care: 60 minutes Greater than 50% of this total time spent on direct patient counseling and coordination of care      Chief Complaint: chest pain    History of Present Illness:  Marina Finley is a 61 y o  female with a PMH of COPD, fibromyalgia, obesity who presents with worsening chest discomfort over the past day  Associated with SOB  Reports sharp, pleuritic pain located centrally and in area of R lower lung field  She denies radiation of pain, abdominal pain, N/V/D, paresthesias, lightheadedness/dizziness, wheezing, fever, chills, recent sick contacts  In ED had positive CTA for PE  She denies history of DVT/PE, personal or family history of clotting disorders, exogenous estrogen use, smoking, recent surgery/immbolization/travel  Does report recently positive TIA screen  Review of Systems:  Review of Systems   Constitutional: Negative for chills, fatigue and fever  HENT: Negative for congestion  Respiratory: Positive for shortness of breath  Negative for cough, chest tightness and wheezing  Cardiovascular: Positive for chest pain  Negative for palpitations and leg swelling  Gastrointestinal: Negative for abdominal pain, diarrhea, nausea and vomiting  Endocrine: Negative for polyuria  Genitourinary: Negative for difficulty urinating, dysuria, hematuria and urgency  Musculoskeletal: Positive for myalgias (generalized)  Neurological: Negative for dizziness, syncope, weakness and light-headedness  Psychiatric/Behavioral: Negative for sleep disturbance  Past Medical and Surgical History:   Past Medical History:   Diagnosis Date    Anxiety     Asthma     Chronic fatigue     COPD [facility]chronic obstructive pulmonary disease) ([facility] [address]     Depression     Fibromyalgia     Head injury     Hypothyroid     Morbid obesity with BMI of 60 0-69 9, adult (HCC)     Sleep apnea     PT states a very mild case, prescribed CPAP but does not use machine         Past Surgical History:   Procedure Laterality Date    APPENDECTOMY       SECTION      ESOPHAGOGASTRODUODENOSCOPY      HERNIA REPAIR      Primary repair of incarcerated umbilical hernia - Dr Claudio Eli      Hysterectomy/revise vagina    KNEE SURGERY      Arthrotomy with open meniscus repair     LAPAROSCOPIC CHOLECYSTECTOMY      TONSILECTOMY AND ADNOIDECTOMY      TUMOR REMOVAL      head    URETER SURGERY      repair    WISDOM TOOTH EXTRACTION         Meds/Allergies:  Prior to Admission medications    Medication Sig Start Date End Date Taking? Authorizing Provider   DULoxetine (CYMBALTA) 60 mg delayed release capsule Take 60 mg by mouth daily 6/3/22  Yes Historical Provider, MD   levothyroxine 50 mcg tablet Take 1 tablet (50 mcg total) by mouth daily in the early morning 6/22/22  Yes APPLE Gillespie   albuterol (2 5 mg/3 mL) 0 083 % nebulizer solution Inhale 1 each  Patient not taking: No sig reported 2/19/13   Historical Provider, MD   albuterol (PROVENTIL HFA,VENTOLIN HFA) 90 mcg/act inhaler Inhale 2 puffs every 6 (six) hours as needed for wheezing or shortness of breath  Patient not taking: No sig reported 11/17/20   APPLE Gillespie   benzonatate (TESSALON PERLES) 100 mg capsule Take 1 capsule (100 mg total) by mouth 3 (three) times a day as needed for cough  Patient not taking: No sig reported 6/8/22   APPLE Gillespie   budesonide (Pulmicort Flexhaler) 180 MCG/ACT inhaler Inhale 2 puffs  in the morning and 2 puffs in the evening  Do all this for 10 days  Rinse mouth after use    5/25/22 6/4/22  Maria T Whitmore PA-C   Diclofenac Sodium (VOLTAREN) 1 % Apply 2 g topically 4 (four) times a day  Patient not taking: No sig reported 5/26/21   APPLE Gillespie   DULoxetine (CYMBALTA) 20 mg capsule Take 40 mg by mouth daily  Patient not taking: No sig reported    Historical Provider, MD   DULoxetine HCl 40 MG CPEP Take 1 capsule (40 mg total) by mouth daily  Patient not taking: No sig reported 7/26/21   APPLE Gillespie   Ibuprofen 200 MG CAPS Take by mouth  Patient not taking: No sig reported    Historical Provider, MD   ipratropium-albuterol (Combivent Respimat) inhaler Inhale  Patient not taking: No sig reported 7/9/15   Historical Provider, MD   omeprazole (PriLOSEC) 20 mg delayed release capsule Take 20 mg by mouth daily  Patient not taking: Reported on 9/10/2022    Historical Provider, MD   traMADol (ULTRAM) 50 mg tablet Take 1 tablet (50 mg total) by mouth every 6 (six) hours as needed for moderate pain  Patient not taking: No sig reported 2/26/21   APPLE Maya     I have reviewed home medications with patient personally  Allergies:    Allergies   Allergen Reactions    Ciprofloxacin Other (See Comments)     Reaction Date: [id number];       Penicillins Other (See Comments)       Social History:  Marital Status: /Civil Union   Occupation:   Patient Pre-hospital Living Situation: Home  Patient Pre-hospital Level of Mobility: walks with walker  Patient Pre-hospital Diet Restrictions:   Substance Use History:   Social History     Substance and Sexual Activity   Alcohol Use Not Currently    Comment: rarely     Social History     Tobacco Use   Smoking Status Former Smoker    Years: 23 50    Types: Cigarettes   Smokeless Tobacco Never Used   Tobacco Comment    3= PPD - As per [place]      Social History     Substance and Sexual Activity   Drug Use Not Currently       Family History:  Family History   Problem Relation Age of Onset    Diabetes Family     Heart disease Family     Depression Mother     Anxiety disorder Mother     No Known Problems Father     No Known Problems Sister     Diabetes Brother     Hypertension Brother     No Known Problems Son     Breast cancer Paternal Grandmother     Esophageal cancer Paternal Grandfather     Skin cancer Paternal Grandfather     No Known Problems Maternal Grandmother     No Known Problems Maternal Grandfather     Stroke Neg Hx     Thyroid disease Neg Hx        Physical Exam:     Vitals:   Blood Pressure: 111/63 (09/10/22 0115)  Pulse: 90 (09/10/22 0115)  Temperature: 98 6 °F (37 °C) (09/10/22 0115)  Temp Source: Tympanic (09/10/22 0115)  Respirations: 20 (09/10/22 0115)  Height: 5' 3" (160 cm) (09/10/22 0115)  Weight - Scale: (!) 164 kg (361 lb 8 9 oz) (09/10/22 0115)  SpO2: 97 % (09/10/22 0119)    Physical Exam  Vitals and nursing note reviewed  Constitutional:       General: She is not in acute distress  Appearance: She is well-developed  She is obese  HENT:      Head: Normocephalic and atraumatic  Mouth/Throat:      Mouth: Mucous membranes are moist    Eyes:      Extraocular Movements: Extraocular movements intact  Pupils: Pupils are equal, round, and reactive to light  Cardiovascular:      Rate and Rhythm: Normal rate and regular rhythm  Pulses: Normal pulses  Heart sounds: Normal heart sounds  No murmur heard  Pulmonary:      Effort: Pulmonary effort is normal  No respiratory distress  Breath sounds: Normal breath sounds  No wheezing or rales  Chest:      Chest wall: Tenderness present  Abdominal:      General: Abdomen is flat  Bowel sounds are normal  There is no distension  Palpations: Abdomen is soft  Tenderness: There is no abdominal tenderness  Musculoskeletal:      Right lower leg: No edema  Left lower leg: No edema  Skin:     General: Skin is warm and dry  Neurological:      General: No focal deficit present  Mental Status: She is alert and oriented to person, place, and time  Cranial Nerves: No cranial nerve deficit     Psychiatric:         Mood and Affect: Mood normal          Behavior: Behavior normal           Additional Data:   Lab Results:  Results from last 7 days   Lab Units 09/09/22  2108   WBC Thousand/uL 10 19*   HEMOGLOBIN g/dL 14 2   HEMATOCRIT % 41 1   PLATELETS Thousands/uL 219   NEUTROS PCT % 69   LYMPHS PCT % 19   MONOS PCT % 9   EOS PCT % 2     Results from last 7 days   Lab Units 09/09/22  2108   SODIUM mmol/L 135   POTASSIUM mmol/L 3 7   CHLORIDE mmol/L 103   CO2 mmol/L 25   BUN mg/dL 13   CREATININE mg/dL 0 91   ANION GAP mmol/L 7   CALCIUM mg/dL 9 3   ALBUMIN g/dL 4 2   TOTAL BILIRUBIN mg/dL 0 58   ALK PHOS U/L 68   ALT U/L 22   AST U/L 19   GLUCOSE RANDOM mg/dL 101     Results from last 7 days   Lab Units 09/09/22  2108   INR  1 03                   Imaging: Reviewed radiology reports from this admission including: chest CT scan and abdominal/pelvic CT  PE Study with CT Abdomen and Pelvis with contrast   Final Result by Michelle Stevens DO (09/09 2348)      Filling defect seen in the right lower lobe pulmonary arterial branch extending into several segmental and subsegmental branches (axial image 127, series 406), suspicious for pulmonary emboli  No central pulmonary embolism is seen  No discrete evidence    of right heart strain  Ill-defined opacity in the periphery of the right lower lobe, nonspecific but could represent developing pulmonary infarct  Small dependent right pleural effusion  Left renal cyst, and other findings as above  I personally discussed this study with [facility] on 9/9/2022 at 11:48 PM                   Workstation performed: [id number]         XR chest 1 view portable   ED Interpretation by Mcarthur Bloch, PA-C (09/09 2213)   No acute cardiopulmonary abnormalities          EKG and Other Studies Reviewed on Admission:   · EKG: NSR  HR 86  QTc 438    ** Please Note: This note has been constructed using a voice recognition system   **

## 2022-09-10 NOTE — ASSESSMENT & PLAN NOTE
· Pulmonary embolism with probable pulmonary infarct  · Likely secondary to poor mobility/sedentary state  · Discussed with pharmacy; recent literature does not demonstrate decreased efficacy of NOAC in regards to obesity  · Transitioned to eliquis    Low-dose oxycodone for pain to avoid NSAIDs

## 2022-09-11 LAB
ALBUMIN SERPL BCP-MCNC: 3.7 G/DL (ref 3.5–5)
ALP SERPL-CCNC: 71 U/L (ref 34–104)
ALT SERPL W P-5'-P-CCNC: 45 U/L (ref 7–52)
ANION GAP SERPL CALCULATED.3IONS-SCNC: 7 MMOL/L (ref 4–13)
AST SERPL W P-5'-P-CCNC: 68 U/L (ref 13–39)
BILIRUB SERPL-MCNC: 0.96 MG/DL (ref 0.2–1)
BUN SERPL-MCNC: 15 MG/DL (ref 5–25)
CALCIUM SERPL-MCNC: 8.9 MG/DL (ref 8.4–10.2)
CHLORIDE SERPL-SCNC: 104 MMOL/L (ref 96–108)
CO2 SERPL-SCNC: 27 MMOL/L (ref 21–32)
CREAT SERPL-MCNC: 0.86 MG/DL (ref 0.6–1.3)
ERYTHROCYTE [DISTWIDTH] IN BLOOD BY AUTOMATED COUNT: 12.4 % (ref 11.6–15.1)
GFR SERPL CREATININE-BSD FRML MDRD: 75 ML/MIN/1.73SQ M
GLUCOSE SERPL-MCNC: 97 MG/DL (ref 65–140)
HCT VFR BLD AUTO: 36.3 % (ref 34.8–46.1)
HGB BLD-MCNC: 12.3 G/DL (ref 11.5–15.4)
MCH RBC QN AUTO: 31.1 PG (ref 26.8–34.3)
MCHC RBC AUTO-ENTMCNC: 33.9 G/DL (ref 31.4–37.4)
MCV RBC AUTO: 92 FL (ref 82–98)
PLATELET # BLD AUTO: 187 THOUSANDS/UL (ref 149–390)
PMV BLD AUTO: 11.2 FL (ref 8.9–12.7)
POTASSIUM SERPL-SCNC: 3.5 MMOL/L (ref 3.5–5.3)
PROT SERPL-MCNC: 6.6 G/DL (ref 6.4–8.4)
RBC # BLD AUTO: 3.96 MILLION/UL (ref 3.81–5.12)
SODIUM SERPL-SCNC: 138 MMOL/L (ref 135–147)
WBC # BLD AUTO: 6.5 THOUSAND/UL (ref 4.31–10.16)

## 2022-09-11 PROCEDURE — 85027 COMPLETE CBC AUTOMATED: CPT | Performed by: INTERNAL MEDICINE

## 2022-09-11 PROCEDURE — 99232 SBSQ HOSP IP/OBS MODERATE 35: CPT | Performed by: INTERNAL MEDICINE

## 2022-09-11 PROCEDURE — 80053 COMPREHEN METABOLIC PANEL: CPT | Performed by: INTERNAL MEDICINE

## 2022-09-11 RX ORDER — LIDOCAINE 50 MG/G
1 PATCH TOPICAL ONCE
Status: COMPLETED | OUTPATIENT
Start: 2022-09-11 | End: 2022-09-12

## 2022-09-11 RX ADMIN — PANTOPRAZOLE SODIUM 40 MG: 40 TABLET, DELAYED RELEASE ORAL at 05:28

## 2022-09-11 RX ADMIN — APIXABAN 10 MG: 5 TABLET, FILM COATED ORAL at 19:46

## 2022-09-11 RX ADMIN — LEVOTHYROXINE SODIUM 50 MCG: 125 TABLET ORAL at 05:28

## 2022-09-11 RX ADMIN — BUPROPION HYDROCHLORIDE 50 MG: 100 TABLET, FILM COATED ORAL at 10:39

## 2022-09-11 RX ADMIN — ACETAMINOPHEN 650 MG: 325 TABLET, FILM COATED ORAL at 00:32

## 2022-09-11 RX ADMIN — NYSTATIN: 100000 POWDER TOPICAL at 10:40

## 2022-09-11 RX ADMIN — ACETAMINOPHEN 650 MG: 325 TABLET, FILM COATED ORAL at 21:21

## 2022-09-11 RX ADMIN — BUPROPION HYDROCHLORIDE 50 MG: 100 TABLET, FILM COATED ORAL at 19:46

## 2022-09-11 RX ADMIN — LIDOCAINE 5% 1 PATCH: 700 PATCH TOPICAL at 10:40

## 2022-09-11 RX ADMIN — APIXABAN 10 MG: 5 TABLET, FILM COATED ORAL at 10:39

## 2022-09-11 RX ADMIN — DULOXETINE HYDROCHLORIDE 60 MG: 60 CAPSULE, DELAYED RELEASE ORAL at 10:39

## 2022-09-11 RX ADMIN — NYSTATIN: 100000 POWDER TOPICAL at 19:46

## 2022-09-11 RX ADMIN — LIDOCAINE 5% 1 PATCH: 700 PATCH TOPICAL at 21:12

## 2022-09-11 RX ADMIN — LIDOCAINE 5% 1 PATCH: 700 PATCH TOPICAL at 00:32

## 2022-09-11 NOTE — UTILIZATION REVIEW
Initial Clinical Review    Admission: Date/Time/Statement:   Admission Orders (From admission, onward)     Ordered        09/10/22 0016  INPATIENT ADMISSION  Once                      Orders Placed This Encounter   Procedures    INPATIENT ADMISSION     Standing Status:   Standing     Number of Occurrences:   1     Order Specific Question:   Level of Care     Answer:   Med Surg [16]     Order Specific Question:   Bed Type     Answer:   Sly [4]     Order Specific Question:   Bed request comments     Answer:   Tele     Order Specific Question:   Estimated length of stay     Answer:   More than 2 Midnights     Order Specific Question:   Certification     Answer:   I certify that inpatient services are medically necessary for this patient for a duration of greater than two midnights  See H&P and MD Progress Notes for additional information about the patient's course of treatment  ED Arrival Information     Expected   -    Arrival   9/9/2022 20:34    Acuity   Urgent            Means of arrival   Walk-In    Escorted by   Family Member    Service   Hospitalist    Admission type   Urgent            Arrival complaint   FLANK PAIN SOB           Chief Complaint   Patient presents with    Chest Pain     Patient reports chest pain and SOB since yesterday  Initial Presentation: 61 y o  female presents to the ED from home with c/o worsening sharp, pleuritic central chest and R lung discomfort x 1 day with SOB  PMH: COPD, fibromyalgia, obesity  In the ED she was + for PE on study  Imaging also showed R small pleural effusion  She recently had + TIA screen  In the ED she was treated with IV Toradol and Sq Lovenox  She has mild leukocytosis and elevated D dimer  On exam she has tenderness to chest wall, A&O  She is admitted to INPATIENT status with PE - llkimmiely d/t sedentary lifestyle, obesity - daily Lovenox, thrombosis panel, echo, Tele  Date: 9/11   Day 2:   Transitioned from Lovenox to Eliquis today  On exam she continues to have R side back and lung pain especially with deep inspiration from the PE  Has decreased breath sounds  ED Triage Vitals   Temperature Pulse Respirations Blood Pressure SpO2   09/09/22 2054 09/09/22 2048 09/09/22 2048 09/09/22 2048 09/09/22 2048   97 9 °F (36 6 °C) 86 20 138/75 97 %      Temp Source Heart Rate Source Patient Position - Orthostatic VS BP Location FiO2 (%)   09/09/22 2054 09/09/22 2048 09/09/22 2048 09/09/22 2048 --   Oral Monitor Lying Right arm       Pain Score       09/09/22 2048       6          Wt Readings from Last 1 Encounters:   09/10/22 (!) 164 kg (361 lb 8 9 oz)     Additional Vital Signs:   09/11/22 0712 97 9 °F (36 6 °C) 76 19 126/48 Abnormal  78 96 % None (Room air) Lying   09/10/22 1937 97 7 °F (36 5 °C) 68 16 111/55 -- 98 % -- Lying   09/10/22 1514 98 1 °F (36 7 °C) 65 18 105/54 71 96 % None (Room air) Lying   09/10/22 1147 97 8 °F (36 6 °C) 65 17 108/65 79 98 % None (Room air) Lying   09/10/22 0655 97 8 °F (36 6 °C) 64 16 102/53 72 100 % None (Room air) Lying   09/10/22 0600 97 8 °F (36 6 °C) 62 16 102/52 -- 97 % None (Room air) Lying   09/10/22 0119 -- -- -- -- -- 97 % None (Room air) --   09/10/22 0115 98 6 °F (37 °C) 90 20 111/63 -- -- -- --   09/10/22 0017 -- 80 20 137/63 80 97 % None (Room air) Lying   09/09/22 2212 -- 76 20 135/63 -- 98 % None (Room air) Sitting     Pertinent Labs/Diagnostic Test Results:     9/10 ECG - NSR    9/10 Echo - P     PE Study with CT Abdomen and Pelvis with contrast   Final Result by Devang Barboza DO (09/09 2348)      Filling defect seen in the right lower lobe pulmonary arterial branch extending into several segmental and subsegmental branches (axial image 127, series 406), suspicious for pulmonary emboli  No central pulmonary embolism is seen  No discrete evidence    of right heart strain        Ill-defined opacity in the periphery of the right lower lobe, nonspecific but could represent developing pulmonary infarct  Small dependent right pleural effusion  Left renal cyst, and other findings as above  I personally discussed this study with [address] on 9/9/2022 at 11:48 PM                   Workstation performed: [id number]         XR chest 1 view portable   ED Interpretation by Haseeb Kirkland PA-C (09/09 2213)   No acute cardiopulmonary abnormalities      Final Result by Pat Pettit MD (09/10 1244)      No acute cardiopulmonary disease                    Workstation performed: [id number]               Results from last 7 days   Lab Units 09/11/22  0649 09/09/22 2108   WBC Thousand/uL 6 50 10 19*   HEMOGLOBIN g/dL 12 3 14 2   HEMATOCRIT % 36 3 41 1   PLATELETS Thousands/uL 187 219   NEUTROS ABS Thousands/µL  --  7 11         Results from last 7 days   Lab Units 09/11/22  0443 09/10/22  0501 09/09/22 2108   SODIUM mmol/L 138 138 135   POTASSIUM mmol/L 3 5 3 5 3 7   CHLORIDE mmol/L 104 104 103   CO2 mmol/L 27 26 25   ANION GAP mmol/L 7 8 7   BUN mg/dL 15 13 13   CREATININE mg/dL 0 86 0 88 0 91   EGFR ml/min/1 73sq m 75 73 70   CALCIUM mg/dL 8 9 9 1 9 3   MAGNESIUM mg/dL  --   --  1 9     Results from last 7 days   Lab Units 09/11/22  0443 09/09/22 2108   AST U/L 68* 19   ALT U/L 45 22   ALK PHOS U/L 71 68   TOTAL PROTEIN g/dL 6 6 7 4   ALBUMIN g/dL 3 7 4 2   TOTAL BILIRUBIN mg/dL 0 96 0 58         Results from last 7 days   Lab Units 09/11/22  0443 09/10/22  0501 09/09/22 2108   GLUCOSE RANDOM mg/dL 97 73 101     Results from last 7 days   Lab Units 09/09/22  2252 09/09/22 2108   HS TNI 0HR ng/L  --  2   HS TNI 2HR ng/L 2  --    HSTNI D2 ng/L 0  --      Results from last 7 days   Lab Units 09/09/22 2108   D-DIMER QUANTITATIVE ug/ml FEU 1 82*     Results from last 7 days   Lab Units 09/09/22 2108   PROTIME seconds 13 8   INR  1 03   PTT seconds 24     Results from last 7 days   Lab Units 09/09/22  2108   BNP pg/mL 20             Results from last 7 days   Lab Units 09/09/22  2108   LIPASE u/L 20         ED Treatment:   Medication Administration from 09/09/2022 2034 to 09/10/2022 0045       Date/Time Order Dose Route Action     09/09/2022 2222 ketorolac (TORADOL) injection 15 mg 15 mg Intravenous Given     09/09/2022 2242 iohexol (OMNIPAQUE) 350 MG/ML injection (SINGLE-DOSE) 100 mL 100 mL Intravenous Given     09/10/2022 0013 enoxaparin (LOVENOX) subcutaneous injection 160 mg 160 mg Subcutaneous Given        Past Medical History:   Diagnosis Date    Anxiety     Asthma     Chronic fatigue     [facility]     Depression     Fibromyalgia     Head injury     Hypothyroid     Morbid obesity with BMI of 60 0-69 9, adult ([facility] [address] )     Sleep apnea     PT states a very mild case, prescribed CPAP but does not use machine  Present on Admission:   COPD (chronic obstructive pulmonary disease) ([facility]   Hypothyroid   Depression   GERD (gastroesophageal reflux disease)      Admitting Diagnosis: Chest pain [R07 9]  Pulmonary embolism and infarction ([facility]) [I26 99]  SOB (shortness of breath) [R06 02]  Age/Sex: 61 y o  female  Admission Orders:  Scheduled Medications:  apixaban, 10 mg, Oral, BID   Followed by  [facility] 9/17/2022] apixaban, 5 mg, Oral, BID  buPROPion, 50 mg, Oral, BID  DULoxetine, 60 mg, Oral, Daily  levothyroxine, 50 mcg, Oral, Early Morning  lidocaine, 1 patch, Topical, Daily  nystatin, , Topical, BID  pantoprazole, 40 mg, Oral, Early Morning      Continuous IV Infusions:     PRN Meds:  acetaminophen, 650 mg, Oral, Q6H PRN -x 1 9/11  albuterol, 2 5 mg, Nebulization, Q6H PRN  ondansetron, 4 mg, Intravenous, Q6H PRN  oxyCODONE, 5 mg, Oral, Q4H PRN -x 2 9/10    Thrombosis panel  Continuous pulse ox  Echo  Tele    Network Utilization Review Department  ATTENTION: Please call with any questions or concerns to [phone] and carefully listen to the prompts so that you are directed to the right person   All voicemails are [facility] all requests for admission clinical reviews, approved or denied determinations and any other requests to dedicated fax number below belonging to the campus where the patient is receiving treatment   List of dedicated fax numbers for the Facilities:  [address] [place] [address] DENIALS (Administrative/Medical Necessity) [phone]   [address] (Maternity/NICU/Pediatrics) [phone]   [address]  [address] [place] [address] [address] [place] [address] [address] Yeung [address] [facility] [facility] [phone]

## 2022-09-11 NOTE — PROGRESS NOTES
[address]lu[address]  Progress Note - Denis Oleary 1965, 61 y o  female MRN: [id number]  Unit/Bed#: -01 Encounter: [id number]  Primary Care Provider: APPLE Zhong   Date and time admitted to hospital: 9/9/2022  8:45 PM    * Pulmonary embolus [facility])  Assessment & Plan  · Pulmonary embolism with probable pulmonary infarct  · Likely secondary to poor mobility/sedentary state  · Discussed with pharmacy; recent literature does not demonstrate decreased efficacy of NOAC in regards to obesity  · Transitioned to eliquis  Low-dose oxycodone for pain to avoid NSAIDs    GERD (gastroesophageal reflux disease)  Assessment & Plan  · Continue pantoprazole    COPD (chronic obstructive pulmonary disease) ([facility])  Assessment & Plan  · No exacerbation on albuterol as needed    Depression  Assessment & Plan  · Continue wellbutrin      Hypothyroid  Assessment & Plan  · Continue levothyroxine      Morbid obesity with BMI of 60 0-69 9, adult ([facility])  Assessment & Plan  · Body mass index is 64 05 kg/m²  VTE Pharmacologic Prophylaxis: VTE Score: 7 High Risk (Score >/= 5) - Pharmacological DVT Prophylaxis Ordered: apixaban (Eliquis)  Sequential Compression Devices Ordered  Patient Centered Rounds: I have performed bedside rounds with nursing staff today  Discussions with Specialists or Other Care Team Provider:     Education and Discussions with Family / Patient: Attempted to update  () via phone  Left voicemail  Time Spent for Care: 20 mins  More than 50% of total time spent on counseling and coordination of care as described above  Current Length of Stay: 1 day(s)  Current Patient Status: Inpatient   Certification Statement: The patient will continue to require additional inpatient hospital stay due to lung pain from pulmonary embolism  Discharge Plan / Estimated Discharge Date: Anticipate discharge tomorrow to home      Code Status: Level 2 - DNAR: but accepts endotracheal intubation      Subjective:   Patient seen and examined  Still having a lot of right-sided back and lung pains especially with deep inspiration    Objective:   Vitals: Blood pressure (!) 126/48, pulse 76, temperature 97 9 °F (36 6 °C), temperature source Tympanic, resp  rate 19, height 5' 3" (1 6 m), weight (!) 164 kg (361 lb 8 9 oz), SpO2 96 %  Intake/Output Summary (Last 24 hours) at 9/11/2022 1318  Last data filed at 9/11/2022 0401  Gross per 24 hour   Intake --   Output 800 ml   Net -800 ml       Physical Exam  Vitals reviewed  Constitutional:       General: She is not in acute distress  Appearance: Normal appearance  She is obese  HENT:      Head: Atraumatic  Eyes:      Extraocular Movements: Extraocular movements intact  Cardiovascular:      Rate and Rhythm: Regular rhythm  Heart sounds: Normal heart sounds  Pulmonary:      Breath sounds: Decreased breath sounds present  No wheezing  Abdominal:      General: Bowel sounds are normal       Palpations: Abdomen is soft  Tenderness: There is no guarding or rebound  Musculoskeletal:         General: No swelling  Cervical back: Normal range of motion  Skin:     General: Skin is warm  Neurological:      Mental Status: She is alert and oriented to person, place, and time  Mental status is at baseline     Psychiatric:         Mood and Affect: Mood normal        Additional Data:   Labs:  Results from last 7 days   Lab Units 09/11/22  0649 09/09/22  2108   WBC Thousand/uL 6 50 10 19*   HEMOGLOBIN g/dL 12 3 14 2   PLATELETS Thousands/uL 187 219   MCV fL 92 90   INR   --  1 03     Results from last 7 days   Lab Units 09/11/22  0443 09/10/22  0501 09/09/22  2108   SODIUM mmol/L 138 138 135   POTASSIUM mmol/L 3 5 3 5 3 7   CHLORIDE mmol/L 104 104 103   CO2 mmol/L 27 26 25   ANION GAP mmol/L 7 8 7   BUN mg/dL 15 13 13   CREATININE mg/dL 0 86 0 88 0 91   CALCIUM mg/dL 8 9 9 1 9 3   ALBUMIN g/dL 3 7  --  4 2   TOTAL BILIRUBIN mg/dL 0 96  --  0 58   ALK PHOS U/L 71  --  68   ALT U/L 45  --  22   AST U/L 68*  --  19   EGFR ml/min/1 73sq m 75 73 70   GLUCOSE RANDOM mg/dL 97 73 101     Results from last 7 days   Lab Units 22  2108   MAGNESIUM mg/dL 1 9         Results from last 7 days   Lab Units 22  2252 22  2108   HS TNI 0HR ng/L  --  2   HS TNI 2HR ng/L 2  --                           * I Have Reviewed All Lab Data Listed Above  Cultures:                   Lines/Drains:  Invasive Devices  Report    Peripheral Intravenous Line  Duration           Peripheral IV 22 Left Antecubital 1 day              Telemetry:   Telemetry Orders (From admission, onward)             24 Hour Telemetry Monitoring  Continuous x 24 Hours (Telem)           References:    Telemetry Guidelines   Question:  Reason for 24 Hour Telemetry  Answer:  Pulmonary Embolism - 24 hours without resp  compromise, dysrhythmias, hemodynamically stable                  Imaging:  Imaging Reports Reviewed Today Include:   XR chest 1 view portable    Result Date: 9/10/2022  Impression: No acute cardiopulmonary disease  Workstation performed: [id number]     PE Study with CT Abdomen and Pelvis with contrast    Result Date: 2022  Impression: Filling defect seen in the right lower lobe pulmonary arterial branch extending into several segmental and subsegmental branches (axial image 127, series 406), suspicious for pulmonary emboli  No central pulmonary embolism is seen  No discrete evidence  of right heart strain  Ill-defined opacity in the periphery of the right lower lobe, nonspecific but could represent developing pulmonary infarct  Small dependent right pleural effusion  Left renal cyst, and other findings as above   I personally discussed this study with  [employer] on 2022 at 11:48 PM  Workstation performed: [id number]       Scheduled Meds:  Current Facility-Administered Medications   Medication Dose Route Frequency Provider Last Rate  acetaminophen  650 mg Oral Q6H PRN Belén Tijerina PA-C      albuterol  2 5 mg Nebulization Q6H PRN Belén Tijerina PA-C      apixaban  10 mg Oral BID Delisa Saenz DO      Followed by   Kiley Shannon ON 9/17/2022] apixaban  5 mg Oral BID Delisa Saenz DO      buPROPion  50 mg Oral BID Belén Tijerina, ROSALINA      DULoxetine  60 mg Oral Daily Belén Tijerina, ROSALINA      levothyroxine  50 mcg Oral Early Morning Belén Tijerina, ROSALINA      lidocaine  1 patch Topical Daily Amalia Tavares, [address]      nystatin   Topical BID Belén Tijerina, ROSALINA      ondansetron  4 mg Intravenous Q6H PRN Coby Morgan PA-C      oxyCODONE  5 mg Oral Q4H PRN Delisa Saenz DO      pantoprazole  40 mg Oral Early Morning Belén Tijerina PA-C         Today, Patient Was Seen By: Delisa Saenz DO    ** Please Note: Dictation voice to text software may have been used in the creation of this document   **

## 2022-09-11 NOTE — PLAN OF CARE
Problem: CARDIOVASCULAR - ADULT  Goal: Maintains optimal cardiac output and hemodynamic stability  Description: INTERVENTIONS:  - Monitor I/O, vital signs and rhythm  - Monitor for S/S and trends of decreased cardiac output  - Administer and titrate ordered vasoactive medications to optimize hemodynamic stability  - Assess quality of pulses, skin color and temperature  - Assess for signs of decreased coronary artery perfusion  - Instruct patient to report change in severity of symptoms  Outcome: Progressing     Problem: RESPIRATORY - ADULT  Goal: Achieves optimal ventilation and oxygenation  Description: INTERVENTIONS:  - Assess for changes in respiratory status  - Assess for changes in mentation and behavior  - Position to facilitate oxygenation and minimize respiratory effort  - Oxygen administered by appropriate delivery if ordered  - Initiate smoking cessation education as indicated  - Encourage broncho-pulmonary hygiene including cough, deep breathe, Incentive Spirometry  - Assess the need for suctioning and aspirate as needed  - Assess and instruct to report SOB or any respiratory difficulty  - Respiratory Therapy support as indicated  Outcome: Progressing     Problem: Potential for Falls  Goal: Patient will remain free of falls  Description: INTERVENTIONS:  - Educate patient/family on patient safety including physical limitations  - Instruct patient to call for assistance with activity   - Consult OT/PT to assist with strengthening/mobility   - Keep Call bell within reach  - Keep bed low and locked with side rails adjusted as appropriate  - Keep care items and personal belongings within reach  - Initiate and maintain comfort rounds  - Offer Toileting every 2 Hours, in advance of need  - Initiate/Maintain bed alarm  - Apply yellow socks and bracelet for high fall risk patients  - Consider moving patient to room near nurses station  Outcome: Progressing

## 2022-09-12 ENCOUNTER — TRANSITIONAL CARE MANAGEMENT (OUTPATIENT)
Dept: FAMILY MEDICINE CLINIC | Facility: CLINIC | Age: 57
End: 2022-09-12

## 2022-09-12 ENCOUNTER — APPOINTMENT (INPATIENT)
Dept: NON INVASIVE DIAGNOSTICS | Facility: HOSPITAL | Age: 57
DRG: 134 | End: 2022-09-12
Payer: COMMERCIAL

## 2022-09-12 VITALS
BODY MASS INDEX: 51.91 KG/M2 | TEMPERATURE: 97.8 F | WEIGHT: 293 LBS | SYSTOLIC BLOOD PRESSURE: 137 MMHG | DIASTOLIC BLOOD PRESSURE: 66 MMHG | OXYGEN SATURATION: 99 % | HEIGHT: 63 IN | HEART RATE: 71 BPM | RESPIRATION RATE: 16 BRPM

## 2022-09-12 PROBLEM — R74.01 TRANSAMINITIS: Status: ACTIVE | Noted: 2022-09-12

## 2022-09-12 LAB
ALBUMIN SERPL BCP-MCNC: 3.7 G/DL (ref 3.5–5)
ALP SERPL-CCNC: 85 U/L (ref 34–104)
ALT SERPL W P-5'-P-CCNC: 79 U/L (ref 7–52)
ANION GAP SERPL CALCULATED.3IONS-SCNC: 10 MMOL/L (ref 4–13)
AORTIC ROOT: 2.8 CM
APICAL FOUR CHAMBER EJECTION FRACTION: 60 %
AST SERPL W P-5'-P-CCNC: 87 U/L (ref 13–39)
ATRIAL RATE: 87 BPM
BILIRUB SERPL-MCNC: 0.69 MG/DL (ref 0.2–1)
BUN SERPL-MCNC: 18 MG/DL (ref 5–25)
CALCIUM SERPL-MCNC: 9.2 MG/DL (ref 8.4–10.2)
CHLORIDE SERPL-SCNC: 105 MMOL/L (ref 96–108)
CO2 SERPL-SCNC: 25 MMOL/L (ref 21–32)
CREAT SERPL-MCNC: 0.86 MG/DL (ref 0.6–1.3)
DEPRECATED AT III PPP: 92 % OF NORMAL (ref 92–136)
E WAVE DECELERATION TIME: 254 MS
ERYTHROCYTE [DISTWIDTH] IN BLOOD BY AUTOMATED COUNT: 12.5 % (ref 11.6–15.1)
FRACTIONAL SHORTENING: 31 % (ref 28–44)
GFR SERPL CREATININE-BSD FRML MDRD: 75 ML/MIN/1.73SQ M
GLUCOSE SERPL-MCNC: 77 MG/DL (ref 65–140)
HCT VFR BLD AUTO: 37.7 % (ref 34.8–46.1)
HGB BLD-MCNC: 12.8 G/DL (ref 11.5–15.4)
INTERVENTRICULAR SEPTUM IN DIASTOLE (PARASTERNAL SHORT AXIS VIEW): 0.9 CM
INTERVENTRICULAR SEPTUM: 0.9 CM (ref 0.6–1.1)
LAAS-AP4: 20.2 CM2
LEFT ATRIUM SIZE: 2.9 CM
LEFT INTERNAL DIMENSION IN SYSTOLE: 3.3 CM (ref 2.1–4)
LEFT VENTRICULAR INTERNAL DIMENSION IN DIASTOLE: 4.8 CM (ref 3.5–6)
LEFT VENTRICULAR POSTERIOR WALL IN END DIASTOLE: 0.8 CM
LEFT VENTRICULAR STROKE VOLUME: 60 ML
LVSV (TEICH): 60 ML
MCH RBC QN AUTO: 31 PG (ref 26.8–34.3)
MCHC RBC AUTO-ENTMCNC: 34 G/DL (ref 31.4–37.4)
MCV RBC AUTO: 91 FL (ref 82–98)
MV E'TISSUE VEL-SEP: 10 CM/S
MV PEAK A VEL: 0.68 M/S
MV PEAK E VEL: 87 CM/S
MV STENOSIS PRESSURE HALF TIME: 74 MS
MV VALVE AREA P 1/2 METHOD: 2.97 CM2
P AXIS: 45 DEGREES
PLATELET # BLD AUTO: 208 THOUSANDS/UL (ref 149–390)
PMV BLD AUTO: 12 FL (ref 8.9–12.7)
POTASSIUM SERPL-SCNC: 3.6 MMOL/L (ref 3.5–5.3)
PR INTERVAL: 175 MS
PROT SERPL-MCNC: 6.8 G/DL (ref 6.4–8.4)
QRS AXIS: 15 DEGREES
QRSD INTERVAL: 82 MS
QT INTERVAL: 366 MS
QTC INTERVAL: 438 MS
RA PRESSURE ESTIMATED: 3 MMHG
RBC # BLD AUTO: 4.13 MILLION/UL (ref 3.81–5.12)
RIGHT ATRIAL 2D VOLUME: 40 ML
RIGHT ATRIUM AREA SYSTOLE A4C: 16.7 CM2
RIGHT VENTRICLE ID DIMENSION: 3.5 CM
RV PSP: 26 MMHG
SL CV LV EF: 60
SL CV PED ECHO LEFT VENTRICLE DIASTOLIC VOLUME (MOD BIPLANE) 2D: 106 ML
SL CV PED ECHO LEFT VENTRICLE SYSTOLIC VOLUME (MOD BIPLANE) 2D: 45 ML
SODIUM SERPL-SCNC: 140 MMOL/L (ref 135–147)
T WAVE AXIS: 19 DEGREES
TR MAX PG: 23 MMHG
TR PEAK VELOCITY: 2.4 M/S
TRICUSPID VALVE PEAK REGURGITATION VELOCITY: 2.38 M/S
VENTRICULAR RATE: 86 BPM
WBC # BLD AUTO: 6.26 THOUSAND/UL (ref 4.31–10.16)

## 2022-09-12 PROCEDURE — 93306 TTE W/DOPPLER COMPLETE: CPT

## 2022-09-12 PROCEDURE — 93010 ELECTROCARDIOGRAM REPORT: CPT | Performed by: INTERNAL MEDICINE

## 2022-09-12 PROCEDURE — 99239 HOSP IP/OBS DSCHRG MGMT >30: CPT | Performed by: PHYSICIAN ASSISTANT

## 2022-09-12 PROCEDURE — 80053 COMPREHEN METABOLIC PANEL: CPT | Performed by: INTERNAL MEDICINE

## 2022-09-12 PROCEDURE — 85027 COMPLETE CBC AUTOMATED: CPT | Performed by: INTERNAL MEDICINE

## 2022-09-12 PROCEDURE — 93306 TTE W/DOPPLER COMPLETE: CPT | Performed by: INTERNAL MEDICINE

## 2022-09-12 RX ORDER — LIDOCAINE 50 MG/G
2 PATCH TOPICAL DAILY
Qty: 20 PATCH | Refills: 0 | Status: SHIPPED | OUTPATIENT
Start: 2022-09-13 | End: 2022-09-23

## 2022-09-12 RX ORDER — ACETAMINOPHEN 325 MG/1
TABLET ORAL
Status: DISCONTINUED
Start: 2022-09-12 | End: 2022-09-12 | Stop reason: HOSPADM

## 2022-09-12 RX ORDER — ACETAMINOPHEN 325 MG/1
650 TABLET ORAL EVERY 6 HOURS PRN
Qty: 30 TABLET | Refills: 0 | Status: SHIPPED | OUTPATIENT
Start: 2022-09-12

## 2022-09-12 RX ADMIN — BUPROPION HYDROCHLORIDE 50 MG: 100 TABLET, FILM COATED ORAL at 08:29

## 2022-09-12 RX ADMIN — LEVOTHYROXINE SODIUM 50 MCG: 125 TABLET ORAL at 05:07

## 2022-09-12 RX ADMIN — LIDOCAINE 5% 1 PATCH: 700 PATCH TOPICAL at 08:31

## 2022-09-12 RX ADMIN — NYSTATIN: 100000 POWDER TOPICAL at 08:31

## 2022-09-12 RX ADMIN — APIXABAN 10 MG: 5 TABLET, FILM COATED ORAL at 08:30

## 2022-09-12 RX ADMIN — ACETAMINOPHEN 650 MG: 325 TABLET, FILM COATED ORAL at 05:37

## 2022-09-12 RX ADMIN — DULOXETINE HYDROCHLORIDE 60 MG: 60 CAPSULE, DELAYED RELEASE ORAL at 08:30

## 2022-09-12 RX ADMIN — PANTOPRAZOLE SODIUM 40 MG: 40 TABLET, DELAYED RELEASE ORAL at 05:07

## 2022-09-12 NOTE — ASSESSMENT & PLAN NOTE
· Patient with recent transaminitis/inflammatory workup PTA  · Mildly elevated AST/ALT during admission, no abdominal complaints  · +TIA, ? underlying lupus or Sjogrens  · Obtain hepatic function panel in 1 week on discharge  · Follow-up with known GI outpatient

## 2022-09-12 NOTE — DISCHARGE SUMMARY
Jose 44  Discharge- Candace Duncan 1965, 61 y o  female MRN: [id number]  Unit/Bed#: -01 Encounter: [id number]  Primary Care Provider: APPLE Kidd   Date and time admitted to hospital: 9/9/2022  8:45 PM    * Pulmonary embolus [facility])  Assessment & Plan  Patient presented with pleuritic chest pain and SOB  Denies hx of DVT/PE, recent travel/immbolization/surgery, known personal/family history of clotting disorders  Not active smoker, no exogenous estrogen usuage  Hx obesity  · CTA c/a/p: filling defect in right lower lobe pulmonary arterial branch suspicious for PE  No discrete evidence of right heart strain  Ill-defined opacity in right lower lobe, could refer represent developing pulmonary infarct  Small dependent right pleural effusion  · D-dimer: 1 82  · Likely secondary to poor mobility/sedentary state  · Previous provider discussed with pharmacy; recent literature does not demonstrate decreased efficacy of NOAC in regards to obesity  · ECHO: EF 62%, normal systolic and diastolic function  No pulmonary hypertension  · C/w Eliquis, price check per pharmacy, patient agreeable     · Thrombosis panel obtained, results pending  · Will give script to obtain outpatient bilateral lower extremity venous duplex to r/o DVT  · Will give referral to follow-up with hematology outpatient  · Discharge with lidocaine patches and Tylenol PRN, avoid NSAIDs    Transaminitis  Assessment & Plan  · Patient with recent transaminitis/inflammatory workup PTA  · Mildly elevated AST/ALT during admission, no abdominal complaints  · +TIA, ? underlying lupus or Sjogrens  · Obtain hepatic function panel in 1 week on discharge  · Follow-up with known GI outpatient    GERD (gastroesophageal reflux disease)  Assessment & Plan  · Continue pantoprazole    COPD (chronic obstructive pulmonary disease) ([facility] )  Assessment & Plan  · No acute exacerbation   · Continue albuterol as needed    Depression  Assessment & Plan  · Continue Wellbutrin      Hypothyroid  Assessment & Plan  · Continue levothyroxine      Morbid obesity with BMI of 60 0-69 9, adult ([facility])  Assessment & Plan  · Body mass index is 63 95 kg/m²  · Encouraged dietary/lifestyle modifications    Medical Problems             Resolved Problems  Date Reviewed: 9/12/2022   None               Discharging Physician / Practitioner: Jose D Montalvo PA-C  PCP: Denise Ng, [address] [place] [address]  Admission Date:   Admission Orders (From admission, onward)     Ordered        09/10/22 0016  INPATIENT ADMISSION  Once                      Discharge Date: 09/12/22    Consultations During Hospital Stay:  · None    Procedures Performed:   · None    Significant Findings / Test Results:   PE Study with CT Abdomen and Pelvis with contrast   Final Result by Sam Lopez DO (09/09 0927)      Filling defect seen in the right lower lobe pulmonary arterial branch extending into several segmental and subsegmental branches (axial image 127, series 406), suspicious for pulmonary emboli  No central pulmonary embolism is seen  No discrete evidence    of right heart strain  Ill-defined opacity in the periphery of the right lower lobe, nonspecific but could represent developing pulmonary infarct  Small dependent right pleural effusion  Left renal cyst, and other findings as above  I personally discussed this study with [address] on 9/9/2022 at 11:48 PM                   Workstation performed: [id number]         XR chest 1 view portable   ED Interpretation by Kenneth Hdz PA-C (09/09 4743)   No acute cardiopulmonary abnormalities      Final Result by Asael Newsome MD (09/10 1244)      No acute cardiopulmonary disease                    Workstation performed: [id number]         VAS lower limb venous duplex study, complete bilateral    (Results Pending)   · ECHO 9/12:   · Left Ventricle: Left ventricular cavity size is normal  Wall thickness is normal  The left ventricular ejection fraction is 60%  Systolic function is normal  Wall motion is normal  Diastolic function is normal   · Right Ventricle: Right ventricular cavity size is normal  Systolic function is normal   · Tricuspid Valve: There is mild regurgitation  · Pulmonary Artery: The pulmonary artery systolic pressure is normal     Incidental Findings:   · None     Test Results Pending at Discharge (will require follow up): · Thrombosis panel     Outpatient Tests Requested:  · Follow-up with GI outpatient for further evaluation of elevated LFTs  · Follow-up with hematology outpatient for management of PE  · Obtain venous duplex ultrasound of bilateral lower extremities to rule out DVT  · Obtain hepatic function panel in 1 week outpatient    Complications:  None    Reason for Admission:  Pulmonary embolism    Hospital Course:   Agustin Brunner is a 61 y o  female patient, PMH hypothyroidism, COPD, GERD, depression, morbid obesity, transaminitis, who originally presented to the hospital on 9/9/2022 due to pulmonary embolism  Patient presented with pleuritic chest pain and shortness of breath, no known predisposing or provoking factors  On arrival to ED, patient hemodynamically stable, D-dimer elevated  CTA revealed filling defect suspicious for PE and developing pulmonary infarct without right heart strain  Patient was initially started on therapeutic Lovenox  During admission, patient's pleuritic chest pain and SOB had improved, remained stable on room air  Echo was normal  Thrombosis panel was obtained, results pending on discharge  Patient was transitioned to Eliquis, per patient price check with pharmacy was affordable  Suspect patient's poor mobility and/or sedentary state possibly predisposing her to PE  Also consider possible underlying inflammatory disorder given recent positive TIA titer on outpatient workup for transaminitis       Recommend following up with Hematology outpatient, given script to obtain outpatient venous duplex ultrasound of lower extremities  Also recommend following up with known GI for further management of transaminitis, obtain hepatic function panel in 1 week on discharge  Please see above list of diagnoses and related plan for additional information  Condition at Discharge: stable    Discharge Day Visit / Exam:   Subjective:  Patient is seen at bedside this a m , reports that she still has intermittent pleuritic pain with deep breaths but has improved significantly  Patient reports that she had spoke with pharmacist via phone and does not have any up from cost for her Eliquis, agreeable with anticoagulation for discharge  Vitals: Blood Pressure: 137/66 (09/12/22 0901)  Pulse: 71 (09/12/22 0901)  Temperature: 97 8 °F (36 6 °C) (09/12/22 0724)  Temp Source: Oral (09/12/22 0724)  Respirations: 16 (09/12/22 0724)  Height: 5' 3" (160 cm) (09/12/22 0901)  Weight - Scale: (!) 164 kg (361 lb) (09/12/22 0901)  SpO2: 99 % (09/12/22 0724)  Exam:   Physical Exam  Constitutional:       General: She is not in acute distress  Appearance: She is obese  She is not ill-appearing, toxic-appearing or diaphoretic  Cardiovascular:      Rate and Rhythm: Normal rate and regular rhythm  Pulses: Normal pulses  Heart sounds: Normal heart sounds  Pulmonary:      Effort: Pulmonary effort is normal  No respiratory distress  Comments: Decreased breath sounds across lung fields  Abdominal:      General: Bowel sounds are normal  There is no distension  Palpations: Abdomen is soft  Tenderness: There is no abdominal tenderness  Musculoskeletal:         General: No swelling or tenderness  Skin:     General: Skin is warm  Neurological:      General: No focal deficit present  Mental Status: She is alert     Psychiatric:         Mood and Affect: Mood normal          Behavior: Behavior normal          Discussion with Family: Patient declined call to   Discharge instructions/Information to patient and family:   See after visit summary for information provided to patient and family  Provisions for Follow-Up Care:  See after visit summary for information related to follow-up care and any pertinent home health orders  Disposition:   Home    Planned Readmission: None     Discharge Statement:  I spent 45 minutes discharging the patient  This time was spent on the day of discharge  I had direct contact with the patient on the day of discharge  Greater than 50% of the total time was spent examining patient, answering all patient questions, arranging and discussing plan of care with patient as well as directly providing post-discharge instructions  Additional time then spent on discharge activities  Discharge Medications:  See after visit summary for reconciled discharge medications provided to patient and/or family        **Please Note: This note may have been constructed using a voice recognition system**

## 2022-09-12 NOTE — UTILIZATION REVIEW
Inpatient Admission Authorization Request   NOTIFICATION OF INPATIENT ADMISSION/INPATIENT AUTHORIZATION REQUEST   SERVICING FACILITY:   [facility] [address]   [address] [place] [address], [id number] [place] [address]  Tax ID: [id number]  NPI: [id number]  Place of Service: Inpatient [address]  Place of Service Code: 24     ATTENDING PROVIDER:  Attending Name and NPI#: Abdi Jasmine Md [[id number]]  Address: [address] [place] [address], [address] [place] [address]  Phone:  [phone]     UTILIZATION REVIEW CONTACT:  Salvador Benitez, Utilization Review Supervisor  Network Utilization Review Department  Phone: [phone]  Fax: [phone]  Email: [email or web address]     PHYSICIAN ADVISORY SERVICES:  FOR [id number] REVIEW - MEDICAL NECESSITY DENIAL  Phone: [phone]  Fax: [phone]  Email: [email or web address]     TYPE OF REQUEST:  Inpatient Status     ADMISSION INFORMATION:  ADMISSION DATE/TIME: 9/10/22 12:17 AM  PATIENT DIAGNOSIS CODE/DESCRIPTION:  Chest pain [R07 9]  Pulmonary embolism and infarction (HCC) [I26 99]  SOB (shortness of breath) [R06 02]  DISCHARGE DATE/TIME: No discharge date for patient encounter  IMPORTANT INFORMATION:  Please contact Mary Turk directly with any questions or concerns regarding this request  Department voicemails are confidential     Send requests for admission clinical reviews, concurrent reviews, approvals, and administrative denials due to lack of clinical to fax [phone]

## 2022-09-12 NOTE — PLAN OF CARE
Problem: MOBILITY - ADULT  Goal: Maintain or return to baseline ADL function  Description: INTERVENTIONS:  -  Assess patient's ability to carry out ADLs; assess patient's baseline for ADL function and identify physical deficits which impact ability to perform ADLs (bathing, care of mouth/teeth, toileting, grooming, dressing, etc )  - Assess/evaluate cause of self-care deficits   - Assess range of motion  - Assess patient's mobility; develop plan if impaired  - Assess patient's need for assistive devices and provide as appropriate  - Encourage maximum independence but intervene and supervise when necessary  - Involve family in performance of ADLs  - Assess for home care needs following discharge   - Consider OT consult to assist with ADL evaluation and planning for discharge  - Provide patient education as appropriate  Outcome: Progressing     Problem: CARDIOVASCULAR - ADULT  Goal: Maintains optimal cardiac output and hemodynamic stability  Description: INTERVENTIONS:  - Monitor I/O, vital signs and rhythm  - Monitor for S/S and trends of decreased cardiac output  - Administer and titrate ordered vasoactive medications to optimize hemodynamic stability  - Assess quality of pulses, skin color and temperature  - Assess for signs of decreased coronary artery perfusion  - Instruct patient to report change in severity of symptoms  Outcome: Progressing     Problem: RESPIRATORY - ADULT  Goal: Achieves optimal ventilation and oxygenation  Description: INTERVENTIONS:  - Assess for changes in respiratory status  - Assess for changes in mentation and behavior  - Position to facilitate oxygenation and minimize respiratory effort  - Oxygen administered by appropriate delivery if ordered  - Initiate smoking cessation education as indicated  - Encourage broncho-pulmonary hygiene including cough, deep breathe, Incentive Spirometry  - Assess the need for suctioning and aspirate as needed  - Assess and instruct to report SOB or any respiratory difficulty  - Respiratory Therapy support as indicated  Outcome: Progressing     Problem: Potential for Falls  Goal: Patient will remain free of falls  Description: INTERVENTIONS:  - Educate patient/family on patient safety including physical limitations  - Instruct patient to call for assistance with activity   - Consult OT/PT to assist with strengthening/mobility   - Keep Call bell within reach  - Keep bed low and locked with side rails adjusted as appropriate  - Keep care items and personal belongings within reach  - Initiate and maintain comfort rounds  - Make Fall Risk Sign visible to staff  - Offer Toileting every 3 Hours, in advance of need  - Initiate/Maintain bedalarm  - Obtain necessary fall risk management equipment:   - Apply yellow socks and bracelet for high fall risk patients  - Consider moving patient to room near nurses station  Outcome: Progressing

## 2022-09-12 NOTE — DISCHARGE INSTR - AVS FIRST PAGE
You were treated and evaluated for pulmonary embolism (PE) or blood clot in your lungs  At this time it is unclear what the cause of your PE was  Your echocardiogram or ultrasound of your heart was normal   You will need to continue with anticoagulation with Eliquis  Would avoid NSAIDs like ibuprofen, Advil, Motrin, naproxen, Aleve and aspirin, as this can increase your risk of bleeding while on a blood thinner  You will need to follow-up with hematology outpatient, we had obtained a thrombosis or hypercoagulability panel while inpatient, results likely will not return for a few days  Will give script to have lower extremity ultrasound done to rule out blood clot in your legs  Your treatment would not change regardless  You can continue using lidocaine patches and Tylenol as needed for pain  Would also recommend you continue following up with GI outpatient given your history of elevated liver function testing  There is possibility you may have underlying inflammatory disorder that could have predisposed you to clotting  Obtain blood work (hepatic function panel) in 1 week to monitor liver enzymes

## 2022-09-12 NOTE — ASSESSMENT & PLAN NOTE
Patient presented with pleuritic chest pain and SOB  Denies hx of DVT/PE, recent travel/immbolization/surgery, known personal/family history of clotting disorders  Not active smoker, no exogenous estrogen usuage  Hx obesity  · CTA c/a/p: filling defect in right lower lobe pulmonary arterial branch suspicious for PE  No discrete evidence of right heart strain  Ill-defined opacity in right lower lobe, could refer represent developing pulmonary infarct  Small dependent right pleural effusion  · D-dimer: 1 82  · Likely secondary to poor mobility/sedentary state  · Previous provider discussed with pharmacy; recent literature does not demonstrate decreased efficacy of NOAC in regards to obesity  · ECHO: EF 35%, normal systolic and diastolic function  No pulmonary hypertension  · C/w Eliquis, price check per pharmacy, patient agreeable     · Thrombosis panel obtained, results pending  · Will give script to obtain outpatient bilateral lower extremity venous duplex to r/o DVT  · Will give referral to follow-up with hematology outpatient  · Discharge with lidocaine patches and Tylenol PRN, avoid NSAIDs

## 2022-09-13 ENCOUNTER — OFFICE VISIT (OUTPATIENT)
Dept: FAMILY MEDICINE CLINIC | Facility: CLINIC | Age: 57
End: 2022-09-13
Payer: COMMERCIAL

## 2022-09-13 VITALS
DIASTOLIC BLOOD PRESSURE: 72 MMHG | WEIGHT: 293 LBS | TEMPERATURE: 97.2 F | HEART RATE: 70 BPM | RESPIRATION RATE: 16 BRPM | SYSTOLIC BLOOD PRESSURE: 116 MMHG | HEIGHT: 63 IN | OXYGEN SATURATION: 97 % | BODY MASS INDEX: 51.91 KG/M2

## 2022-09-13 DIAGNOSIS — Z86.711 HX PULMONARY EMBOLISM: ICD-10-CM

## 2022-09-13 DIAGNOSIS — R79.89 D-DIMER, ELEVATED: ICD-10-CM

## 2022-09-13 DIAGNOSIS — Z76.89 ENCOUNTER FOR SUPPORT AND COORDINATION OF TRANSITION OF CARE: Primary | ICD-10-CM

## 2022-09-13 DIAGNOSIS — K21.9 GASTROESOPHAGEAL REFLUX DISEASE WITHOUT ESOPHAGITIS: ICD-10-CM

## 2022-09-13 LAB
APTT SCREEN TO CONFIRM RATIO: 0.81 RATIO (ref 0–1.34)
B2 GLYCOPROT1 IGA SERPL IA-ACNC: 2.5
B2 GLYCOPROT1 IGG SERPL IA-ACNC: 5.8
B2 GLYCOPROT1 IGM SERPL IA-ACNC: <2.9
CARDIOLIPIN IGA SER IA-ACNC: 4.6
CARDIOLIPIN IGG SER IA-ACNC: 1.9
CARDIOLIPIN IGM SER IA-ACNC: 1.1
CONFIRM APTT/NORMAL: 43.3 SEC (ref 0–47.6)
LA PPP-IMP: NORMAL
PROT S ACT/NOR PPP: 88 % (ref 61–136)
PROT S PPP-ACNC: 97 % (ref 60–150)
SCREEN APTT: 46.5 SEC (ref 0–51.9)
SCREEN DRVVT: 38.7 SEC (ref 0–47)
THROMBIN TIME: 18.8 SEC (ref 0–23)

## 2022-09-13 PROCEDURE — 99495 TRANSJ CARE MGMT MOD F2F 14D: CPT | Performed by: NURSE PRACTITIONER

## 2022-09-13 RX ORDER — OMEPRAZOLE 20 MG/1
20 CAPSULE, DELAYED RELEASE ORAL DAILY
Qty: 90 CAPSULE | Refills: 1 | Status: SHIPPED | OUTPATIENT
Start: 2022-09-13

## 2022-09-13 RX ORDER — BUPROPION HYDROCHLORIDE 100 MG/1
100 TABLET, EXTENDED RELEASE ORAL DAILY
COMMUNITY
Start: 2022-09-06

## 2022-09-13 NOTE — UTILIZATION REVIEW
Notification of Discharge   This is a Notification of Discharge from our facility [address]  Please be advised that this patient has been discharge from our facility  Below you will find the admission and discharge date and time including the patients disposition  UTILIZATION REVIEW CONTACT:  Rhonda Turk  Utilization   Network Utilization Review Department  Phone: [phone] x carefully listen to the prompts  All voicemails are confidential   Email: [email or web address]     PHYSICIAN ADVISORY SERVICES:  FOR BUOM-PT-KQRE REVIEW - MEDICAL NECESSITY DENIAL  Phone: [phone]  Fax: [phone]  Email: [email or web address]     PRESENTATION DATE: 9/9/2022  8:45 PM  OBERVATION ADMISSION DATE:   INPATIENT ADMISSION DATE: 9/10/22 12:17 AM   DISCHARGE DATE: 9/12/2022  2:39 PM  DISPOSITION: Home/Self Care Home/Self Care      IMPORTANT INFORMATION:  Send all requests for admission clinical reviews, approved or denied determinations and any other requests to dedicated fax number below belonging to the campus where the patient is receiving treatment   List of dedicated fax numbers:  [address] [place] [address] DENIALS (Administrative/Medical Necessity) [phone]   [address] (Maternity/NICU/Pediatrics) [phone]   [facility] [phone]   [address] [facility] [phone]   [address] [place] [address] [phone]   [address] [place] [address],[address] [phone]   [facility]  [phone]   [address] [place] [address], [facility]  [address] [facility] [address] [facility] [phone]

## 2022-09-13 NOTE — PROGRESS NOTES
Name: Sade Nicole      : 1965      MRN: [id number]  Encounter Provider: APPLE Shah  Encounter Date: 2022   Encounter department: [address] [place] [address] Place     1  Encounter for support and coordination of transition of care    2  Hx pulmonary embolism    Physical assessment unremarkable on patient is doing quite well she denies any need for any home health care any visiting nurses due to recent hospitalization  She does not have an established a relationship or an appointment with Hematology advised will get that appointment for her  She is advised to continue to take the medication prescribed by the emergency room doctor for clot prophylaxis as prescribed  Patient does have an upcoming appointment with Gastroenterology to discuss the enlarged liver possibly abnormal liver enzymes  Patient had been taking large amounts of Tylenol for knee pain did advised that she stop doing this we also scheduled a vascular study for her will contact her with results if the ordering provider does not  Otherwise follow-up as needed return to office as indicated seek emergency care if symptoms worsen or return  TCM Call     Date and time call was made  2022  Addis [address] [place] [address] reviewed  Records reviewed    Patient was hospitialized at  [address]    Date of Admission  22    Date of discharge  22    Diagnosis  Pulmonary embolus    Disposition  Home    Were the patients medications reviewed and updated  Yes    Current Symptoms  None      TCM Call     Post hospital issues  None    Should patient be enrolled in anticoag monitoring? Yes    Scheduled for follow up?   No    Referrals needed  Cardiology    Did you obtain your prescribed medications  Yes    Do you need help managing your prescriptions or medications  No    Is transportation to your appointment needed  No    I have advised the patient to call PCP with any new or worsening symptoms [address]Margaret[address]  Family; Friends    The type of support provided  Emotional; Physical    Do you have social support  Yes, as much as I need    Are you recieving any outpatient services  No    Are you recieving home care services  No    Are you using any community resources  No    Current waiver services  No    Have you fallen in the last 12 months  Yes    How many times  1    Interperter language line needed  No          Subjective      Patient is here for TCM for recent hospitalization for pulmonary emboli  Patient is doing quite well a has no home health or visiting nurses coming to her home at this time  Review of Systems   Constitutional: Negative for appetite change and fever  HENT: Negative for sinus pressure and sore throat  Eyes: Negative for pain  Respiratory: Negative for shortness of breath  Cardiovascular: Negative for chest pain  Gastrointestinal: Negative for abdominal pain  Genitourinary: Negative for dysuria  Musculoskeletal: Negative for arthralgias and myalgias  Skin: Negative for color change  Neurological: Negative for light-headedness  Psychiatric/Behavioral: Negative for behavioral problems  Current Outpatient Medications on File Prior to Visit   Medication Sig    acetaminophen (TYLENOL) 325 mg tablet Take 2 tablets (650 mg total) by mouth every 6 (six) hours as needed for mild pain    apixaban (Eliquis) 5 mg Take 2 tablets (10 mg total) by mouth 2 (two) times a day for 7 days, THEN 1 tablet (5 mg total) 2 (two) times a day for 23 days   Further dosing will need to be prescribed by PCP for 3-6 months total     buPROPion (WELLBUTRIN SR) 100 mg 12 hr tablet Take 100 mg by mouth daily    DULoxetine (CYMBALTA) 60 mg delayed release capsule Take 60 mg by mouth daily    levothyroxine 50 mcg tablet Take 1 tablet (50 mcg total) by mouth daily in the early morning    lidocaine (LIDODERM) 5 % Apply 2 patches topically daily for 10 days Remove & Discard patch within 12 hours or as directed by MD    albuterol (2 5 mg/3 mL) 0 083 % nebulizer solution Inhale 1 each (Patient not taking: No sig reported)    albuterol (PROVENTIL HFA,VENTOLIN HFA) 90 mcg/act inhaler Inhale 2 puffs every 6 (six) hours as needed for wheezing or shortness of breath (Patient not taking: No sig reported)    omeprazole (PriLOSEC) 20 mg delayed release capsule Take 20 mg by mouth daily (Patient not taking: No sig reported)       Objective     /72 (BP Location: Left arm, Patient Position: Sitting, Cuff Size: Large)   Pulse 70   Temp (!) 97 2 °F (36 2 °C) (Temporal)   Resp 16   Ht 5' 3" (1 6 m)   Wt (!) 159 kg (350 lb)   SpO2 97%   BMI 62 00 kg/m²     Physical Exam  Vitals and nursing note reviewed  Constitutional:       General: She is not in acute distress  Appearance: She is well-developed  She is obese  She is not diaphoretic  HENT:      Head: Normocephalic and atraumatic  Right Ear: External ear normal       Left Ear: External ear normal       Mouth/Throat:      Pharynx: Oropharynx is clear  Eyes:      Pupils: Pupils are equal, round, and reactive to light  Cardiovascular:      Rate and Rhythm: Normal rate and regular rhythm  Heart sounds: Normal heart sounds  Pulmonary:      Effort: Pulmonary effort is normal       Breath sounds: Normal breath sounds  Abdominal:      Palpations: Abdomen is soft  Musculoskeletal:         General: Tenderness (Left knee known problem related to morbid obesity or degeneration of knee ) present  Skin:     General: Skin is dry  Neurological:      General: No focal deficit present  Mental Status: She is alert and oriented to person, place, and time  Psychiatric:         Behavior: Behavior normal          Thought Content:  Thought content normal        APPLE [place]

## 2022-09-15 LAB
PROT C AG ACT/NOR PPP IA: 60 % OF NORMAL (ref 60–150)
PROT S ACT/NOR PPP: 60 % (ref 68–108)

## 2022-09-16 ENCOUNTER — HOSPITAL ENCOUNTER (OUTPATIENT)
Dept: VASCULAR ULTRASOUND | Facility: HOSPITAL | Age: 57
Discharge: HOME | End: 2022-09-16
Payer: COMMERCIAL

## 2022-09-16 DIAGNOSIS — I26.94 MULTIPLE SUBSEGMENTAL PULMONARY EMBOLI WITHOUT ACUTE COR PULMONALE (HCC): ICD-10-CM

## 2022-09-16 LAB
F2 GENE MUT ANL BLD/T: ABNORMAL
F5 GENE MUT ANL BLD/T: NORMAL

## 2022-09-16 PROCEDURE — 93970 EXTREMITY STUDY: CPT | Performed by: SURGERY

## 2022-09-16 PROCEDURE — 93970 EXTREMITY STUDY: CPT

## 2022-09-19 ENCOUNTER — APPOINTMENT (OUTPATIENT)
Dept: LAB | Facility: MEDICAL CENTER | Age: 57
End: 2022-09-19
Payer: COMMERCIAL

## 2022-09-19 DIAGNOSIS — R74.01 TRANSAMINITIS: ICD-10-CM

## 2022-09-19 LAB
ALBUMIN SERPL BCP-MCNC: 4 G/DL (ref 3.5–5)
ALP SERPL-CCNC: 91 U/L (ref 46–116)
ALT SERPL W P-5'-P-CCNC: 51 U/L (ref 12–78)
AST SERPL W P-5'-P-CCNC: 23 U/L (ref 5–45)
BILIRUB DIRECT SERPL-MCNC: 0.12 MG/DL (ref 0–0.2)
BILIRUB SERPL-MCNC: 0.44 MG/DL (ref 0.2–1)
PROT SERPL-MCNC: 8.1 G/DL (ref 6.4–8.4)

## 2022-09-19 PROCEDURE — 80076 HEPATIC FUNCTION PANEL: CPT

## 2022-09-19 PROCEDURE — 36415 COLL VENOUS BLD VENIPUNCTURE: CPT

## 2022-10-03 ENCOUNTER — TELEPHONE (OUTPATIENT)
Dept: GASTROENTEROLOGY | Facility: CLINIC | Age: 57
End: 2022-10-03

## 2022-10-03 ENCOUNTER — CONSULT (OUTPATIENT)
Dept: HEMATOLOGY ONCOLOGY | Facility: CLINIC | Age: 57
End: 2022-10-03
Payer: COMMERCIAL

## 2022-10-03 VITALS
BODY MASS INDEX: 51.91 KG/M2 | HEIGHT: 63 IN | RESPIRATION RATE: 20 BRPM | WEIGHT: 293 LBS | OXYGEN SATURATION: 98 % | DIASTOLIC BLOOD PRESSURE: 70 MMHG | SYSTOLIC BLOOD PRESSURE: 112 MMHG | HEART RATE: 78 BPM | TEMPERATURE: 97.5 F

## 2022-10-03 DIAGNOSIS — D68.52 PROTHROMBIN GENE MUTATION (HCC): ICD-10-CM

## 2022-10-03 DIAGNOSIS — Z86.711 HX PULMONARY EMBOLISM: ICD-10-CM

## 2022-10-03 DIAGNOSIS — R79.89 D-DIMER, ELEVATED: ICD-10-CM

## 2022-10-03 DIAGNOSIS — D68.59 PROTEIN C DEFICIENCY (HCC): Primary | ICD-10-CM

## 2022-10-03 DIAGNOSIS — D68.59 PROTEIN S DEFICIENCY (HCC): ICD-10-CM

## 2022-10-03 DIAGNOSIS — I26.94 MULTIPLE SUBSEGMENTAL PULMONARY EMBOLI WITHOUT ACUTE COR PULMONALE (HCC): ICD-10-CM

## 2022-10-03 PROCEDURE — 99244 OFF/OP CNSLTJ NEW/EST MOD 40: CPT | Performed by: PHYSICIAN ASSISTANT
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
## 2022-10-03 NOTE — TELEPHONE ENCOUNTER
Due to [facility] out sick , Maria T's appt for 10/4/22 are canx  Lmm for pt to call back and r/s appt   sb

## 2022-10-13 DIAGNOSIS — I26.99 PULMONARY EMBOLISM AND INFARCTION (HCC): ICD-10-CM

## 2022-10-14 DIAGNOSIS — I26.99 PE (PULMONARY THROMBOEMBOLISM) (HCC): Primary | ICD-10-CM

## 2022-11-08 ENCOUNTER — OFFICE VISIT (OUTPATIENT)
Dept: GASTROENTEROLOGY | Facility: CLINIC | Age: 57
End: 2022-11-08

## 2022-11-08 VITALS
BODY MASS INDEX: 51.91 KG/M2 | DIASTOLIC BLOOD PRESSURE: 79 MMHG | HEART RATE: 82 BPM | HEIGHT: 63 IN | WEIGHT: 293 LBS | SYSTOLIC BLOOD PRESSURE: 136 MMHG

## 2022-11-08 DIAGNOSIS — R74.8 ELEVATED LIVER ENZYMES: ICD-10-CM

## 2022-11-08 DIAGNOSIS — M19.90 ARTHRITIS: Primary | ICD-10-CM

## 2022-11-08 DIAGNOSIS — R21 RASH AND NONSPECIFIC SKIN ERUPTION: ICD-10-CM

## 2022-11-08 DIAGNOSIS — R76.8 POSITIVE ANA (ANTINUCLEAR ANTIBODY): ICD-10-CM

## 2022-11-08 DIAGNOSIS — K76.0 FATTY LIVER: ICD-10-CM
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
## 2022-11-29 ENCOUNTER — TELEMEDICINE (OUTPATIENT)
Dept: FAMILY MEDICINE CLINIC | Facility: CLINIC | Age: 57
End: 2022-11-29

## 2022-11-29 DIAGNOSIS — E03.8 OTHER SPECIFIED HYPOTHYROIDISM: ICD-10-CM

## 2022-11-29 DIAGNOSIS — J06.9 URI WITH COUGH AND CONGESTION: Primary | ICD-10-CM

## 2022-11-29 DIAGNOSIS — E01.0 THYROMEGALY: ICD-10-CM

## 2022-11-29 DIAGNOSIS — I26.99 PE (PULMONARY THROMBOEMBOLISM) (HCC): ICD-10-CM

## 2022-11-29 RX ORDER — BENZONATATE 100 MG/1
100 CAPSULE ORAL 3 TIMES DAILY PRN
Qty: 60 CAPSULE | Refills: 0 | Status: SHIPPED | OUTPATIENT
Start: 2022-11-29

## 2022-11-29 RX ORDER — AZITHROMYCIN 250 MG/1
TABLET, FILM COATED ORAL
Qty: 6 TABLET | Refills: 0 | Status: SHIPPED | OUTPATIENT
Start: 2022-11-29 | End: 2022-12-04

## 2022-11-29 NOTE — PROGRESS NOTES
Virtual Brief Visit    Patient is located in the following state in which I hold an active license [place]      Assessment/Plan:  Patient is being seen today via telephone virtual visit  Upper respiratory she feels she has turned a corner however feels she would benefit from something for cough and she is having a lot of sinus pressure supple treat her for sinusitis secondary to URI azithromycin sent to the pharmacy she has taken in the past a well tolerated return to office as needed or if symptoms become significantly worse any shortness of breath seek emergency care  Dosing all possible side effects of the prescribed medications or medications that had been prescribed in the past were reviewed and all questions were answered  Patient verbalized agreement and understanding of the plan of care as outlined during the office visit today return to office as indicated or sooner if a problem arises  Problem List Items Addressed This Visit    None  Visit Diagnoses     URI with cough and congestion    -  Primary    Relevant Medications    azithromycin (Zithromax) 250 mg tablet    benzonatate (TESSALON PERLES) 100 mg capsule          Recent Visits  No visits were found meeting these conditions  Showing recent visits within past 7 days and meeting all other requirements  Today's Visits  Date Type Provider Dept   11/29/22 [address] [place] [address], 1400 [facility]   Showing today's visits and meeting all other requirements  Future Appointments  No visits were found meeting these conditions    Showing future appointments within next 150 days and meeting all other requirements         Visit Time    Visit Start Time: 9956  Visit Stop Time: 9125  Total Visit Duration: 10 minutes

## 2022-11-30 RX ORDER — APIXABAN 5 MG/1
TABLET, FILM COATED ORAL
Qty: 30 TABLET | Refills: 1 | Status: SHIPPED | OUTPATIENT
Start: 2022-11-30

## 2022-11-30 RX ORDER — LEVOTHYROXINE SODIUM 0.05 MG/1
50 TABLET ORAL
Qty: 90 TABLET | Refills: 1 | Status: SHIPPED | OUTPATIENT
Start: 2022-11-30

## 2022-12-20 ENCOUNTER — OFFICE VISIT (OUTPATIENT)
Dept: OBGYN CLINIC | Facility: MEDICAL CENTER | Age: 57
End: 2022-12-20

## 2022-12-20 ENCOUNTER — APPOINTMENT (OUTPATIENT)
Dept: RADIOLOGY | Facility: MEDICAL CENTER | Age: 57
End: 2022-12-20

## 2022-12-20 VITALS
WEIGHT: 293 LBS | HEART RATE: 79 BPM | SYSTOLIC BLOOD PRESSURE: 153 MMHG | HEIGHT: 63 IN | BODY MASS INDEX: 51.91 KG/M2 | DIASTOLIC BLOOD PRESSURE: 66 MMHG

## 2022-12-20 DIAGNOSIS — M25.562 PAIN IN BOTH KNEES, UNSPECIFIED CHRONICITY: ICD-10-CM

## 2022-12-20 DIAGNOSIS — M17.0 PRIMARY OSTEOARTHRITIS OF BOTH KNEES: ICD-10-CM

## 2022-12-20 DIAGNOSIS — M25.561 PAIN IN BOTH KNEES, UNSPECIFIED CHRONICITY: ICD-10-CM

## 2022-12-20 DIAGNOSIS — M25.561 CHRONIC PAIN OF BOTH KNEES: Primary | ICD-10-CM

## 2022-12-20 DIAGNOSIS — G89.29 CHRONIC PAIN OF BOTH KNEES: Primary | ICD-10-CM

## 2022-12-20 DIAGNOSIS — M25.562 CHRONIC PAIN OF BOTH KNEES: Primary | ICD-10-CM

## 2022-12-20 DIAGNOSIS — E66.01 MORBID OBESITY WITH BMI OF 60.0-69.9, ADULT (HCC): ICD-10-CM

## 2022-12-20 NOTE — PATIENT INSTRUCTIONS
Steroid Joint Injection   AMBULATORY CARE:   What you need to know about steroid joint injection:  A steroid joint injection is a procedure to inject steroid medicine into a joint  Steroid medicine decreases pain and inflammation  The injection may also contain an anesthetic (numbing medicine) to decrease pain  It may be done to treat conditions such as arthritis, gout, or carpal tunnel syndrome  The injections may be given in your knee, ankle, shoulder, elbow, wrist, or ankle  How to prepare for steroid joint injection:  Your healthcare provider will talk to you about how to prepare for this procedure  He will tell you what medicines to take or not take on the day of your procedure  You may need to stop taking blood thinners several days before your procedure  What will happen during steroid joint injection:  You may be given local anesthesia to numb the area where the injection will be given  With local anesthesia, you may still feel pressure during the procedure, but you should not feel any pain  Your healthcare provider may use ultrasound or fluoroscopy (a type of x-ray) to guide the needle to the right area  He will then inject the steroid into your joint  A bandage will be placed on the injection site  What will happen after steroid joint injection:  You may have redness, warmth, or sweating in your face and chest right after the steroid injection  Steroids can affect blood sugar levels  If you have diabetes, you should check your blood sugars closely in the first 24 hours after your procedure  Risks of steroid joint injection:  You may get an infection in your joint  The injection may also cause more pain during the first 24 to 36 hours  You may need more than one injection to feel pain relief  The skin near the injection site may be damaged and become discolored or indented  This can happen if the steroid is placed too close to your skin   A tendon near the injection site may rupture or a nerve can be damaged  Contact your healthcare provider if:   You have fever or chills  You have redness or swelling in the injection site  You have more pain than usual in your joint for more than 72 hours  You have questions or concerns about your condition or care  Medicines:   Pain medicine  may be given  Ask how to take this medicine safely  Take your medicine as directed  Contact your healthcare provider if you think your medicine is not helping or if you have side effects  Tell him or her if you are allergic to any medicine  Keep a list of the medicines, vitamins, and herbs you take  Include the amounts, and when and why you take them  Bring the list or the pill bottles to follow-up visits  Carry your medicine list with you in case of an emergency  Self-care:   Leave the bandage on for 8 to 12 hours  Care for your wound as directed  Rest the area  as directed  You may need to decrease weight on certain joints, such as the knee, for a period of time  Ask when you can return to your daily activities  Elevate  your limb where the steroid injection was given  Elevate the limb above the level of your heart as often as you can  This will help decrease swelling and pain  Prop your limb on pillows or blankets to keep it elevated comfortably  Apply ice  on your joint for 15 to 20 minutes every hour or as directed  Use an ice pack, or put crushed ice in a plastic bag  Cover it with a towel  Ice helps prevent tissue damage and decreases swelling and pain  Follow up with your healthcare provider as directed:  Write down your questions so you remember to ask them during your visits  © 2017 [address] [employer] Information is for End User's use only and may not be sold, redistributed or otherwise used for commercial purposes  All illustrations and images included in CareNotes® are the copyrighted property of [employer]  or [facility]    The above information is an  only  It is not intended as medical advice for individual conditions or treatments  Talk to your doctor, nurse or pharmacist before following any medical regimen to see if it is safe and effective for you  Arthritis   AMBULATORY CARE:   Arthritis  is a disease that causes inflammation in one or more joints  There are many types of arthritis, such as osteoarthritis, rheumatoid arthritis, and septic arthritis  Some types cause inflammation in the joints  Other types wear away the cartilage between joints  This makes the bones of the joint rub together when you move the joint  Your symptoms may be constant, or symptoms may come and go  Arthritis often gets worse over time and can cause permanent joint damage  Common signs and symptoms of arthritis:   Pain, swelling, or stiffness in the joint    Limited range of motion in the joint    Warmth or redness over the joint    Tenderness when you touch the joint    Stiff joints in the morning that loosen with movement    A creaking or grinding sound when you move the joint    Fever  Seek care immediately if:   You have a fever and severe joint pain or swelling  You cannot move the affected joint  You have severe joint pain you cannot tolerate  Contact your healthcare provider if:   Your pain or swelling does not get better with treatment  You have questions or concerns about your condition or care  Treatment  will depend on the type of arthritis you have and if it is severe  You may need any of the following:  Acetaminophen  decreases pain and fever  It is available without a doctor's order  Ask how much to take and how often to take it  Follow directions  Acetaminophen can cause liver damage if not taken correctly  NSAIDs , such as ibuprofen, help decrease swelling, pain, and fever  This medicine is available with or without a doctor's order  NSAIDs can cause stomach bleeding or kidney problems in certain people   If you take blood thinner medicine, always ask your healthcare provider if NSAIDs are safe for you  Always read the medicine label and follow directions  Steroid medicine  helps reduce swelling and pain  Surgery  may be needed to repair or replace a damaged joint  Manage arthritis:   Rest your painful joint so it can heal   Your healthcare provider may recommend crutches or a walker if the affected joint is in a leg  Apply ice or heat to the joint  Both can help decrease swelling and pain  Ice may also help prevent tissue damage  Use an ice pack, or put crushed ice in a plastic bag  Cover it with a towel and place it on your joint for 15 to 20 minutes every hour or as directed  You can apply heat for 20 minutes every 2 hours  Heat treatment includes hot packs or heat lamps  Elevate your joint  Elevation helps reduce swelling and pain  Raise your joint above the level of your heart as often as you can  Prop your painful joint on pillows to keep it above your heart comfortably  Go to physical or occupational therapy as directed  A physical therapist can teach you exercises to improve flexibility and range of motion  You may also be shown non-weight-bearing exercises that are safe for your joints, such as swimming  Exercise can help keep your joints flexible and reduce pain  An occupational therapist can help you learn to do your daily activities when your joints are stiff or sore  Maintain a healthy weight  Extra weight puts increased pressure on your joints  Ask your healthcare provider what you should weigh  If you need to lose weight, he can help you create a weight loss program  Weight loss can help reduce pain and increase your ability to do your activities  The amount of exercise you do may vary each day, depending on your symptoms  Wear flat or low-heeled shoes  This will help decrease pain and reduce pressure on your ankle, knee, and hip joints  Use support devices as directed    You may be given splints to wear on your hands to help your joints rest and to decrease inflammation  While you sleep, use a pillow that is firm enough to support your neck and head  Other equipment  that may help you move and prevent falls:  Orthotic shoes or insoles  help support your feet when you walk  Crutches, a cane, or a walker  may help decrease your risk for falling  They also decrease stress on affected joints  Devices to prevent falls  include raised toilet seats and bathtub bars to help you get up from sitting  Handrails can be placed in areas where you need balance and support  Follow up with your healthcare provider or rheumatologist as directed:  Write down your questions so you remember to ask them during your visits  © 2017 [facility] [facility] Information is for End User's use only and may not be sold, redistributed or otherwise used for commercial purposes  All illustrations and images included in CareNotes® are the copyrighted property of [employer]  or [employer]  The above information is an  only  It is not intended as medical advice for individual conditions or treatments  Talk to your doctor, nurse or pharmacist before following any medical regimen to see if it is safe and effective for you

## 2022-12-20 NOTE — PROGRESS NOTES
Assessment/Plan:    Diagnoses and all orders for this visit:    Chronic pain of both knees  -     XR knee 4+ vw right injury; Future  -     XR knee 4+ vw left injury; Future  -     Ambulatory Referral to Weight Management; Future  -     Ambulatory Referral to Sports Medicine; Future    Primary osteoarthritis of both knees  -     Ambulatory Referral to Orthopedic Surgery  -     XR knee 4+ vw right injury; Future  -     XR knee 4+ vw left injury; Future  -     Ambulatory Referral to Sports Medicine; Future    Morbid obesity with BMI of 60 0-69 9, [facility] ([facility] )  -     Ambulatory Referral to Weight Management; Future    U/S Guided CSI B/L knees with Dr Negrito Abraham  Patient educated on weight loss and BMI requirements for TKAs  She declines PT     Chief Complaint:     Chief Complaint   Patient presents with   • Left Knee - Pain   • Right Knee - Pain       Subjective:   Patient ID: Zeyad Hewitt is a 61 y o  female  Patient presents for chronic bilateral knee pain, she has known osteoarthritis and was told previously by an outside orthopedic surgeon that she would need a knee replacement  She has seen Bariatrics in the past      Review of Systems    The following portions of the patient's chart were reviewed and updated as appropriate:      Allergie  Allergies   Allergen Reactions   • Ciprofloxacin Other (See Comments)     Reaction Date: [id number];      • Penicillins Other (See Comments)   • Advil [Ibuprofen] Hives and Rash   s   Allergen Reactions   • Ciprofloxacin Other (See Comments)     Reaction Date: [id number];      • Penicillins Other (See Comments)   • Advil [Ibuprofen] Hives and Rash      Diagnosis Date   • Anxiety    • Asthma    • Chronic fatigue    • COPD (chronic obstructive pulmonary disease) ([facility])    • Depression    • Fibromyalgia    • Head injury    • Hypothyroid    • Morbid obesity with BMI of 60 0-69 9, adult ([facility])    • Sleep apnea     PT states a very mild case, prescribed CPAP but does not use machine         Past Surgical History:   Procedure Laterality Date   • APPENDECTOMY     •  SECTION     • ESOPHAGOGASTRODUODENOSCOPY     • HERNIA REPAIR      Primary repair of incarcerated umbilical hernia - Dr Edgar Vega    • HYSTERECTOMY      Hysterectomy/revise vagina   • KNEE SURGERY      Arthrotomy with open meniscus repair    • LAPAROSCOPIC CHOLECYSTECTOMY     • TONSILECTOMY AND ADNOIDECTOMY     • TUMOR REMOVAL      head   • URETER SURGERY      repair   • WISDOM TOOTH EXTRACTION         Social History     Socioeconomic History   • Marital status: /Civil Union     Spouse name: Not on file   • Number of children: Not on file   • Years of education: Not on file   • Highest education level: Not on file   Occupational History   • Not on file   Tobacco Use   • Smoking status: Former     Years:      Types: Cigarettes   • Smokeless tobacco: Never   • Tobacco comments:     3= PPD - As per Charito Pittman    Vaping Use   • Vaping Use: Never used   Substance and Sexual Activity   • Alcohol use: Not Currently     Comment: rarely   • Drug use: Not Currently   • Sexual activity: Not Currently     Partners: Male   Other Topics Concern   • Not on file   Social History Narrative    · Most recent tobacco use screenin2018      · Do you currently or have you served in [employer]:   No      · Were you activated, into active duty, as a member of the [employer] or as a Reservist:   No      · Advance directive:   No     - As per Charito Pittman      Social Determinants of Health     Financial Resource Strain: Not on file   Food Insecurity: Not on file   Transportation Needs: Not on file   Physical Activity: Not on file   Stress: Not on file   Social Connections: Not on file   Intimate Partner Violence: Not on file   Housing Stability: Not on file       Family History   Problem Relation Age of Onset   • Diabetes Family    • Heart disease Family    • Depression Mother    • Anxiety disorder Mother    • No Known Problems Father    • No Known Problems Sister    • Diabetes Brother    • Hypertension Brother    • No Known Problems Son    • Breast cancer Paternal Grandmother    • Esophageal cancer Paternal Grandfather    • Skin cancer Paternal Grandfather    • No Known Problems Maternal Grandmother    • No Known Problems Maternal Grandfather    • Stroke Neg Hx    • Thyroid disease Neg Hx        Medications:    Current Outpatient Medications:   •  acetaminophen (TYLENOL) 325 mg tablet, Take 2 tablets (650 mg total) by mouth every 6 (six) hours as needed for mild pain, Disp: 30 tablet, Rfl: 0  •  albuterol (2 5 mg/3 mL) 0 083 % nebulizer solution, Inhale 1 each, Disp: , Rfl:   •  albuterol (PROVENTIL HFA,VENTOLIN HFA) 90 mcg/act inhaler, Inhale 2 puffs every 6 (six) hours as needed for wheezing or shortness of breath, Disp: 2 Inhaler, Rfl: 1  •  apixaban (Eliquis) 5 mg, Take 2 tablets (10 mg total) by mouth 2 (two) times a day for 7 days, THEN 1 tablet (5 mg total) 2 (two) times a day for 23 days   Further dosing will need to be prescribed by PCP for 3-6 months total , Disp: 74 tablet, Rfl: 0  •  benzonatate (TESSALON PERLES) 100 mg capsule, Take 1 capsule (100 mg total) by mouth 3 (three) times a day as needed for cough, Disp: 60 capsule, Rfl: 0  •  buPROPion (WELLBUTRIN SR) 100 mg 12 hr tablet, Take 100 mg by mouth daily, Disp: , Rfl:   •  DULoxetine (CYMBALTA) 60 mg delayed release capsule, Take 60 mg by mouth daily, Disp: , Rfl:   •  Eliquis 5 MG, TAKE 1 TABLET BY MOUTH TWICE A DAY, Disp: 30 tablet, Rfl: 1  •  levothyroxine 50 mcg tablet, TAKE 1 TABLET (50 MCG TOTAL) BY MOUTH DAILY IN THE EARLY MORNING, Disp: 90 tablet, Rfl: 1  •  omeprazole (PriLOSEC) 20 mg delayed release capsule, Take 1 capsule (20 mg total) by mouth daily, Disp: 90 capsule, Rfl: 1  •  lidocaine (LIDODERM) 5 %, Apply 2 patches topically daily for 10 days Remove & Discard patch within 12 hours or as directed by MD, Disp: 20 patch, Rfl: 0    Patient Active Problem List   Diagnosis   • Morbid obesity with BMI of 60 0-69 9, adult ([facility] )   • Hypothyroid   • Fibromyalgia   • Depression   • COPD (chronic obstructive pulmonary disease) (HCC)   • Asthma   • Anxiety   • FELICITAS (obstructive sleep apnea)   • GERD (gastroesophageal reflux disease)   • Pulmonary embolus (HCC)   • Transaminitis   • Protein C deficiency (HCC)   •  Heterozygous  Prothrombin gene mutation        Objective:  /66   Pulse 79   Ht 5' 3" (1 6 m)   Wt (!) 163 kg (359 lb)   BMI 63 59 kg/m²     Ortho Exam    Physical Exam      Neurologic Exam    Procedures    I have personally reviewed pertinent films in PACS and my interpretation is Xrays Right and Left knees:  There is diffuse osteoarthritis seen in both knees more severe on the medial compartments where there is bone-on-bone

## 2023-01-05 ENCOUNTER — OFFICE VISIT (OUTPATIENT)
Dept: OBGYN CLINIC | Facility: MEDICAL CENTER | Age: 58
End: 2023-01-05

## 2023-01-05 VITALS
HEIGHT: 63 IN | BODY MASS INDEX: 51.91 KG/M2 | HEART RATE: 94 BPM | SYSTOLIC BLOOD PRESSURE: 136 MMHG | DIASTOLIC BLOOD PRESSURE: 86 MMHG | WEIGHT: 293 LBS

## 2023-01-05 DIAGNOSIS — M17.12 PRIMARY OSTEOARTHRITIS OF LEFT KNEE: Primary | ICD-10-CM

## 2023-01-05 DIAGNOSIS — E66.01 MORBID OBESITY WITH BMI OF 60.0-69.9, ADULT (HCC): ICD-10-CM

## 2023-01-05 RX ORDER — TRIAMCINOLONE ACETONIDE 40 MG/ML
80 INJECTION, SUSPENSION INTRA-ARTICULAR; INTRAMUSCULAR
Status: COMPLETED | OUTPATIENT
Start: 2023-01-05 | End: 2023-01-05

## 2023-01-05 RX ORDER — ROPIVACAINE HYDROCHLORIDE 5 MG/ML
10 INJECTION, SOLUTION EPIDURAL; INFILTRATION; PERINEURAL
Status: COMPLETED | OUTPATIENT
Start: 2023-01-05 | End: 2023-01-05

## 2023-01-05 RX ADMIN — ROPIVACAINE HYDROCHLORIDE 10 ML: 5 INJECTION, SOLUTION EPIDURAL; INFILTRATION; PERINEURAL at 08:42

## 2023-01-05 RX ADMIN — TRIAMCINOLONE ACETONIDE 80 MG: 40 INJECTION, SUSPENSION INTRA-ARTICULAR; INTRAMUSCULAR at 08:42

## 2023-01-05 NOTE — PROGRESS NOTES
1  Primary osteoarthritis of left knee  Large joint arthrocentesis: L knee      2  Morbid obesity with BMI of 60 0-69 9, adult ([facility] )          Orders Placed This Encounter   Procedures   • Large joint arthrocentesis: L knee        Impression: This is a patient referred by Dr Kendra He with left knee osteoarthritis  The patient is a good candidate for ultrasound guided left knee IA injection due to suboptimal body habitus  Please see procedure note below  Patient tolerated the procedure and had immediate relief of symptoms  Can consider repeat injection in 3 months if patient does well  Imaging Studies (I personally reviewed images in PACS and report):  Left knee x-rays most recent to this encounter reviewed  These images show severe tricompartmental osteoarthritis  Return if symptoms worsen or fail to improve  Patient is in agreement with the above plan  HPI:  Agustin Brunner is a 61 y o  female  who presents for evaluation of   Chief Complaint   Patient presents with   • Left Knee - Pain       History of present illness from referring clinician's notes reviewed  Chronic pain in the knees  She is referred here for ultrasound-guided knee injection  Following history reviewed and updated:  Past Medical History:   Diagnosis Date   • Anxiety    • Asthma    • Chronic fatigue    • COPD (chronic obstructive pulmonary disease) ([facility])    • Depression    • Fibromyalgia    • Head injury    • Hypothyroid    • Morbid obesity with BMI of 60 0-69 9, adult ([facility])    • Sleep apnea     PT states a very mild case, prescribed CPAP but does not use machine       Past Surgical History:   Procedure Laterality Date   • APPENDECTOMY     •  SECTION     • ESOPHAGOGASTRODUODENOSCOPY     • HERNIA REPAIR      Primary repair of incarcerated umbilical hernia - Dr Tamanna Dhaliwal    • HYSTERECTOMY      Hysterectomy/revise vagina   • KNEE SURGERY      Arthrotomy with open meniscus repair    • LAPAROSCOPIC CHOLECYSTECTOMY     • TONSILECTOMY AND ADNOIDECTOMY     • TUMOR REMOVAL      head   • URETER SURGERY      repair   • WISDOM TOOTH EXTRACTION       Social History   Social History     Substance and Sexual Activity   Alcohol Use Not Currently    Comment: rarely     Social History     Substance and Sexual Activity   Drug Use Not Currently     Social History     Tobacco Use   Smoking Status Former   • Years: 23 49   • Types: Cigarettes   Smokeless Tobacco Never   Tobacco Comments    3= PPD - As per [place]      Family History   Problem Relation Age of Onset   • Diabetes Family    • Heart disease Family    • Depression Mother    • Anxiety disorder Mother    • No Known Problems Father    • No Known Problems Sister    • Diabetes Brother    • Hypertension Brother    • No Known Problems Son    • Breast cancer Paternal Grandmother    • Esophageal cancer Paternal Grandfather    • Skin cancer Paternal Grandfather    • No Known Problems Maternal Grandmother    • No Known Problems Maternal Grandfather    • Stroke Neg Hx    • Thyroid disease Neg Hx      Allergies   Allergen Reactions   • Ciprofloxacin Other (See Comments)     Reaction Date: [id number];      • Penicillins Other (See Comments)   • Advil [Ibuprofen] Hives and Rash        Constitutional:  /86   Pulse 94   Ht 5' 3" (1 6 m)   Wt (!) 163 kg (359 lb)   BMI 63 59 kg/m²    General: NAD  Eyes: Anicteric sclerae  Neck: Supple  Lungs: Unlabored breathing  Cardiovascular: No lower extremity edema  Skin: Intact without erythema  Neurologic: Sensation intact to light touch  Psychiatric: Mood and affect are appropriate  Left Knee Exam     Tenderness   Left knee tenderness location: Globally tender  Range of Motion   Extension: normal   Flexion: abnormal     Other   Erythema: absent  Scars: absent  Sensation: normal  Pulse: present    Comments:  Examination is limited  Large joint arthrocentesis: L knee  Universal Protocol:  Consent: Verbal consent obtained   Written consent not obtained  Risks and benefits: risks, benefits and alternatives were discussed  Consent given by: patient  Timeout called at: 1/5/2023 8:40 AM   Patient understanding: patient states understanding of the procedure being performed  Patient consent: the patient's understanding of the procedure matches consent given  Site marked: the operative site was marked  Radiology Images displayed and confirmed  If images not available, report reviewed: imaging studies available  Patient identity confirmed: verbally with patient    Supporting Documentation  Indications: pain   Procedure Details  Location: knee - L knee  Needle size: 22 G  Ultrasound guidance: yes  Approach: Inferolateral to patella  Medications administered: 80 mg triamcinolone acetonide 40 mg/mL; 10 mL ropivacaine 0 5 %    Patient tolerance: patient tolerated the procedure well with no immediate complications  Dressing:  Sterile dressing applied    There was little to no resistance encountered during the injection  Risks of this procedure include:    - Risk of bleeding since a needle is involved  - Risk of infection (1/10,000 chance as per recent studies)  Signs/symptoms were discussed and they would prompt an urgent evaluation at an emergency department   - Risk of pigmentation or skin dimpling in the skin (2-3% chance as per recent studies) from the steroid  - Risk of increased pain from steroid flare (1% chance as per recent studies) that typically lasts 24-48 hours  - Risk of increased blood sugars from the steroid medication that can last for a few weeks  If the patient is a diabetic or pre-diabetic, they were encouraged to closely monitor their blood sugars and discuss with PCP if elevated more than usual or if having symptoms  The benefits outweigh the risks and so the procedure was completed

## 2023-02-01 ENCOUNTER — TELEPHONE (OUTPATIENT)
Dept: HEMATOLOGY ONCOLOGY | Facility: CLINIC | Age: 58
End: 2023-02-01

## 2023-02-01 NOTE — TELEPHONE ENCOUNTER
Appointment Cancellation Or Reschedule     Person calling In self   If someone other than patient calling, are they listed on the communication consent form? self   Provider [address]   Office Visit Date and Time 2/3 3PM   Office Visit Location [facility]   Did patient want to reschedule their office appointment? If so, when was it scheduled to? Yes, 3/6 1PM   Did you have STAR scheduled for this appointment? no   Do you need STAR set up for your new appointment? If yes, [facility] pool for [facility] rescheduling no   Is this patient calling to reschedule an infusion appointment? no   When is their next infusion appointment? no   Is this patient a Chemo patient? no   Reason for Cancellation or Reschedule Not feeling well     If the patient is cancelling an appointment and needs their STAR Transport cancelled, please route to [address]  If the patient is a treatment patient, please route this to the office nurse  If the patient is not on treatment, please route to the Clerical pool based on location  If the patient is a surgical oncology patient, please route to surg/onc clinical pool  Route message as high priority if same day cancellation

## 2023-02-23 ENCOUNTER — APPOINTMENT (OUTPATIENT)
Dept: LAB | Facility: MEDICAL CENTER | Age: 58
End: 2023-02-23

## 2023-02-23 DIAGNOSIS — I26.94 MULTIPLE SUBSEGMENTAL PULMONARY EMBOLI WITHOUT ACUTE COR PULMONALE (HCC): ICD-10-CM

## 2023-02-23 DIAGNOSIS — R74.8 ELEVATED LIVER ENZYMES: ICD-10-CM

## 2023-02-23 DIAGNOSIS — D68.59 PROTEIN S DEFICIENCY (HCC): ICD-10-CM

## 2023-02-23 DIAGNOSIS — Z86.711 HX PULMONARY EMBOLISM: ICD-10-CM

## 2023-02-23 DIAGNOSIS — R73.9 HYPERGLYCEMIA: ICD-10-CM

## 2023-02-23 DIAGNOSIS — E03.9 HYPOTHYROIDISM, UNSPECIFIED TYPE: ICD-10-CM

## 2023-02-23 DIAGNOSIS — R21 RASH AND NONSPECIFIC SKIN ERUPTION: ICD-10-CM

## 2023-02-23 DIAGNOSIS — R79.89 D-DIMER, ELEVATED: ICD-10-CM

## 2023-02-23 DIAGNOSIS — Z00.01 ENCOUNTER FOR ROUTINE ADULT HEALTH EXAMINATION WITH ABNORMAL FINDINGS: ICD-10-CM

## 2023-02-23 DIAGNOSIS — K76.0 FATTY LIVER: ICD-10-CM

## 2023-02-23 LAB
ALBUMIN SERPL BCP-MCNC: 3.9 G/DL (ref 3.5–5)
ALP SERPL-CCNC: 78 U/L (ref 46–116)
ALT SERPL W P-5'-P-CCNC: 26 U/L (ref 12–78)
ANION GAP SERPL CALCULATED.3IONS-SCNC: 6 MMOL/L (ref 4–13)
AST SERPL W P-5'-P-CCNC: 21 U/L (ref 5–45)
BACTERIA UR QL AUTO: ABNORMAL /HPF
BASOPHILS # BLD AUTO: 0.06 THOUSANDS/ÂΜL (ref 0–0.1)
BASOPHILS NFR BLD AUTO: 1 % (ref 0–1)
BILIRUB DIRECT SERPL-MCNC: 0.14 MG/DL (ref 0–0.2)
BILIRUB SERPL-MCNC: 0.61 MG/DL (ref 0.2–1)
BILIRUB UR QL STRIP: NEGATIVE
BUN SERPL-MCNC: 19 MG/DL (ref 5–25)
CALCIUM SERPL-MCNC: 9.9 MG/DL (ref 8.3–10.1)
CHLORIDE SERPL-SCNC: 105 MMOL/L (ref 96–108)
CHOLEST SERPL-MCNC: 191 MG/DL
CLARITY UR: ABNORMAL
CO2 SERPL-SCNC: 26 MMOL/L (ref 21–32)
COLOR UR: ABNORMAL
CREAT SERPL-MCNC: 0.99 MG/DL (ref 0.6–1.3)
CRP SERPL QL: 12.6 MG/L
D DIMER PPP FEU-MCNC: 0.87 UG/ML FEU
EOSINOPHIL # BLD AUTO: 0.1 THOUSAND/ÂΜL (ref 0–0.61)
EOSINOPHIL NFR BLD AUTO: 2 % (ref 0–6)
ERYTHROCYTE [DISTWIDTH] IN BLOOD BY AUTOMATED COUNT: 12.5 % (ref 11.6–15.1)
EST. AVERAGE GLUCOSE BLD GHB EST-MCNC: 111 MG/DL
GFR SERPL CREATININE-BSD FRML MDRD: 63 ML/MIN/1.73SQ M
GLUCOSE P FAST SERPL-MCNC: 87 MG/DL (ref 65–99)
GLUCOSE UR STRIP-MCNC: NEGATIVE MG/DL
HBA1C MFR BLD: 5.5 %
HCT VFR BLD AUTO: 45.9 % (ref 34.8–46.1)
HDLC SERPL-MCNC: 59 MG/DL
HGB BLD-MCNC: 14.7 G/DL (ref 11.5–15.4)
HGB UR QL STRIP.AUTO: NEGATIVE
IMM GRANULOCYTES # BLD AUTO: 0.04 THOUSAND/UL (ref 0–0.2)
IMM GRANULOCYTES NFR BLD AUTO: 1 % (ref 0–2)
KETONES UR STRIP-MCNC: NEGATIVE MG/DL
LDLC SERPL CALC-MCNC: 117 MG/DL (ref 0–100)
LEUKOCYTE ESTERASE UR QL STRIP: NEGATIVE
LYMPHOCYTES # BLD AUTO: 1.34 THOUSANDS/ÂΜL (ref 0.6–4.47)
LYMPHOCYTES NFR BLD AUTO: 23 % (ref 14–44)
MCH RBC QN AUTO: 30.2 PG (ref 26.8–34.3)
MCHC RBC AUTO-ENTMCNC: 32 G/DL (ref 31.4–37.4)
MCV RBC AUTO: 94 FL (ref 82–98)
MONOCYTES # BLD AUTO: 0.52 THOUSAND/ÂΜL (ref 0.17–1.22)
MONOCYTES NFR BLD AUTO: 9 % (ref 4–12)
MUCOUS THREADS UR QL AUTO: ABNORMAL
NEUTROPHILS # BLD AUTO: 3.85 THOUSANDS/ÂΜL (ref 1.85–7.62)
NEUTS SEG NFR BLD AUTO: 64 % (ref 43–75)
NITRITE UR QL STRIP: NEGATIVE
NON-SQ EPI CELLS URNS QL MICRO: ABNORMAL /HPF
NONHDLC SERPL-MCNC: 132 MG/DL
NRBC BLD AUTO-RTO: 0 /100 WBCS
PH UR STRIP.AUTO: 6 [PH]
PLATELET # BLD AUTO: 286 THOUSANDS/UL (ref 149–390)
PMV BLD AUTO: 11.1 FL (ref 8.9–12.7)
POTASSIUM SERPL-SCNC: 4.2 MMOL/L (ref 3.5–5.3)
PROT SERPL-MCNC: 7.9 G/DL (ref 6.4–8.4)
PROT UR STRIP-MCNC: ABNORMAL MG/DL
RBC # BLD AUTO: 4.86 MILLION/UL (ref 3.81–5.12)
RBC #/AREA URNS AUTO: ABNORMAL /HPF
SODIUM SERPL-SCNC: 137 MMOL/L (ref 135–147)
SP GR UR STRIP.AUTO: 1.03 (ref 1–1.03)
TRIGL SERPL-MCNC: 75 MG/DL
TSH SERPL DL<=0.05 MIU/L-ACNC: 2.65 UIU/ML (ref 0.45–4.5)
UROBILINOGEN UR STRIP-ACNC: <2 MG/DL
WBC # BLD AUTO: 5.91 THOUSAND/UL (ref 4.31–10.16)
WBC #/AREA URNS AUTO: ABNORMAL /HPF

## 2023-02-24 ENCOUNTER — TELEPHONE (OUTPATIENT)
Dept: GASTROENTEROLOGY | Facility: CLINIC | Age: 58
End: 2023-02-24

## 2023-02-24 LAB
ALMOND IGE QN: <0.1 KUA/I
CASHEW NUT IGE QN: <0.1 KUA/I
CODFISH IGE QN: <0.1 KUA/I
EGG WHITE IGE QN: <0.1 KUA/I
ENDOMYSIUM IGA SER QL: NEGATIVE
GLIADIN PEPTIDE IGA SER-ACNC: 10 UNITS (ref 0–19)
GLIADIN PEPTIDE IGG SER-ACNC: 10 UNITS (ref 0–19)
GLUTEN IGE QN: <0.1 KUA/I
HAZELNUT IGE QN: <0.1 KUA/L
IGA SERPL-MCNC: 396 MG/DL (ref 87–352)
MILK IGE QN: <0.1 KUA/I
PEANUT IGE QN: <0.1 KUA/I
SALMON IGE QN: <0.1 KUA/I
SCALLOP IGE QN: <0.1 KUA/L
SESAME SEED IGE QN: <0.1 KUA/I
SHRIMP IGE QN: <0.1 KUA/L
SOYBEAN IGE QN: <0.1 KUA/I
TOTAL IGE SMQN RAST: 16.4 KU/L (ref 0–113)
TTG IGA SER-ACNC: <2 U/ML (ref 0–3)
TTG IGG SER-ACNC: 7 U/ML (ref 0–5)
TUNA IGE QN: <0.1 KUA/I
WALNUT IGE QN: <0.1 KUA/I
WHEAT IGE QN: <0.1 KUA/I

## 2023-02-24 NOTE — TELEPHONE ENCOUNTER
Patient called in stating that she had a miss call from Kyle Vickers and was just returning the phone call

## 2023-02-27 LAB
PROT C AG ACT/NOR PPP IA: >150 % OF NORMAL (ref 60–150)
PROT S ACT/NOR PPP: 101 % (ref 68–108)

## 2023-03-02 NOTE — TELEPHONE ENCOUNTER
I called patient and left message that I was returning her call  I did already speak to her on 2/28/23 regarding her results so I advised her if she had further questions to c/b to discuss   Thank you

## 2023-03-06 ENCOUNTER — OFFICE VISIT (OUTPATIENT)
Dept: HEMATOLOGY ONCOLOGY | Facility: CLINIC | Age: 58
End: 2023-03-06

## 2023-03-06 VITALS
WEIGHT: 293 LBS | DIASTOLIC BLOOD PRESSURE: 80 MMHG | HEIGHT: 63 IN | TEMPERATURE: 97 F | BODY MASS INDEX: 51.91 KG/M2 | HEART RATE: 85 BPM | OXYGEN SATURATION: 98 % | RESPIRATION RATE: 16 BRPM | SYSTOLIC BLOOD PRESSURE: 118 MMHG

## 2023-03-06 DIAGNOSIS — D68.52 PROTHROMBIN GENE MUTATION (HCC): Primary | ICD-10-CM

## 2023-03-06 PROBLEM — D68.59 PROTEIN C DEFICIENCY (HCC): Status: RESOLVED | Noted: 2022-10-03 | Resolved: 2023-03-06
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
## 2023-03-07 ENCOUNTER — OFFICE VISIT (OUTPATIENT)
Dept: FAMILY MEDICINE CLINIC | Facility: CLINIC | Age: 58
End: 2023-03-07

## 2023-03-07 VITALS
DIASTOLIC BLOOD PRESSURE: 80 MMHG | WEIGHT: 293 LBS | SYSTOLIC BLOOD PRESSURE: 118 MMHG | RESPIRATION RATE: 20 BRPM | TEMPERATURE: 97.7 F | OXYGEN SATURATION: 97 % | BODY MASS INDEX: 51.91 KG/M2 | HEART RATE: 82 BPM | HEIGHT: 63 IN

## 2023-03-07 DIAGNOSIS — Z12.31 ENCOUNTER FOR SCREENING MAMMOGRAM FOR MALIGNANT NEOPLASM OF BREAST: ICD-10-CM

## 2023-03-07 DIAGNOSIS — I10 PRIMARY HYPERTENSION: Primary | ICD-10-CM

## 2023-03-07 DIAGNOSIS — E03.8 OTHER SPECIFIED HYPOTHYROIDISM: ICD-10-CM

## 2023-03-07 DIAGNOSIS — E01.0 THYROMEGALY: ICD-10-CM

## 2023-03-07 DIAGNOSIS — E78.2 MIXED HYPERLIPIDEMIA: ICD-10-CM

## 2023-03-07 DIAGNOSIS — Z12.11 SCREENING FOR MALIGNANT NEOPLASM OF COLON: ICD-10-CM

## 2023-03-07 DIAGNOSIS — R21 RASH: ICD-10-CM

## 2023-03-07 RX ORDER — LEVOTHYROXINE SODIUM 0.05 MG/1
50 TABLET ORAL
Qty: 90 TABLET | Refills: 1 | Status: SHIPPED | OUTPATIENT
Start: 2023-03-07

## 2023-03-07 RX ORDER — BETAMETHASONE DIPROPIONATE 0.5 MG/G
CREAM TOPICAL 2 TIMES DAILY
Qty: 30 G | Refills: 0 | Status: SHIPPED | OUTPATIENT
Start: 2023-03-07

## 2023-03-07 RX ORDER — PERMETHRIN 50 MG/G
CREAM TOPICAL ONCE
Qty: 60 G | Refills: 1 | Status: SHIPPED | OUTPATIENT
Start: 2023-03-07 | End: 2023-03-07

## 2023-03-07 NOTE — PROGRESS NOTES
Name: Malik Oliveros      : 1965      MRN: [id number]  Encounter Provider: APPLE Wray  Encounter Date: 3/7/2023   Encounter department: [address] [place] [address] Place     1  Primary hypertension  -     CBC and differential; Future  -     Comprehensive metabolic panel; Future  -     Lipid panel; Future  -     TSH, 3rd generation; Future  -     UA w Reflex to Microscopic w Reflex to Culture    2  Mixed hyperlipidemia  -     CBC and differential; Future  -     Comprehensive metabolic panel; Future  -     Lipid panel; Future  -     TSH, 3rd generation; Future  -     UA w Reflex to Microscopic w Reflex to Culture    3  Encounter for screening mammogram for malignant neoplasm of breast  -     Mammo screening bilateral w 3d & cad; Future; Expected date: 2023    4  Rash  -     Mammo screening bilateral w 3d & cad; Future; Expected date: 2023  -     permethrin (ELIMITE) 5 % cream; Apply topically once for 1 dose  -     betamethasone, augmented, (DIPROLENE-AF) 0 05 % cream; Apply topically 2 (two) times a day    5  Thyromegaly  -     levothyroxine 50 mcg tablet; Take 1 tablet (50 mcg total) by mouth daily in the early morning  -     CBC and differential; Future  -     Comprehensive metabolic panel; Future  -     Lipid panel; Future  -     TSH, 3rd generation; Future  -     UA w Reflex to Microscopic w Reflex to Culture    6  Other specified hypothyroidism  -     levothyroxine 50 mcg tablet; Take 1 tablet (50 mcg total) by mouth daily in the early morning    7  Screening for malignant neoplasm of colon  -     Cologuard         Physical assessment is unremarkable  Patient continued to have a very high BMI but otherwise to be healthy  All vital signs and physical assessment was unremarkable  With the exception of a rash which she states she has had for a couple of weeks maybe up to a couple of months    Did advise him unsure of the origin contact dermatitis or possibly scabies  Gave Elimite cream and betamethasone Elimite cream instructions were given she is to complete that prior to trying the betamethasone  Contact me if fails to improve or significantly worsens  Labs were also reviewed all found to be stable  Patient was informed of this medications for the thyroid were refilled at current dose with no changes  Routine labs were given to her she is advised to complete this 1 to 2 weeks prior to her next office visit  She was also given an order for a mammogram and an order for Cologuard she is advised to complete those as they are very critical screening components to ensure her health to protect from colorectal cancer and breast cancer  Patient verbalized understanding and will do this  Dosing all possible side effects of the prescribed medications or medications that had been prescribed in the past were reviewed and all questions were answered  Patient verbalized agreement and understanding of the plan of care as outlined during the office visit today return to office as indicated or sooner if a problem arises  Patient is here for routine follow-up  To review most recent labs  To also discuss current state of health and any new problems that they may be experiencing  Patient states that medications taken as prescribed and very well tolerated no new complaints at this time  Review of Systems   Constitutional: Negative for appetite change and fever  HENT: Negative for sinus pressure and sore throat  Eyes: Negative for pain  Respiratory: Negative for shortness of breath  Cardiovascular: Negative for chest pain  Gastrointestinal: Negative for abdominal pain  Genitourinary: Negative for dysuria  Musculoskeletal: Negative for arthralgias and myalgias  Skin: Positive for rash (Very itchy rash noticed that after going to a hospital lab to get lab work done that arm was on the chair advised could possibly be contact dermatitis)   Negative for color change  Neurological: Negative for light-headedness  Psychiatric/Behavioral: Negative for behavioral problems  Past Medical History:   Diagnosis Date   • Anxiety    • Asthma    • Chronic fatigue    • COPD (chronic obstructive pulmonary disease) ([facility])    • Depression    • Fibromyalgia    • Head injury    • Hypothyroid    • Morbid obesity with BMI of 60 0-69 9, adult ([facility] [address] )    • Sleep apnea     PT states a very mild case, prescribed CPAP but does not use machine       Past Surgical History:   Procedure Laterality Date   • APPENDECTOMY     •  SECTION     • ESOPHAGOGASTRODUODENOSCOPY     • HERNIA REPAIR      Primary repair of incarcerated umbilical hernia - Dr Marva Story    • HYSTERECTOMY      Hysterectomy/revise vagina   • KNEE SURGERY      Arthrotomy with open meniscus repair    • LAPAROSCOPIC CHOLECYSTECTOMY     • TONSILECTOMY AND ADNOIDECTOMY     • TUMOR REMOVAL      head   • URETER SURGERY      repair   • WISDOM TOOTH EXTRACTION       Family History   Problem Relation Age of Onset   • Diabetes Family    • Heart disease Family    • Depression Mother    • Anxiety disorder Mother    • No Known Problems Father    • No Known Problems Sister    • Diabetes Brother    • Hypertension Brother    • No Known Problems Son    • Breast cancer Paternal Grandmother    • Esophageal cancer Paternal Grandfather    • Skin cancer Paternal Grandfather    • No Known Problems Maternal Grandmother    • No Known Problems Maternal Grandfather    • Stroke Neg Hx    • Thyroid disease Neg Hx      Social History     Socioeconomic History   • Marital status: /Civil Union     Spouse name: None   • Number of children: None   • Years of education: None   • Highest education level: None   Occupational History   • None   Tobacco Use   • Smoking status: Former     Years: 23 50     Types: Cigarettes   • Smokeless tobacco: Never   • Tobacco comments:     3= PPD - As per [place]    Vaping Use   • Vaping Use: Never used   Substance and Sexual Activity   • Alcohol use: Not Currently     Comment: rarely   • Drug use: Not Currently   • Sexual activity: Not Currently     Partners: Male   Other Topics Concern   • None   Social History Narrative    · Most recent tobacco use screenin2018      · Do you currently or have you served in the [employer]:   No      · Were you activated, into active duty, as a member of the [employer] or as a Reservist:   No      · Advance directive:   No     - As per [employer]      Social Determinants of Health     Financial Resource Strain: Not on file   Food Insecurity: Not on file   Transportation Needs: Not on file   Physical Activity: Not on file   Stress: Not on file   Social Connections: Not on file   Intimate Partner Violence: Not on file   Housing Stability: Not on file     Current Outpatient Medications on File Prior to Visit   Medication Sig   • acetaminophen (TYLENOL) 325 mg tablet Take 2 tablets (650 mg total) by mouth every 6 (six) hours as needed for mild pain   • albuterol (2 5 mg/3 mL) 0 083 % nebulizer solution Inhale 1 each   • albuterol (PROVENTIL HFA,VENTOLIN HFA) 90 mcg/act inhaler Inhale 2 puffs every 6 (six) hours as needed for wheezing or shortness of breath   • benzonatate (TESSALON PERLES) 100 mg capsule Take 1 capsule (100 mg total) by mouth 3 (three) times a day as needed for cough   • buPROPion (WELLBUTRIN SR) 100 mg 12 hr tablet Take 100 mg by mouth daily   • DULoxetine (CYMBALTA) 60 mg delayed release capsule Take 60 mg by mouth daily   • omeprazole (PriLOSEC) 20 mg delayed release capsule Take 1 capsule (20 mg total) by mouth daily   • [DISCONTINUED] levothyroxine 50 mcg tablet TAKE 1 TABLET (50 MCG TOTAL) BY MOUTH DAILY IN THE EARLY MORNING   • lidocaine (LIDODERM) 5 % Apply 2 patches topically daily for 10 days Remove & Discard patch within 12 hours or as directed by MD     Allergies   Allergen Reactions   • Ciprofloxacin Other (See Comments)     Reaction Date: ; • Penicillins Other (See Comments)   • Advil [Ibuprofen] Hives and Rash     Immunization History   Administered Date(s) Administered   • Tdap 02/26/2021       Objective     /80 (BP Location: Left arm, Patient Position: Sitting, Cuff Size: Large)   Pulse 82   Temp 97 7 °F (36 5 °C) (Temporal)   Resp 20   Ht 5' 3" (1 6 m)   Wt (!) 162 kg (358 lb)   SpO2 97%   BMI 63 42 kg/m²     Physical Exam  Vitals and nursing note reviewed  Constitutional:       General: She is not in acute distress  Appearance: She is well-developed  She is not diaphoretic  HENT:      Head: Normocephalic and atraumatic  Right Ear: External ear normal       Left Ear: External ear normal       Mouth/Throat:      Mouth: Mucous membranes are moist    Eyes:      Pupils: Pupils are equal, round, and reactive to light  Cardiovascular:      Rate and Rhythm: Normal rate and regular rhythm  Heart sounds: Normal heart sounds  Pulmonary:      Effort: Pulmonary effort is normal       Breath sounds: Normal breath sounds  Abdominal:      Palpations: Abdomen is soft  Musculoskeletal:         General: Normal range of motion  Cervical back: Normal range of motion and neck supple  Skin:     General: Skin is dry  Findings: Rash (Advised patient the origin of the rash I am unsure I would like her to hold treatment we will treat her ) present  Neurological:      Mental Status: She is alert and oriented to person, place, and time  Psychiatric:         Behavior: Behavior normal          Thought Content:  Thought content normal        [facility]

## 2023-04-24 ENCOUNTER — TELEPHONE (OUTPATIENT)
Dept: GASTROENTEROLOGY | Facility: CLINIC | Age: 58
End: 2023-04-24

## 2023-04-25 ENCOUNTER — HOSPITAL ENCOUNTER (OUTPATIENT)
Dept: RADIOLOGY | Facility: MEDICAL CENTER | Age: 58
Discharge: HOME | End: 2023-04-25

## 2023-04-25 VITALS — WEIGHT: 293 LBS | BODY MASS INDEX: 51.91 KG/M2 | HEIGHT: 63 IN

## 2023-04-25 DIAGNOSIS — Z12.31 ENCOUNTER FOR SCREENING MAMMOGRAM FOR MALIGNANT NEOPLASM OF BREAST: ICD-10-CM

## 2023-04-25 DIAGNOSIS — R21 RASH: ICD-10-CM

## 2023-06-13 ENCOUNTER — OFFICE VISIT (OUTPATIENT)
Dept: FAMILY MEDICINE CLINIC | Facility: CLINIC | Age: 58
End: 2023-06-13
Payer: COMMERCIAL

## 2023-06-13 VITALS
SYSTOLIC BLOOD PRESSURE: 126 MMHG | DIASTOLIC BLOOD PRESSURE: 68 MMHG | OXYGEN SATURATION: 97 % | BODY MASS INDEX: 63.42 KG/M2 | HEIGHT: 63 IN | HEART RATE: 90 BPM | RESPIRATION RATE: 18 BRPM | TEMPERATURE: 98.2 F

## 2023-06-13 DIAGNOSIS — R21 RASH: Primary | ICD-10-CM

## 2023-06-13 PROCEDURE — 99213 OFFICE O/P EST LOW 20 MIN: CPT | Performed by: NURSE PRACTITIONER

## 2023-06-13 RX ORDER — DULOXETIN HYDROCHLORIDE 60 MG/1
60 CAPSULE, DELAYED RELEASE ORAL DAILY
Qty: 90 CAPSULE | Refills: 1 | Status: CANCELLED | OUTPATIENT
Start: 2023-06-13

## 2023-06-13 RX ORDER — BUPROPION HYDROCHLORIDE 100 MG/1
100 TABLET, EXTENDED RELEASE ORAL DAILY
Qty: 180 TABLET | Refills: 1 | Status: CANCELLED | OUTPATIENT
Start: 2023-06-13

## 2023-06-13 RX ORDER — BETAMETHASONE DIPROPIONATE 0.5 MG/G
CREAM TOPICAL 2 TIMES DAILY
Qty: 30 G | Refills: 0 | Status: SHIPPED | OUTPATIENT
Start: 2023-06-13

## 2023-06-13 RX ORDER — PERMETHRIN 50 MG/G
CREAM TOPICAL ONCE
Qty: 60 G | Refills: 0 | Status: SHIPPED | OUTPATIENT
Start: 2023-06-13 | End: 2023-06-13

## 2023-06-13 RX ORDER — PERMETHRIN 50 MG/G
CREAM TOPICAL
COMMUNITY
Start: 2023-03-07

## 2023-06-13 NOTE — PROGRESS NOTES
Name: Renate Montejo      : 1965      MRN: [id number]  Encounter Provider: APPLE Davis  Encounter Date: 2023   Encounter department: [address] [place] [address] Place     1  Rash  -     permethrin (ELIMITE) 5 % cream; Apply topically once for 1 dose  -     betamethasone, augmented, (DIPROLENE-AF) 0 05 % cream; Apply topically 2 (two) times a day    She is advised to retreat with the pyrethrin cream wait approximately 3 to 5 days if fails to improve contact me in the office if does improve contact in the office either way  Steroid cream was also given for possible treatment for hives  Dosing all possible side effects of the prescribed medications or medications that had been prescribed in the past were reviewed and all questions were answered  Patient verbalized agreement and understanding of the plan of care as outlined during the office visit today return to office as indicated or sooner if a problem arises  Subjective      Patient is here for follow-up for rash  She states that it did improve slightly with the scabies treatment now is back  No other   Related symptoms    Review of Systems   Skin: Positive for rash (I recently treated for scabies did improve but spontaneously returned  Rash is very itchy to the point of being scratched open  )         Current Outpatient Medications on File Prior to Visit   Medication Sig   • acetaminophen (TYLENOL) 325 mg tablet Take 2 tablets (650 mg total) by mouth every 6 (six) hours as needed for mild pain   • albuterol (2 5 mg/3 mL) 0 083 % nebulizer solution Inhale 1 each   • albuterol (PROVENTIL HFA,VENTOLIN HFA) 90 mcg/act inhaler Inhale 2 puffs every 6 (six) hours as needed for wheezing or shortness of breath   • buPROPion (WELLBUTRIN SR) 100 mg 12 hr tablet Take 100 mg by mouth daily   • DULoxetine (CYMBALTA) 60 mg delayed release capsule Take 60 mg by mouth daily   • levothyroxine 50 mcg tablet Take 1 tablet "(50 mcg total) by mouth daily in the early morning   • omeprazole (PriLOSEC) 20 mg delayed release capsule Take 1 capsule (20 mg total) by mouth daily   • benzonatate (TESSALON PERLES) 100 mg capsule Take 1 capsule (100 mg total) by mouth 3 (three) times a day as needed for cough (Patient not taking: Reported on 6/13/2023)   • betamethasone, augmented, (DIPROLENE-AF) 0 05 % cream Apply topically 2 (two) times a day (Patient not taking: Reported on 6/13/2023)   • lidocaine (LIDODERM) 5 % Apply 2 patches topically daily for 10 days Remove & Discard patch within 12 hours or as directed by MD   • permethrin (ELIMITE) 5 % cream APPLY TOPICALLY ONCE FOR 1 DOSE  (Patient not taking: Reported on 6/13/2023)       Objective     /68 (BP Location: Left arm, Patient Position: Sitting, Cuff Size: Large)   Pulse 90   Temp 98 2 °F (36 8 °C) (Temporal)   Resp 18   Ht 5' 3\" (1 6 m)   SpO2 97%   BMI 63 42 kg/m²     Physical Exam  HENT:      Right Ear: Tympanic membrane normal       Left Ear: Tympanic membrane normal    Cardiovascular:      Rate and Rhythm: Regular rhythm  Heart sounds: Normal heart sounds  Pulmonary:      Breath sounds: Normal breath sounds  Skin:     Findings: Rash (Rash bilateral forearm appears to be linear in nature possible undertreatment of scabies ) present         Loyall Magic, CRNP  "

## 2023-07-24 ENCOUNTER — TELEPHONE (OUTPATIENT)
Dept: OBGYN CLINIC | Facility: MEDICAL CENTER | Age: 58
End: 2023-07-24

## 2023-07-24 NOTE — TELEPHONE ENCOUNTER
Caller: Patient    Doctor: Preet Montano    Reason for call:     Patient was seen on 1/05/2023 for a ultrasound-guided knee injection in left knee. She is stating it wore off this Thursday and would like to schedule another one. Please give her a call back to schedule her   For her left knee.     Call back#: [phone]

## 2023-07-27 NOTE — TELEPHONE ENCOUNTER
Caller: Patient     Doctor: Deja Morin     Reason for call:     Patient has not heard back from anyone and would like to schedule this injection again, ultrasound-guided knee injection in left knee as soon as she can. She is in so much pain (3-20 averaging). She would really like to schedule again with Dr Deja Morin, she did well with last injection on 1/5/23.     Patient was seen on 1/05/2023 for a ultrasound-guided knee injection in left knee. She is stating it wore off this Thursday and would like to schedule another one.    Please give her a call back to schedule her   For her left knee.     Call back#: [phone]

## 2023-08-02 ENCOUNTER — OFFICE VISIT (OUTPATIENT)
Dept: FAMILY MEDICINE CLINIC | Facility: CLINIC | Age: 58
End: 2023-08-02
Payer: COMMERCIAL

## 2023-08-02 VITALS
RESPIRATION RATE: 20 BRPM | WEIGHT: 293 LBS | TEMPERATURE: 98.2 F | HEART RATE: 86 BPM | OXYGEN SATURATION: 98 % | BODY MASS INDEX: 51.91 KG/M2 | SYSTOLIC BLOOD PRESSURE: 104 MMHG | DIASTOLIC BLOOD PRESSURE: 76 MMHG | HEIGHT: 63 IN

## 2023-08-02 DIAGNOSIS — M17.12 PRIMARY OSTEOARTHRITIS OF LEFT KNEE: Primary | ICD-10-CM

## 2023-08-02 DIAGNOSIS — R21 RASH IN ADULT: ICD-10-CM

## 2023-08-02 DIAGNOSIS — G62.9 NEUROPATHY: ICD-10-CM

## 2023-08-02 PROCEDURE — 99214 OFFICE O/P EST MOD 30 MIN: CPT | Performed by: NURSE PRACTITIONER

## 2023-08-02 RX ORDER — TRAMADOL HYDROCHLORIDE 50 MG/1
50 TABLET ORAL EVERY 8 HOURS PRN
Qty: 120 TABLET | Refills: 0 | Status: SHIPPED | OUTPATIENT
Start: 2023-08-02

## 2023-08-02 RX ORDER — GABAPENTIN 100 MG/1
100 CAPSULE ORAL 2 TIMES DAILY
Qty: 60 CAPSULE | Refills: 2 | Status: SHIPPED | OUTPATIENT
Start: 2023-08-02

## 2023-08-02 RX ORDER — PERMETHRIN 50 MG/G
CREAM TOPICAL ONCE
Qty: 120 G | Refills: 1 | Status: SHIPPED | OUTPATIENT
Start: 2023-08-02 | End: 2023-08-02

## 2023-08-02 NOTE — PROGRESS NOTES
Name: Hudson Turong      : 1965      MRN: [id number]  Encounter Provider: APPLE Cordoba  Encounter Date: 2023   Encounter department: [address] [place] [address] Avenue     1. Primary osteoarthritis of left knee  -     traMADol (Ultram) 50 mg tablet; Take 1 tablet (50 mg total) by mouth every 8 (eight) hours as needed for moderate pain    2. Rash in adult  -     permethrin (ELIMITE) 5 % cream; Apply topically once for 1 dose  -     traMADol (Ultram) 50 mg tablet; Take 1 tablet (50 mg total) by mouth every 8 (eight) hours as needed for moderate pain    3. Neuropathy  -     gabapentin (Neurontin) 100 mg capsule; Take 1 capsule (100 mg total) by mouth 2 (two) times a day    Physical assessment is limited due to patient's size. Patient's x-rays were reviewed reviewed with her she does have bone-on-bone knee did advise we will send tramadol on a limited basis to the pharmacy for her also she has a complaint of neuropathy on the left side did advise we will start gabapentin which she has taken in the past is well-tolerated. All questions answered return to office for health physical scheduled for less than 1 month. Subjective      Patient is here with an ongoing complaint of left knee pain patient does have bilateral knee pain but the left knee seems to be giving her the most discomfort. The most recent x-rays were in  and 2 at December the indicate that she is actually bone-on-bone she does have an upcoming appointment with orthopedics for consult for possible knee replacement. Patient does do well with tramadol and Cymbalta as she is not currently on tramadol she also has taken Neurontin in the past well-tolerated. Review of Systems   Musculoskeletal: Positive for arthralgias, gait problem, joint swelling and myalgias.        Current Outpatient Medications on File Prior to Visit   Medication Sig   • acetaminophen (TYLENOL) 325 mg tablet Take 2 tablets (650 mg total) by mouth every 6 (six) hours as needed for mild pain   • buPROPion (WELLBUTRIN SR) 100 mg 12 hr tablet Take 100 mg by mouth daily   • DULoxetine (CYMBALTA) 60 mg delayed release capsule Take 60 mg by mouth daily   • levothyroxine 50 mcg tablet Take 1 tablet (50 mcg total) by mouth daily in the early morning   • omeprazole (PriLOSEC) 20 mg delayed release capsule Take 1 capsule (20 mg total) by mouth daily   • albuterol (2.5 mg/3 mL) 0.083 % nebulizer solution Inhale 1 each (Patient not taking: Reported on 8/2/2023)   • albuterol (PROVENTIL HFA,VENTOLIN HFA) 90 mcg/act inhaler Inhale 2 puffs every 6 (six) hours as needed for wheezing or shortness of breath (Patient not taking: Reported on 8/2/2023)   • benzonatate (TESSALON PERLES) 100 mg capsule Take 1 capsule (100 mg total) by mouth 3 (three) times a day as needed for cough (Patient not taking: Reported on 6/13/2023)   • betamethasone, augmented, (DIPROLENE-AF) 0.05 % cream Apply topically 2 (two) times a day (Patient not taking: Reported on 6/13/2023)   • betamethasone, augmented, (DIPROLENE-AF) 0.05 % cream Apply topically 2 (two) times a day (Patient not taking: Reported on 8/2/2023)   • lidocaine (LIDODERM) 5 % Apply 2 patches topically daily for 10 days Remove & Discard patch within 12 hours or as directed by MD   • [DISCONTINUED] permethrin (ELIMITE) 5 % cream APPLY TOPICALLY ONCE FOR 1 DOSE. (Patient not taking: Reported on 6/13/2023)       Objective     /76 (BP Location: Left arm, Patient Position: Sitting, Cuff Size: Large)   Pulse 86   Temp 98.2 °F (36.8 °C) (Temporal)   Resp 20   Ht 5' 3" (1.6 m)   Wt (!) 161 kg (356 lb)   SpO2 98%   BMI 63.06 kg/m²     Physical Exam  Cardiovascular:      Rate and Rhythm: Normal rate and regular rhythm. Heart sounds: Normal heart sounds. Pulmonary:      Effort: Pulmonary effort is normal.      Breath sounds: Normal breath sounds. Musculoskeletal:         General: Tenderness present. Neurological:      General: No focal deficit present. Mental Status: She is alert and oriented to person, place, and time.        APPLE Garcia

## 2023-08-05 DIAGNOSIS — K21.9 GASTROESOPHAGEAL REFLUX DISEASE WITHOUT ESOPHAGITIS: ICD-10-CM

## 2023-08-05 RX ORDER — OMEPRAZOLE 20 MG/1
20 CAPSULE, DELAYED RELEASE ORAL DAILY
Qty: 30 CAPSULE | Refills: 5 | Status: SHIPPED | OUTPATIENT
Start: 2023-08-05

## 2023-08-14 ENCOUNTER — TELEPHONE (OUTPATIENT)
Dept: FAMILY MEDICINE CLINIC | Facility: CLINIC | Age: 58
End: 2023-08-14

## 2023-08-14 ENCOUNTER — HOSPITAL ENCOUNTER (EMERGENCY)
Facility: HOSPITAL | Age: 58
Discharge: HOME | End: 2023-08-14
Attending: EMERGENCY MEDICINE
Payer: COMMERCIAL

## 2023-08-14 ENCOUNTER — APPOINTMENT (EMERGENCY)
Dept: CT IMAGING | Facility: HOSPITAL | Age: 58
End: 2023-08-14
Payer: COMMERCIAL

## 2023-08-14 VITALS
RESPIRATION RATE: 16 BRPM | BODY MASS INDEX: 63.06 KG/M2 | OXYGEN SATURATION: 98 % | TEMPERATURE: 98.2 F | WEIGHT: 293 LBS | HEART RATE: 71 BPM | DIASTOLIC BLOOD PRESSURE: 78 MMHG | SYSTOLIC BLOOD PRESSURE: 119 MMHG

## 2023-08-14 DIAGNOSIS — R91.8 LUNG INFILTRATE ON CT: ICD-10-CM

## 2023-08-14 DIAGNOSIS — R07.9 RIGHT-SIDED CHEST PAIN: Primary | ICD-10-CM

## 2023-08-14 LAB
2HR DELTA HS TROPONIN: 1 NG/L
ALBUMIN SERPL BCP-MCNC: 4.2 G/DL (ref 3.5–5)
ALP SERPL-CCNC: 77 U/L (ref 34–104)
ALT SERPL W P-5'-P-CCNC: 18 U/L (ref 7–52)
ANION GAP SERPL CALCULATED.3IONS-SCNC: 9 MMOL/L
AST SERPL W P-5'-P-CCNC: 18 U/L (ref 13–39)
BASOPHILS # BLD AUTO: 0.06 THOUSANDS/ÂΜL (ref 0–0.1)
BASOPHILS NFR BLD AUTO: 1 % (ref 0–1)
BILIRUB SERPL-MCNC: 0.64 MG/DL (ref 0.2–1)
BNP SERPL-MCNC: 25 PG/ML (ref 0–100)
BUN SERPL-MCNC: 13 MG/DL (ref 5–25)
CALCIUM SERPL-MCNC: 9.4 MG/DL (ref 8.4–10.2)
CARDIAC TROPONIN I PNL SERPL HS: 4 NG/L
CARDIAC TROPONIN I PNL SERPL HS: 5 NG/L
CHLORIDE SERPL-SCNC: 103 MMOL/L (ref 96–108)
CO2 SERPL-SCNC: 26 MMOL/L (ref 21–32)
CREAT SERPL-MCNC: 1.07 MG/DL (ref 0.6–1.3)
EOSINOPHIL # BLD AUTO: 0.14 THOUSAND/ÂΜL (ref 0–0.61)
EOSINOPHIL NFR BLD AUTO: 2 % (ref 0–6)
ERYTHROCYTE [DISTWIDTH] IN BLOOD BY AUTOMATED COUNT: 12.5 % (ref 11.6–15.1)
GFR SERPL CREATININE-BSD FRML MDRD: 57 ML/MIN/1.73SQ M
GLUCOSE SERPL-MCNC: 106 MG/DL (ref 65–140)
HCT VFR BLD AUTO: 44.3 % (ref 34.8–46.1)
HGB BLD-MCNC: 14.9 G/DL (ref 11.5–15.4)
IMM GRANULOCYTES # BLD AUTO: 0.05 THOUSAND/UL (ref 0–0.2)
IMM GRANULOCYTES NFR BLD AUTO: 1 % (ref 0–2)
LIPASE SERPL-CCNC: 14 U/L (ref 11–82)
LYMPHOCYTES # BLD AUTO: 1.16 THOUSANDS/ÂΜL (ref 0.6–4.47)
LYMPHOCYTES NFR BLD AUTO: 20 % (ref 14–44)
MCH RBC QN AUTO: 30.5 PG (ref 26.8–34.3)
MCHC RBC AUTO-ENTMCNC: 33.6 G/DL (ref 31.4–37.4)
MCV RBC AUTO: 91 FL (ref 82–98)
MONOCYTES # BLD AUTO: 0.39 THOUSAND/ÂΜL (ref 0.17–1.22)
MONOCYTES NFR BLD AUTO: 7 % (ref 4–12)
NEUTROPHILS # BLD AUTO: 3.96 THOUSANDS/ÂΜL (ref 1.85–7.62)
NEUTS SEG NFR BLD AUTO: 69 % (ref 43–75)
NRBC BLD AUTO-RTO: 0 /100 WBCS
PLATELET # BLD AUTO: 262 THOUSANDS/UL (ref 149–390)
PMV BLD AUTO: 10.1 FL (ref 8.9–12.7)
POTASSIUM SERPL-SCNC: 4.4 MMOL/L (ref 3.5–5.3)
PROT SERPL-MCNC: 7.5 G/DL (ref 6.4–8.4)
RBC # BLD AUTO: 4.88 MILLION/UL (ref 3.81–5.12)
SODIUM SERPL-SCNC: 138 MMOL/L (ref 135–147)
WBC # BLD AUTO: 5.76 THOUSAND/UL (ref 4.31–10.16)

## 2023-08-14 PROCEDURE — G1004 CDSM NDSC: HCPCS

## 2023-08-14 PROCEDURE — 93005 ELECTROCARDIOGRAM TRACING: CPT

## 2023-08-14 PROCEDURE — 83690 ASSAY OF LIPASE: CPT | Performed by: PHYSICIAN ASSISTANT

## 2023-08-14 PROCEDURE — 83880 ASSAY OF NATRIURETIC PEPTIDE: CPT | Performed by: PHYSICIAN ASSISTANT

## 2023-08-14 PROCEDURE — 99285 EMERGENCY DEPT VISIT HI MDM: CPT | Performed by: PHYSICIAN ASSISTANT

## 2023-08-14 PROCEDURE — 80053 COMPREHEN METABOLIC PANEL: CPT | Performed by: PHYSICIAN ASSISTANT

## 2023-08-14 PROCEDURE — 36415 COLL VENOUS BLD VENIPUNCTURE: CPT | Performed by: PHYSICIAN ASSISTANT

## 2023-08-14 PROCEDURE — 85025 COMPLETE CBC W/AUTO DIFF WBC: CPT | Performed by: PHYSICIAN ASSISTANT

## 2023-08-14 PROCEDURE — 84484 ASSAY OF TROPONIN QUANT: CPT | Performed by: PHYSICIAN ASSISTANT

## 2023-08-14 PROCEDURE — 71275 CT ANGIOGRAPHY CHEST: CPT

## 2023-08-14 PROCEDURE — NC001 PR NO CHARGE: Performed by: PHYSICIAN ASSISTANT

## 2023-08-14 PROCEDURE — 99284 EMERGENCY DEPT VISIT MOD MDM: CPT

## 2023-08-14 RX ORDER — ASPIRIN 81 MG/1
81 TABLET, CHEWABLE ORAL DAILY
COMMUNITY

## 2023-08-14 RX ORDER — DOXYCYCLINE HYCLATE 100 MG/1
100 CAPSULE ORAL 2 TIMES DAILY
Qty: 10 CAPSULE | Refills: 0 | Status: SHIPPED | OUTPATIENT
Start: 2023-08-14 | End: 2023-08-19

## 2023-08-14 RX ADMIN — IOHEXOL 100 ML: 350 INJECTION, SOLUTION INTRAVENOUS at 11:33

## 2023-08-14 NOTE — TELEPHONE ENCOUNTER
Patient called and reported that she is having the same pain as she had when she was dx with a PE. I advised patient to go to ER. Per Juanito Ferrors also verbalized need to go to ER for evaluation. Patient agreed.

## 2023-08-14 NOTE — ED PROVIDER NOTES
History  Chief Complaint   Patient presents with   • Rib Pain     Right sided rib pain x3 days Stabbing pain with deep breath. States its the same pain as earlier this year when she had a pulmonary embolism. This is a 67-year-old female with past medical history significant for COPD, fibromyalgia, morbid obesity, and pulmonary embolus presenting to the emergency department today for right-sided chest pain. Pain began 3 days ago. The patient notes the pain is stabbing and located to her anterior right-sided chest.  It does not radiate. It does not radiate to the back. It is worse with deep breathing. She notes it is similar to the prior pulmonary embolus she had. She denies any associated shortness of breath or palpitations. She has no dizziness, lightheadedness, or visual disturbances. She has no nausea, vomiting, diarrhea, or constipation. She denies any dysuria, hematuria, flank pain, or back pain. No foul-smelling urine. No vaginal bleeding or vaginal discharge. No other complaints at this time.       History provided by:  Patient   used: No    Chest Pain  Pain location:  R chest  Pain quality: stabbing    Pain radiates to:  Does not radiate  Pain radiates to the back: no    Pain severity:  Moderate  Onset quality:  Gradual  Duration:  3 days  Timing:  Constant  Progression:  Worsening  Chronicity:  New  Context: breathing    Relieved by:  Nothing  Worsened by:  Deep breathing  Ineffective treatments:  None tried  Associated symptoms: no abdominal pain, no altered mental status, no anorexia, no anxiety, no back pain, no claudication, no cough, no diaphoresis, no dizziness, no dysphagia, no fatigue, no fever, no headache, no heartburn, no lower extremity edema, no nausea, no near-syncope, no numbness, no orthopnea, no palpitations, no PND, no shortness of breath, no syncope, not vomiting and no weakness    Risk factors: obesity and prior DVT/PE        Prior to Admission Medications   Prescriptions Last Dose Informant Patient Reported? Taking?    DULoxetine (CYMBALTA) 60 mg delayed release capsule 8/13/2023 Self Yes Yes   Sig: Take 60 mg by mouth daily   acetaminophen (TYLENOL) 325 mg tablet  Self No No   Sig: Take 2 tablets (650 mg total) by mouth every 6 (six) hours as needed for mild pain   albuterol (2.5 mg/3 mL) 0.083 % nebulizer solution  Self Yes No   Sig: Inhale 1 each   Patient not taking: Reported on 8/2/2023   albuterol (PROVENTIL HFA,VENTOLIN HFA) 90 mcg/act inhaler  Self No No   Sig: Inhale 2 puffs every 6 (six) hours as needed for wheezing or shortness of breath   Patient not taking: Reported on 8/2/2023   aspirin 81 mg chewable tablet 8/14/2023  Yes Yes   Sig: Chew 81 mg daily   benzonatate (TESSALON PERLES) 100 mg capsule  Self No No   Sig: Take 1 capsule (100 mg total) by mouth 3 (three) times a day as needed for cough   Patient not taking: Reported on 6/13/2023   betamethasone, augmented, (DIPROLENE-AF) 0.05 % cream   No No   Sig: Apply topically 2 (two) times a day   Patient not taking: Reported on 6/13/2023   betamethasone, augmented, (DIPROLENE-AF) 0.05 % cream   No No   Sig: Apply topically 2 (two) times a day   Patient not taking: Reported on 8/2/2023   buPROPion (WELLBUTRIN SR) 100 mg 12 hr tablet 8/13/2023 Self Yes Yes   Sig: Take 100 mg by mouth daily   gabapentin (Neurontin) 100 mg capsule 8/13/2023  No Yes   Sig: Take 1 capsule (100 mg total) by mouth 2 (two) times a day   levothyroxine 50 mcg tablet 8/14/2023  No Yes   Sig: Take 1 tablet (50 mcg total) by mouth daily in the early morning   lidocaine (LIDODERM) 5 %   No No   Sig: Apply 2 patches topically daily for 10 days Remove & Discard patch within 12 hours or as directed by MD   omeprazole (PriLOSEC) 20 mg delayed release capsule Past Week  No Yes   Sig: TAKE 1 CAPSULE BY MOUTH EVERY DAY   traMADol (Ultram) 50 mg tablet Past Week  No Yes   Sig: Take 1 tablet (50 mg total) by mouth every 8 (eight) hours as needed for moderate pain      Facility-Administered Medications: None       Past Medical History:   Diagnosis Date   • Anxiety    • Asthma    • Chronic fatigue    • COPD (chronic obstructive pulmonary disease) ([facility])    • Depression    • Fibromyalgia    • Head injury    • Hypothyroid    • Morbid obesity with BMI of 60.0-69.9, adult ([facility])    • Sleep apnea     PT states a very mild case, prescribed CPAP but does not use machine. Past Surgical History:   Procedure Laterality Date   • APPENDECTOMY     •  SECTION     • ESOPHAGOGASTRODUODENOSCOPY     • HERNIA REPAIR      Primary repair of incarcerated umbilical hernia - Dr. Niranjan Chin    • HYSTERECTOMY      Hysterectomy/revise vagina   • KNEE SURGERY      Arthrotomy with open meniscus repair    • LAPAROSCOPIC CHOLECYSTECTOMY     • TONSILECTOMY AND ADNOIDECTOMY     • TUMOR REMOVAL      head   • URETER SURGERY      repair   • WISDOM TOOTH EXTRACTION         Family History   Problem Relation Age of Onset   • Depression Mother    • Anxiety disorder Mother    • No Known Problems Father    • No Known Problems Sister    • No Known Problems Maternal Grandmother    • No Known Problems Maternal Grandfather    • Breast cancer Paternal Grandmother    • Esophageal cancer Paternal Grandfather    • Skin cancer Paternal Grandfather    • Diabetes Brother    • Hypertension Brother    • No Known Problems Son    • Diabetes Family    • Heart disease Family    • Stroke Neg Hx    • Thyroid disease Neg Hx      I have reviewed and agree with the history as documented.     E-Cigarette/Vaping   • E-Cigarette Use Never User      E-Cigarette/Vaping Substances   • Nicotine No    • THC No    • CBD No    • Flavoring No    • Other No    • Unknown No      Social History     Tobacco Use   • Smoking status: Former     Years: 23.50     Types: Cigarettes   • Smokeless tobacco: Never   • Tobacco comments:     3= PPD - As per [employer] Use   • Vaping Use: Never used   Substance Use Topics • Alcohol use: Not Currently     Comment: rarely   • Drug use: Not Currently       Review of Systems   Constitutional: Negative for appetite change, chills, diaphoresis, fatigue and fever. HENT: Negative for trouble swallowing. Eyes: Negative for visual disturbance. Respiratory: Positive for chest tightness. Negative for cough, shortness of breath and wheezing. Cardiovascular: Positive for chest pain. Negative for palpitations, orthopnea, claudication, leg swelling, syncope, PND and near-syncope. Gastrointestinal: Negative for abdominal pain, anorexia, constipation, diarrhea, heartburn, nausea and vomiting. Genitourinary: Negative for dysuria. Musculoskeletal: Negative for back pain, neck pain and neck stiffness. Skin: Negative for rash and wound. Neurological: Negative for dizziness, seizures, syncope, weakness, light-headedness, numbness and headaches. Psychiatric/Behavioral: Negative for confusion. All other systems reviewed and are negative. Physical Exam  Physical Exam  Vitals and nursing note reviewed. Constitutional:       General: She is not in acute distress. Appearance: Normal appearance. She is obese. She is not ill-appearing, toxic-appearing or diaphoretic. HENT:      Head: Normocephalic and atraumatic. Nose: Nose normal. No congestion or rhinorrhea. Mouth/Throat:      Mouth: Mucous membranes are moist.      Pharynx: No oropharyngeal exudate or posterior oropharyngeal erythema. Eyes:      General: No scleral icterus. Right eye: No discharge. Left eye: No discharge. Extraocular Movements: Extraocular movements intact. Pupils: Pupils are equal, round, and reactive to light. Cardiovascular:      Rate and Rhythm: Normal rate and regular rhythm. Pulses: Normal pulses. Heart sounds: Normal heart sounds. No murmur heard. No friction rub. No gallop.    Pulmonary:      Effort: Pulmonary effort is normal. No respiratory distress. Breath sounds: Normal breath sounds. No stridor. No wheezing, rhonchi or rales. Comments: Lungs are clear to auscultation bilaterally without adventitious breath sounds  Chest:      Chest wall: No tenderness. Abdominal:      General: Abdomen is flat. There is no distension. Palpations: Abdomen is soft. Tenderness: There is no abdominal tenderness. There is no right CVA tenderness, left CVA tenderness, guarding or rebound. Comments: Abdomen is soft, nontender, nondistended, and without organomegaly; no CVA tenderness bilaterally   Musculoskeletal:         General: Normal range of motion. Cervical back: Normal range of motion. No tenderness. Right lower leg: No edema. Left lower leg: No edema. Skin:     General: Skin is warm and dry. Capillary Refill: Capillary refill takes less than 2 seconds. Coloration: Skin is not jaundiced or pale. Neurological:      General: No focal deficit present. Mental Status: She is alert and oriented to person, place, and time. Mental status is at baseline.    Psychiatric:         Mood and Affect: Mood normal.         Behavior: Behavior normal.         Vital Signs  ED Triage Vitals [08/14/23 1027]   Temperature Pulse Respirations Blood Pressure SpO2   98.2 °F (36.8 °C) 77 12 156/76 98 %      Temp Source Heart Rate Source Patient Position - Orthostatic VS BP Location FiO2 (%)   Oral Monitor Lying Right arm --      Pain Score       10 - Worst Possible Pain           Vitals:    08/14/23 1027 08/14/23 1259   BP: 156/76 119/78   Pulse: 77 71   Patient Position - Orthostatic VS: Lying Sitting         Visual Acuity      ED Medications  Medications   iohexol (OMNIPAQUE) 350 MG/ML injection (SINGLE-DOSE) 100 mL (100 mL Intravenous Given 8/14/23 1133)       Diagnostic Studies  Results Reviewed     Procedure Component Value Units Date/Time    HS Troponin I 2hr [[id number]]  (Normal) Collected: 08/14/23 1353    Lab Status: Final result Specimen: Blood from Arm, Right Updated: 08/14/23 1422     hs TnI 2hr 5 ng/L      Delta 2hr hsTnI 1 ng/L     B-Type Natriuretic Peptide(BNP) [[id number]]  (Normal) Collected: 08/14/23 1044    Lab Status: Final result Specimen: Blood from Arm, Left Updated: 08/14/23 1121     BNP 25 pg/mL     HS Troponin 0hr (reflex protocol) [[id number]]  (Normal) Collected: 08/14/23 1044    Lab Status: Final result Specimen: Blood from Arm, Left Updated: 08/14/23 1117     hs TnI 0hr 4 ng/L     Comprehensive metabolic panel [[id number]] Collected: 08/14/23 1044    Lab Status: Final result Specimen: Blood from Arm, Left Updated: 08/14/23 1115     Sodium 138 mmol/L      Potassium 4.4 mmol/L      Chloride 103 mmol/L      CO2 26 mmol/L      ANION GAP 9 mmol/L      BUN 13 mg/dL      Creatinine 1.07 mg/dL      Glucose 106 mg/dL      Calcium 9.4 mg/dL      AST 18 U/L      ALT 18 U/L      Alkaline Phosphatase 77 U/L      Total Protein 7.5 g/dL      Albumin 4.2 g/dL      Total Bilirubin 0.64 mg/dL      eGFR 57 ml/min/1.73sq m     Narrative:      [place] guidelines for Chronic Kidney Disease (CKD):   •  Stage 1 with normal or high GFR (GFR > 90 mL/min/1.73 square meters)  •  Stage 2 Mild CKD (GFR = 60-89 mL/min/1.73 square meters)  •  Stage 3A Moderate CKD (GFR = 45-59 mL/min/1.73 square meters)  •  Stage 3B Moderate CKD (GFR = 30-44 mL/min/1.73 square meters)  •  Stage 4 Severe CKD (GFR = 15-29 mL/min/1.73 square meters)  •  Stage 5 End Stage CKD (GFR <15 mL/min/1.73 square meters)  Note: GFR calculation is accurate only with a steady state creatinine    Lipase [[id number]]  (Normal) Collected: 08/14/23 1044    Lab Status: Final result Specimen: Blood from Arm, Left Updated: 08/14/23 1115     Lipase 14 u/L     CBC and differential [[id number]] Collected: 08/14/23 1044    Lab Status: Final result Specimen: Blood from Arm, Left Updated: 08/14/23 1053     WBC 5.76 Thousand/uL      RBC 4.88 Million/uL      Hemoglobin 14.9 g/dL      Hematocrit 44.3 %      MCV 91 fL      MCH 30.5 pg      MCHC 33.6 g/dL      RDW 12.5 %      MPV 10.1 fL      Platelets 613 Thousands/uL      nRBC 0 /100 WBCs      Neutrophils Relative 69 %      Immat GRANS % 1 %      Lymphocytes Relative 20 %      Monocytes Relative 7 %      Eosinophils Relative 2 %      Basophils Relative 1 %      Neutrophils Absolute 3.96 Thousands/µL      Immature Grans Absolute 0.05 Thousand/uL      Lymphocytes Absolute 1.16 Thousands/µL      Monocytes Absolute 0.39 Thousand/µL      Eosinophils Absolute 0.14 Thousand/µL      Basophils Absolute 0.06 Thousands/µL                  CTA ED chest PE Study   Final Result by Gordon Smallwood MD (08/14 1152)   1. No PE. 2. Right greater than left patchy opacities concerning for infiltrates. Workstation performed: [id number]                    Procedures  ECG 12 Lead Documentation Only    Date/Time: 8/14/2023 10:42 AM    Performed by: Laura Perez PA-C  Authorized by: Laura Perez PA-C    Indications / Diagnosis:  Chest Pain  Patient location:  ED  Previous ECG:     Previous ECG:  Unavailable  Interpretation:     Interpretation: normal    Rate:     ECG rate:  84    ECG rate assessment: normal    Rhythm:     Rhythm: sinus rhythm    Ectopy:     Ectopy: none    QRS:     QRS axis:  Normal  Conduction:     Conduction: normal    ST segments:     ST segments:  Normal  T waves:     T waves: normal    Comments:      Normal sinus rhythm with a rate of 84 without any ST segment changes or signs of ischemia. Normal axis. No ectopy.              ED Course             HEART Risk Score    Flowsheet Row Most Recent Value   Heart Score Risk Calculator    History 1 Filed at: 08/14/2023 1406   ECG 0 Filed at: 08/14/2023 1406   Age 1 Filed at: 08/14/2023 1406   Risk Factors 2 Filed at: 08/14/2023 1406   Troponin 0 Filed at: 08/14/2023 1406   HEART Score 4 Filed at: 08/14/2023 1406 SBIRT 20yo+    Flowsheet Row Most Recent Value   Initial Alcohol Screen: US AUDIT-C     1. How often do you have a drink containing alcohol? 0 Filed at: 08/14/2023 1030   2. How many drinks containing alcohol do you have on a typical day you are drinking? 0 Filed at: 08/14/2023 1030   3a. Male UNDER 65: How often do you have five or more drinks on one occasion? 0 Filed at: 08/14/2023 1030   3b. FEMALE Any Age, or MALE 65+: How often do you have 4 or more drinks on one occassion? 0 Filed at: 08/14/2023 1030   Audit-C Score 0 Filed at: 08/14/2023 1030   DIEGO: How many times in the past year have you. .. Used an illegal drug or used a prescription medication for non-medical reasons? Never Filed at: 08/14/2023 1030                    Medical Decision Making  This is a 48-year-old female presenting to the emergency department today for right-sided chest pain. It began 3 days ago. It feels similar to prior pulmonary emboli. Her vital signs are stable. On physical examination, the patient has no tenderness to palpation throughout her abdomen. She has no CVA tenderness bilaterally. She has no dysuria, hematuria, or foul-smelling urine. She overall denies urinary symptoms. EKG shows normal sinus rhythm with a rate of 84. Initial troponin is 4 with repeat troponin of 5. Other labs are reassuring. GFR appears stable from prior lab work-up. CT PE study negative for any acute pulmonary emboli. Lung infiltrates noted. The patient again notes no urinary symptoms. Heart score is 4. The patient is stable for discharge at this time. Referral placed for cardiology. Doxycycline sent to the patient's pharmacy for lung infiltrates. Follow-up with PCP. Strict return precautions were given. Recommend PCP follow-up as soon as possible. The patient and/or patient's proxy verify their understanding and agree to the plan at this time.   All questions answered to the patient and/or their proxy's satisfaction. All labs reviewed and utilized in the medical decision making process (if labs were ordered). Portions of the record may have been created with voice recognition software.  Occasional wrong word or "sound a like" substitutions may have occurred due to the inherent limitations of voice recognition software.  Read the chart carefully and recognize, using context, where substitutions have occurred. Lung infiltrate on CT: complicated acute illness or injury  Right-sided chest pain: complicated acute illness or injury  Amount and/or Complexity of Data Reviewed  External Data Reviewed: notes. Labs: ordered. Decision-making details documented in ED Course. Radiology: ordered. Decision-making details documented in ED Course. ECG/medicine tests: ordered and independent interpretation performed. Decision-making details documented in ED Course. Risk  Prescription drug management. Disposition  Final diagnoses:   Right-sided chest pain   Lung infiltrate on CT     Time reflects when diagnosis was documented in both MDM as applicable and the Disposition within this note     Time User Action Codes Description Comment    8/14/2023  2:41 PM Vicenta Estrada Add [R07.9] Right-sided chest pain     8/14/2023  2:45 PM Vicenta Estrada Add [R91.8] Lung infiltrate on CT       ED Disposition     ED Disposition   Discharge    Condition   Stable    Date/Time   Mon Aug 14, 2023 1265 [facility] discharge to home/self care.                Follow-up Information     Follow up With Specialties Details Why Contact Info Additional [address] [facility] APPLE Saucedo Nurse Practitioner Schedule an appointment as soon as possible for a visit   [address] Male [address] [facility]  [address] [facility] [address]  [facility] Emergency Department Emergency Medicine Go to  If symptoms worsen [address] [id number] [address] [facility] Emergency Department, [address] [facility], [address] [facility] [address]    Jaden Rawls MD Cardiology, Cardiology Imaging, Multidisciplinary Schedule an appointment as soon as possible for a visit   [place] [address]  [phone]             Discharge Medication List as of 8/14/2023  2:44 PM      CONTINUE these medications which have NOT CHANGED    Details   aspirin 81 mg chewable tablet Chew 81 mg daily, Historical Med      buPROPion (WELLBUTRIN SR) 100 mg 12 hr tablet Take 100 mg by mouth daily, Starting Tue 9/6/2022, Historical Med      DULoxetine (CYMBALTA) 60 mg delayed release capsule Take 60 mg by mouth daily, Starting Fri 6/3/2022, Historical Med      gabapentin (Neurontin) 100 mg capsule Take 1 capsule (100 mg total) by mouth 2 (two) times a day, Starting Wed 8/2/2023, Normal      levothyroxine 50 mcg tablet Take 1 tablet (50 mcg total) by mouth daily in the early morning, Starting Tue 3/7/2023, Normal      omeprazole (PriLOSEC) 20 mg delayed release capsule TAKE 1 CAPSULE BY MOUTH EVERY DAY, Starting Sat 8/5/2023, Normal      traMADol (Ultram) 50 mg tablet Take 1 tablet (50 mg total) by mouth every 8 (eight) hours as needed for moderate pain, Starting Wed 8/2/2023, Normal      acetaminophen (TYLENOL) 325 mg tablet Take 2 tablets (650 mg total) by mouth every 6 (six) hours as needed for mild pain, Starting Mon 9/12/2022, Normal      albuterol (2.5 mg/3 mL) 0.083 % nebulizer solution Inhale 1 each, Starting Tue 2/19/2013, Historical Med      albuterol (PROVENTIL HFA,VENTOLIN HFA) 90 mcg/act inhaler Inhale 2 puffs every 6 (six) hours as needed for wheezing or shortness of breath, Starting Tue 11/17/2020, Normal      benzonatate (TESSALON PERLES) 100 mg capsule Take 1 capsule (100 mg total) by mouth 3 (three) times a day as needed for cough, Starting Tue 11/29/2022, Normal      !! betamethasone, augmented, (DIPROLENE-AF) 0.05 % cream Apply topically 2 (two) times a day, Starting Tue 3/7/2023, Normal      !! betamethasone, augmented, (DIPROLENE-AF) 0.05 % cream Apply topically 2 (two) times a day, Starting Tue 6/13/2023, Normal      lidocaine (LIDODERM) 5 % Apply 2 patches topically daily for 10 days Remove & Discard patch within 12 hours or as directed by MD, Starting Tue 9/13/2022, Until Fri 9/23/2022, Normal       !! - Potential duplicate medications found. Please discuss with provider.               PDMP Review     None          ED Provider  Electronically Signed by           Alfredo Fisher PA-C  08/14/23 8090

## 2023-08-14 NOTE — DISCHARGE INSTRUCTIONS
Please return to the emergency department for worsening symptoms including chest pain, shortness of breath, dizziness, lightheadedness, fever greater than 103, severe pain, inability to walk, fainting episodes, etc.. Please follow-up with your family practice provider as soon as possible. Follow-up with cardiology. I have placed a referral.  If symptoms persist or you develop urinary symptoms such as painful urination, blood with urination, or other urinary symptoms, please return to the emergency department. You have infiltrates in your lungs. This may represent infection however you opted for no antibiotics at this time. If you get a fever, worsening cough, or other signs of pneumonia or infection, please return to the emergency department or seek medical attention.

## 2023-08-14 NOTE — TELEPHONE ENCOUNTER
Patient called and just wanted you to be aware that she went to the ED this morning as suggested They did some testing on her and nothing was found. She just wanted you to be aware that she is still having pain and does not know what else to do, She has been referred to cardiology.  Please advise

## 2023-08-15 NOTE — TELEPHONE ENCOUNTER
They did actually find something she has R lung infiltrates that is why they give her the doxycycline. She should continue with that medication and she should definitely keep the appointment with cardiology if she needs us to make the appointment or she already has an established appointment just let me know.

## 2023-08-17 ENCOUNTER — OFFICE VISIT (OUTPATIENT)
Dept: OBGYN CLINIC | Facility: MEDICAL CENTER | Age: 58
End: 2023-08-17
Payer: COMMERCIAL

## 2023-08-17 DIAGNOSIS — M17.12 PRIMARY OSTEOARTHRITIS OF LEFT KNEE: Primary | ICD-10-CM

## 2023-08-17 DIAGNOSIS — E66.01 MORBID OBESITY WITH BMI OF 60.0-69.9, ADULT (HCC): ICD-10-CM

## 2023-08-17 LAB
ATRIAL RATE: 84 BPM
P AXIS: 30 DEGREES
PR INTERVAL: 158 MS
QRS AXIS: 25 DEGREES
QRSD INTERVAL: 72 MS
QT INTERVAL: 360 MS
QTC INTERVAL: 425 MS
T WAVE AXIS: 23 DEGREES
VENTRICULAR RATE: 84 BPM

## 2023-08-17 PROCEDURE — 93010 ELECTROCARDIOGRAM REPORT: CPT | Performed by: INTERNAL MEDICINE

## 2023-08-17 PROCEDURE — 99213 OFFICE O/P EST LOW 20 MIN: CPT | Performed by: PHYSICAL MEDICINE & REHABILITATION

## 2023-08-17 PROCEDURE — 20611 DRAIN/INJ JOINT/BURSA W/US: CPT | Performed by: PHYSICAL MEDICINE & REHABILITATION

## 2023-08-17 RX ORDER — ROPIVACAINE HYDROCHLORIDE 5 MG/ML
10 INJECTION, SOLUTION EPIDURAL; INFILTRATION; PERINEURAL
Status: COMPLETED | OUTPATIENT
Start: 2023-08-17 | End: 2023-08-17

## 2023-08-17 RX ORDER — TRIAMCINOLONE ACETONIDE 40 MG/ML
80 INJECTION, SUSPENSION INTRA-ARTICULAR; INTRAMUSCULAR
Status: COMPLETED | OUTPATIENT
Start: 2023-08-17 | End: 2023-08-17

## 2023-08-17 RX ADMIN — ROPIVACAINE HYDROCHLORIDE 10 ML: 5 INJECTION, SOLUTION EPIDURAL; INFILTRATION; PERINEURAL at 11:15

## 2023-08-17 RX ADMIN — TRIAMCINOLONE ACETONIDE 80 MG: 40 INJECTION, SUSPENSION INTRA-ARTICULAR; INTRAMUSCULAR at 11:15

## 2023-08-17 NOTE — PROGRESS NOTES
1. Primary osteoarthritis of left knee        2. Morbid obesity with BMI of 60.0-69.9, adult ([address])          Orders Placed This Encounter   Procedures   • Large joint arthrocentesis        Impression:  Patient is here in follow up of left knee osteoarthritis. The patient is a good candidate for repeat ultrasound guided left knee IA injection (last 2023) due to suboptimal body habitus. Please see procedure note below. Patient tolerated the procedure and had immediate relief of symptoms. Can consider repeat injection in 3 months or after if patient does well.     Imaging Studies (I personally reviewed images in PACS and report):  Left knee x-rays most recent to this encounter reviewed. These images show severe tricompartmental osteoarthritis. No follow-ups on file. Patient is in agreement with the above plan. HPI:  Gagan Moore is a 61 y.o. female  who presents in follow up. Here for No chief complaint on file. Since last visit: See above. Following history reviewed and updated:  Past Medical History:   Diagnosis Date   • Anxiety    • Asthma    • Chronic fatigue    • COPD (chronic obstructive pulmonary disease) ([facility])    • Depression    • Fibromyalgia    • Head injury    • Hypothyroid    • Morbid obesity with BMI of 60.0-69.9, adult ([facility]CC)    • Sleep apnea     PT states a very mild case, prescribed CPAP but does not use machine.      Past Surgical History:   Procedure Laterality Date   • APPENDECTOMY     •  SECTION     • ESOPHAGOGASTRODUODENOSCOPY     • HERNIA REPAIR      Primary repair of incarcerated umbilical hernia - Dr. Annette Baldwin    • HYSTERECTOMY      Hysterectomy/revise vagina   • KNEE SURGERY      Arthrotomy with open meniscus repair    • LAPAROSCOPIC CHOLECYSTECTOMY     • TONSILECTOMY AND ADNOIDECTOMY     • TUMOR REMOVAL      head   • URETER SURGERY      repair   • WISDOM TOOTH EXTRACTION       Social History   Social History     Substance and Sexual Activity   Alcohol Use Not Currently    Comment: rarely     Social History     Substance and Sexual Activity   Drug Use Not Currently     Social History     Tobacco Use   Smoking Status Former   • Years: 23.50   • Types: Cigarettes   Smokeless Tobacco Never   Tobacco Comments    3= PPD - As per Steven Paredes      Family History   Problem Relation Age of Onset   • Depression Mother    • Anxiety disorder Mother    • No Known Problems Father    • No Known Problems Sister    • No Known Problems Maternal Grandmother    • No Known Problems Maternal Grandfather    • Breast cancer Paternal Grandmother    • Esophageal cancer Paternal Grandfather    • Skin cancer Paternal Grandfather    • Diabetes Brother    • Hypertension Brother    • No Known Problems Son    • Diabetes Family    • Heart disease Family    • Stroke Neg Hx    • Thyroid disease Neg Hx      Allergies   Allergen Reactions   • Ciprofloxacin Other (See Comments)     Reaction Date: [id number];      • Penicillins Other (See Comments)   • Advil [Ibuprofen] Hives and Rash        Constitutional:  There were no vitals taken for this visit. General: NAD. Eyes: Clear sclerae. ENT: No inflammation, lesion, or mass of lips. No tracheal deviation. Musculoskeletal: As mentioned below. Integumentary: No visible rashes or skin lesions. Pulmonary/Chest: Effort normal. No respiratory distress. Neuro: CN's grossly intact, GONZALEZ. Psych: Normal affect and judgement. Vascular: WWP. Left Knee Exam     Tenderness   The patient is experiencing tenderness in the medial joint line, lateral joint line, medial retinaculum and patellar tendon. Range of Motion   Extension: normal   Flexion:  110 abnormal     Tests   Varus: negative Valgus: negative    Other   Erythema: absent  Scars: absent  Sensation: normal  Pulse: present             Large joint arthrocentesis: L knee  Universal Protocol:  Procedure performed by:  Consent: Verbal consent obtained. Written consent not obtained.   Consent given by: patient  Timeout called at: 8/17/2023 11:21 AM.  Patient understanding: patient states understanding of the procedure being performed  Site marked: the operative site was marked  Radiology Images displayed and confirmed. If images not available, report reviewed: imaging studies available  Patient identity confirmed: verbally with patient    Supporting Documentation  Indications: pain and diagnostic evaluation   Procedure Details  Location: knee - L knee  Ultrasound guidance: yes (US guidance was used to find the area of interest.)  Medications administered: 80 mg triamcinolone acetonide 40 mg/mL; 10 mL ropivacaine 0.5 %    Patient tolerance: patient tolerated the procedure well with no immediate complications  Dressing:  Sterile dressing applied    The left knee IA space was visualized with ultrasound and injected with steroid/anesthetic solution as indicated. Prior to the injection, the ultrasound was used to evaluate for any neural or vascular structures. Care was taken to avoid these structures. The images (and video if taken) were saved to the Ther[employer] ultrasound system. Risks of this procedure include:    - Risk of bleeding since a needle is involved. - Risk of infection (1/10,000 chance as per recent studies). Signs/symptoms were discussed and they would prompt an urgent evaluation at an emergency department.  - Risk of pigmentation or skin dimpling in the skin (2-3% chance as per recent studies) from the steroid. - Risk of increased pain from steroid flare (1% chance as per recent studies) that typically lasts 24-48 hours. - Risk of increased blood sugars from the steroid medication that can last for a few weeks. If the patient is a diabetic or pre-diabetic, they were encouraged to closely monitor their blood sugars and discuss with PCP if elevated more than usual or if having symptoms. We decided that the benefits outweigh the risks and so we proceeded with the procedure.

## 2023-08-24 ENCOUNTER — OFFICE VISIT (OUTPATIENT)
Dept: FAMILY MEDICINE CLINIC | Facility: CLINIC | Age: 58
End: 2023-08-24
Payer: COMMERCIAL

## 2023-08-24 VITALS
OXYGEN SATURATION: 97 % | BODY MASS INDEX: 51.91 KG/M2 | WEIGHT: 293 LBS | TEMPERATURE: 97.3 F | RESPIRATION RATE: 18 BRPM | SYSTOLIC BLOOD PRESSURE: 124 MMHG | HEART RATE: 90 BPM | DIASTOLIC BLOOD PRESSURE: 80 MMHG | HEIGHT: 63 IN

## 2023-08-24 DIAGNOSIS — Z00.01 ENCOUNTER FOR ROUTINE ADULT HEALTH EXAMINATION WITH ABNORMAL FINDINGS: Primary | ICD-10-CM

## 2023-08-24 PROCEDURE — 99396 PREV VISIT EST AGE 40-64: CPT | Performed by: NURSE PRACTITIONER

## 2023-08-24 NOTE — PROGRESS NOTES
Name: Omkar Amos      : 1965      MRN: [id number]  Encounter Provider: APPLE Wise  Encounter Date: 2023   Encounter department: [address] [place] [address] Avenue     1. Encounter for routine adult health examination with abnormal findings    Physical assessment demonstrates a morbidly obese 79-year-old female who is an excellent historian. She does have multiple musculoskeletal complaints however her weight at 355 pounds has become arduous for her to transport herself through life. Patient has back pain knee pain but feels her pain is more well controlled recently. She states she is adopted a lower carbohydrate diet. Otherwise vital signs are well within normal limits as previously stated with exception of the weight all questions were answered she is due for routine labs which she did not complete today she is to complete those and I will contact her with the results when available. She is not due for any refills on medication. No medication changes will be made today. Subjective        Patient is here for routine follow-up. To review most recent labs. To also discuss current state of health and any new problems that they may be experiencing. Patient states that medications taken as prescribed and very well tolerated no new complaints at this time. Review of Systems   Constitutional: Negative for appetite change and fever. HENT: Negative for sinus pressure and sore throat. Eyes: Negative for pain. Respiratory: Negative for shortness of breath. Cardiovascular: Negative for chest pain. Gastrointestinal: Negative for abdominal pain. Genitourinary: Negative for dysuria. Musculoskeletal: Positive for arthralgias, back pain, gait problem and myalgias. Skin: Negative for color change. Neurological: Negative for light-headedness. Psychiatric/Behavioral: Negative for behavioral problems.        Current Outpatient Medications on File Prior to Visit   Medication Sig   • acetaminophen (TYLENOL) 325 mg tablet Take 2 tablets (650 mg total) by mouth every 6 (six) hours as needed for mild pain   • albuterol (2.5 mg/3 mL) 0.083 % nebulizer solution Inhale 1 each   • albuterol (PROVENTIL HFA,VENTOLIN HFA) 90 mcg/act inhaler Inhale 2 puffs every 6 (six) hours as needed for wheezing or shortness of breath   • aspirin 81 mg chewable tablet Chew 81 mg daily   • buPROPion (WELLBUTRIN SR) 100 mg 12 hr tablet Take 100 mg by mouth daily   • DULoxetine (CYMBALTA) 60 mg delayed release capsule Take 60 mg by mouth daily   • gabapentin (Neurontin) 100 mg capsule Take 1 capsule (100 mg total) by mouth 2 (two) times a day   • levothyroxine 50 mcg tablet Take 1 tablet (50 mcg total) by mouth daily in the early morning   • omeprazole (PriLOSEC) 20 mg delayed release capsule TAKE 1 CAPSULE BY MOUTH EVERY DAY   • traMADol (Ultram) 50 mg tablet Take 1 tablet (50 mg total) by mouth every 8 (eight) hours as needed for moderate pain   • betamethasone, augmented, (DIPROLENE-AF) 0.05 % cream Apply topically 2 (two) times a day (Patient not taking: Reported on 8/2/2023)   • lidocaine (LIDODERM) 5 % Apply 2 patches topically daily for 10 days Remove & Discard patch within 12 hours or as directed by MD   • [DISCONTINUED] benzonatate (TESSALON PERLES) 100 mg capsule Take 1 capsule (100 mg total) by mouth 3 (three) times a day as needed for cough (Patient not taking: Reported on 6/13/2023)   • [DISCONTINUED] betamethasone, augmented, (DIPROLENE-AF) 0.05 % cream Apply topically 2 (two) times a day (Patient not taking: Reported on 6/13/2023)       Objective     /80 (BP Location: Left arm, Patient Position: Sitting, Cuff Size: Large)   Pulse 90   Temp (!) 97.3 °F (36.3 °C) (Temporal)   Resp 18   Ht 5' 3" (1.6 m)   Wt (!) 161 kg (355 lb)   SpO2 97%   BMI 62.89 kg/m²     Physical Exam  Vitals and nursing note reviewed.    Constitutional:       General: She is not in acute distress. Appearance: She is well-developed. She is obese. She is not diaphoretic. HENT:      Head: Normocephalic and atraumatic. Right Ear: Tympanic membrane and external ear normal.      Left Ear: Tympanic membrane and external ear normal.      Mouth/Throat:      Pharynx: Oropharynx is clear. Cardiovascular:      Rate and Rhythm: Normal rate and regular rhythm. Heart sounds: Normal heart sounds. Pulmonary:      Effort: Pulmonary effort is normal.      Breath sounds: Normal breath sounds. Abdominal:      Palpations: Abdomen is soft. Musculoskeletal:      Cervical back: Neck supple. Skin:     General: Skin is dry. Neurological:      Mental Status: She is alert and oriented to person, place, and time. Psychiatric:         Behavior: Behavior normal.         Thought Content:  Thought content normal.       APPLE Salomon

## 2023-09-09 DIAGNOSIS — E03.8 OTHER SPECIFIED HYPOTHYROIDISM: ICD-10-CM

## 2023-09-09 DIAGNOSIS — E01.0 THYROMEGALY: ICD-10-CM

## 2023-09-09 RX ORDER — LEVOTHYROXINE SODIUM 0.05 MG/1
50 TABLET ORAL
Qty: 90 TABLET | Refills: 1 | Status: SHIPPED | OUTPATIENT
Start: 2023-09-09

## 2023-10-11 ENCOUNTER — OFFICE VISIT (OUTPATIENT)
Dept: CARDIOLOGY CLINIC | Facility: MEDICAL CENTER | Age: 58
End: 2023-10-11
Payer: COMMERCIAL

## 2023-10-11 VITALS
DIASTOLIC BLOOD PRESSURE: 80 MMHG | OXYGEN SATURATION: 96 % | SYSTOLIC BLOOD PRESSURE: 122 MMHG | BODY MASS INDEX: 51.91 KG/M2 | HEIGHT: 63 IN | WEIGHT: 293 LBS | HEART RATE: 99 BPM

## 2023-10-11 DIAGNOSIS — R07.9 RIGHT-SIDED CHEST PAIN: Primary | ICD-10-CM

## 2023-10-11 DIAGNOSIS — R06.02 SHORTNESS OF BREATH: ICD-10-CM

## 2023-10-11 DIAGNOSIS — E66.01 MORBID OBESITY (HCC): ICD-10-CM

## 2023-10-11 PROCEDURE — 99215 OFFICE O/P EST HI 40 MIN: CPT | Performed by: INTERNAL MEDICINE
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
## 2023-10-12 ENCOUNTER — OFFICE VISIT (OUTPATIENT)
Dept: FAMILY MEDICINE CLINIC | Facility: CLINIC | Age: 58
End: 2023-10-12
Payer: COMMERCIAL

## 2023-10-12 VITALS
HEIGHT: 63 IN | OXYGEN SATURATION: 96 % | BODY MASS INDEX: 51.91 KG/M2 | WEIGHT: 293 LBS | TEMPERATURE: 97.2 F | DIASTOLIC BLOOD PRESSURE: 66 MMHG | RESPIRATION RATE: 18 BRPM | SYSTOLIC BLOOD PRESSURE: 120 MMHG | HEART RATE: 77 BPM

## 2023-10-12 DIAGNOSIS — L70.9 ADULT ACNE: Primary | ICD-10-CM

## 2023-10-12 PROCEDURE — 99213 OFFICE O/P EST LOW 20 MIN: CPT | Performed by: NURSE PRACTITIONER

## 2023-10-12 RX ORDER — BUPROPION HYDROCHLORIDE 300 MG/1
300 TABLET ORAL EVERY MORNING
Qty: 90 TABLET | Refills: 3 | Status: SHIPPED | OUTPATIENT
Start: 2023-10-12 | End: 2024-10-06

## 2023-10-12 RX ORDER — CLINDAMYCIN PHOSPHATE 10 MG/G
GEL TOPICAL 2 TIMES DAILY
Qty: 30 G | Refills: 1 | Status: SHIPPED | OUTPATIENT
Start: 2023-10-12

## 2023-10-12 NOTE — PROGRESS NOTES
Assessment/Plan:      Diagnoses and all orders for this visit:    Adult acne  -     clindamycin (CLINDAGEL) 1 % gel; Apply topically 2 (two) times a day  Minor facial acne adult type. Possibly mixed with rosacea did advise clindamycin gel to be applied topically to a clean face 2 times daily preferably at night at least once daily if possible. BMI 60.0-69.9, adult (HCC)  -     buPROPion (WELLBUTRIN XL) 300 mg 24 hr tablet; Take 1 tablet (300 mg total) by mouth every morning  Several weight loss measures have been discussed throughout patient's experience in the office. Multiple referrals for weight management and bariatrics have been given patient has spent some time in bariatrics but is not interested in having any type of surgery. Did offer bariatric referral again today she declined at this time. She has tolerated bupropion 200 mg/day very well did advise we will increase to 300 mg extended release Wellbutrin type to be taken once daily would like to see her back in the office in 1 month for follow-up how she tolerates the medication and for possible weight check. Dosing all possible side effects of the prescribed medications or medications that had been prescribed in the past were reviewed and all questions were answered. Patient verbalized agreement and understanding of the plan of care as outlined during the office visit today return to office as indicated or sooner if a problem arises. Subjective:     Patient ID: Emmie Zuleta is a 61 y.o. female. Patient is here to discuss possible weight loss options. She also has incidental complaint of facial acne would like to be assessed and possibly treated. She denies any nausea vomiting diarrhea chest pains or shortness of breath. Review of Systems   Constitutional:  Negative for appetite change and fever. HENT:  Negative for sinus pressure and sore throat. Eyes:  Negative for pain. Respiratory:  Negative for shortness of breath. Cardiovascular:  Negative for chest pain. Gastrointestinal:  Negative for abdominal pain. Genitourinary:  Negative for dysuria. Musculoskeletal:  Negative for arthralgias and myalgias. Skin:  Positive for rash (Facial of acne type. ). Negative for color change. Neurological:  Negative for light-headedness. Psychiatric/Behavioral:  Negative for behavioral problems. Objective:     Physical Exam  Constitutional:       Appearance: She is obese. Cardiovascular:      Rate and Rhythm: Normal rate and regular rhythm. Heart sounds: Normal heart sounds. Pulmonary:      Effort: Pulmonary effort is normal.      Breath sounds: Normal breath sounds. Neurological:      Mental Status: She is alert.

## 2023-11-07 ENCOUNTER — NURSE TRIAGE (OUTPATIENT)
Age: 58
End: 2023-11-07

## 2023-11-07 NOTE — TELEPHONE ENCOUNTER
Reason for Disposition   Continuous (nonstop) coughing interferes with work or school and no improvement using cough treatment per Care Advice    Answer Assessment - Initial Assessment Questions  1. ONSET: "When did the cough begin?"       3 weeks ago   2. SEVERITY: "How bad is the cough today?"       It is constant   3. SPUTUM: "Describe the color of your sputum" (none, dry cough; clear, white, yellow, green)      White   4. HEMOPTYSIS: "Are you coughing up any blood?" If so ask: "How much?" (flecks, streaks, tablespoons, etc.)      no  5. DIFFICULTY BREATHING: "Are you having difficulty breathing?" If Yes, ask: "How bad is it?" (e.g., mild, moderate, severe)     - MILD: No SOB at rest, mild SOB with walking, speaks normally in sentences, can lay down, no retractions, pulse < 100.     - MODERATE: SOB at rest, SOB with minimal exertion and prefers to sit, cannot lie down flat, speaks in phrases, mild retractions, audible wheezing, pulse 100-120.     - SEVERE: Very SOB at rest, speaks in single words, struggling to breathe, sitting hunched forward, retractions, pulse > 120       no  6. FEVER: "Do you have a fever?" If Yes, ask: "What is your temperature, how was it measured, and when did it start?"      no  7. CARDIAC HISTORY: "Do you have any history of heart disease?" (e.g., heart attack, congestive heart failure)       no  8. LUNG HISTORY: "Do you have any history of lung disease?"  (e.g., pulmonary embolus, asthma, emphysema)      Yes   9. PE RISK FACTORS: "Do you have a history of blood clots?" (or: recent major surgery, recent prolonged travel, bedridden)      Yes   10. OTHER SYMPTOMS: "Do you have any other symptoms?" (e.g., runny nose, wheezing, chest pain)        no  11. PREGNANCY: "Is there any chance you are pregnant?" "When was your last menstrual period?"        no  12.  TRAVEL: "Have you traveled out of the country in the last month?" (e.g., travel history, exposures)        no    Protocols used: Cough-ADULT-[place]

## 2023-11-07 NOTE — TELEPHONE ENCOUNTER
----- Message from Krystal Finley sent at 11/7/2023  2:45 PM EST -----  Patient has multicolor phlegm, white puss coming out of throat, fever on and off (100.1), chest congestion, constant cough, sore throat.  Can be reached at [phone]

## 2023-11-09 ENCOUNTER — TELEMEDICINE (OUTPATIENT)
Dept: FAMILY MEDICINE CLINIC | Facility: CLINIC | Age: 58
End: 2023-11-09
Payer: COMMERCIAL

## 2023-11-09 DIAGNOSIS — J06.9 URI WITH COUGH AND CONGESTION: Primary | ICD-10-CM

## 2023-11-09 PROCEDURE — G2012 BRIEF CHECK IN BY MD/QHP: HCPCS | Performed by: NURSE PRACTITIONER

## 2023-11-09 RX ORDER — AZITHROMYCIN 250 MG/1
TABLET, FILM COATED ORAL
Qty: 6 TABLET | Refills: 0 | Status: SHIPPED | OUTPATIENT
Start: 2023-11-09 | End: 2023-11-13

## 2023-11-09 RX ORDER — BROMPHENIRAMINE MALEATE, PSEUDOEPHEDRINE HYDROCHLORIDE, AND DEXTROMETHORPHAN HYDROBROMIDE 2; 30; 10 MG/5ML; MG/5ML; MG/5ML
5 SYRUP ORAL 4 TIMES DAILY PRN
Qty: 200 ML | Refills: 0 | Status: SHIPPED | OUTPATIENT
Start: 2023-11-09

## 2023-11-09 NOTE — PROGRESS NOTES
Virtual Brief Visit    This Visit is being completed by telephone. The Patient is located at Home and in the following state in which I hold an active license [place]    The patient was identified by name and date of birth. Arpit Madden was informed that this is a telemedicine visit and that the visit is being conducted through Telephone. My office door was closed. No one else was in the room. She acknowledged consent and understanding of privacy and security of the video platform. The patient has agreed to participate and understands they can discontinue the visit at any time. Patient is aware this is a billable service. Assessment/Plan:    Problem List Items Addressed This Visit    None  Visit Diagnoses       URI with cough and congestion    -  Primary    Relevant Medications    azithromycin (Zithromax) 250 mg tablet    brompheniramine-pseudoephedrine-DM 30-2-10 MG/5ML syrup        61 yo female seen today virtually for uncomplicated uri most likely viral will treat empirically with zpak due to risk odfsecondary complication. Also bromfed to be taken as directed. Pt does have a fu on Monday of next week will reassess at that time. Any severe SOB seek emergency care. Dosing all possible side effects of the prescribed medications or medications that had been prescribed in the past were reviewed and all questions were answered. Patient verbalized agreement and understanding of the plan of care as outlined during the office visit today return to office as indicated or sooner if a problem arises. Recent Visits  No visits were found meeting these conditions. Showing recent visits within past 7 days and meeting all other requirements  Future Appointments  No visits were found meeting these conditions. Showing future appointments within next 150 days and meeting all other requirements         Visit Time  Total Visit Duration: 12 min.

## 2023-11-16 ENCOUNTER — TELEPHONE (OUTPATIENT)
Age: 58
End: 2023-11-16

## 2023-11-16 NOTE — TELEPHONE ENCOUNTER
brompheniramine-pseudoephedrine-DM 30-2-10 MG/5ML syrup [[id number]]           PATIENT STATED THAT SHE COULD NOT TAKE THIS MEDICATION BECAUSE IT BURNED HER MOUTH. PATIENT IS ASKING FOR AN ALTERNATIVE COUGH MEDICATION. SHE NEEDS SOMEONE FROM THE OFFICE TO CALL HER BACK TODAY.   Nicole Story

## 2023-11-17 DIAGNOSIS — R05.8 OTHER COUGH: Primary | ICD-10-CM

## 2023-11-17 RX ORDER — BENZONATATE 100 MG/1
100 CAPSULE ORAL 3 TIMES DAILY PRN
Qty: 60 CAPSULE | Refills: 0 | Status: SHIPPED | OUTPATIENT
Start: 2023-11-17

## 2023-11-30 DIAGNOSIS — L70.9 ADULT ACNE: ICD-10-CM

## 2023-12-01 RX ORDER — CLINDAMYCIN PHOSPHATE 10 MG/G
GEL TOPICAL 2 TIMES DAILY
Qty: 30 G | Refills: 1 | Status: SHIPPED | OUTPATIENT
Start: 2023-12-01

## 2024-01-31 DIAGNOSIS — E01.0 THYROMEGALY: ICD-10-CM

## 2024-01-31 DIAGNOSIS — E03.8 OTHER SPECIFIED HYPOTHYROIDISM: ICD-10-CM

## 2024-01-31 RX ORDER — LEVOTHYROXINE SODIUM 0.05 MG/1
50 TABLET ORAL
Qty: 90 TABLET | Refills: 1 | Status: SHIPPED | OUTPATIENT
Start: 2024-01-31

## 2024-02-15 DIAGNOSIS — R21 RASH IN ADULT: ICD-10-CM

## 2024-02-15 DIAGNOSIS — G62.9 NEUROPATHY: ICD-10-CM

## 2024-02-15 DIAGNOSIS — M17.12 PRIMARY OSTEOARTHRITIS OF LEFT KNEE: ICD-10-CM

## 2024-02-15 NOTE — TELEPHONE ENCOUNTER
Patient is requesting a refill of her medication    Gabapentin 100mg    Tramadol 50mg    [facility]

## 2024-02-22 DIAGNOSIS — G62.9 NEUROPATHY: ICD-10-CM

## 2024-02-22 RX ORDER — GABAPENTIN 100 MG/1
100 CAPSULE ORAL 2 TIMES DAILY
Qty: 60 CAPSULE | Refills: 2 | Status: SHIPPED | OUTPATIENT
Start: 2024-02-22

## 2024-02-22 RX ORDER — GABAPENTIN 100 MG/1
100 CAPSULE ORAL 2 TIMES DAILY
Qty: 60 CAPSULE | Refills: 2 | OUTPATIENT
Start: 2024-02-22

## 2024-02-22 RX ORDER — TRAMADOL HYDROCHLORIDE 50 MG/1
50 TABLET ORAL EVERY 8 HOURS PRN
Qty: 120 TABLET | Refills: 0 | OUTPATIENT
Start: 2024-02-22

## 2024-03-11 ENCOUNTER — OFFICE VISIT (OUTPATIENT)
Dept: FAMILY MEDICINE CLINIC | Facility: CLINIC | Age: 59
End: 2024-03-11
Payer: COMMERCIAL

## 2024-03-11 VITALS
TEMPERATURE: 98.2 F | HEART RATE: 86 BPM | DIASTOLIC BLOOD PRESSURE: 82 MMHG | HEIGHT: 63 IN | OXYGEN SATURATION: 95 % | RESPIRATION RATE: 18 BRPM | SYSTOLIC BLOOD PRESSURE: 120 MMHG | WEIGHT: 293 LBS | BODY MASS INDEX: 51.91 KG/M2

## 2024-03-11 DIAGNOSIS — E03.8 OTHER SPECIFIED HYPOTHYROIDISM: ICD-10-CM

## 2024-03-11 DIAGNOSIS — E66.01 MORBID OBESITY WITH BMI OF 60.0-69.9, ADULT (HCC): Primary | ICD-10-CM

## 2024-03-11 PROCEDURE — 99214 OFFICE O/P EST MOD 30 MIN: CPT | Performed by: NURSE PRACTITIONER

## 2024-03-11 RX ORDER — PERMETHRIN 50 MG/G
CREAM TOPICAL
COMMUNITY
Start: 2023-12-31

## 2024-03-11 RX ORDER — TIRZEPATIDE 2.5 MG/.5ML
2.5 INJECTION, SOLUTION SUBCUTANEOUS WEEKLY
Qty: 2 ML | Refills: 0 | Status: SHIPPED | OUTPATIENT
Start: 2024-03-11 | End: 2024-04-08

## 2024-03-12 ENCOUNTER — TELEPHONE (OUTPATIENT)
Age: 59
End: 2024-03-12

## 2024-03-12 NOTE — TELEPHONE ENCOUNTER
Patient called and states [facility] informed her that ZepBound needs a prior authorization. Patient would like a call back when approved or denied. [phone]

## 2024-03-12 NOTE — PROGRESS NOTES
Assessment/Plan:      Diagnoses and all orders for this visit:    Morbid obesity with BMI of 60.0-69.9, adult (HCC)  -     CBC and differential; Future  -     Comprehensive metabolic panel; Future  -     Lipid panel; Future  -     UA w Reflex to Microscopic w Reflex to Culture; Future  -     Hemoglobin A1C; Future  -     tirzepatide (Zepbound) 2.5 mg/0.5 mL auto-injector; Inject 0.5 mL (2.5 mg total) under the skin once a week for 28 days  Patient is continuing morbid obesity has gained an additional 20 pounds since last office visit.  Multiple referrals and recommendations have been for bariatrics patient states she is not interested in risk versus benefit were discussed we will attempt a GLP-1 injectable medication I would like to see her back in the office in 1 month after starting this medication.  Other specified hypothyroidism  -     TSH + Free T4; Future          Physical assessment unremarkable except where noted. Labs given to complete for review.  also VS were well controlled. Labs given is to complete now for possible fu discussion. RTO in 1 month or as needed or for next scheduled appt. All questions answered.    Dosing all possible side effects of the prescribed medications or medications that had been prescribed in the past were reviewed and all questions were answered.  Patient verbalized agreement and understanding of the plan of care as outlined during the office visit today return to office as indicated or sooner if a problem arises.    Subjective:     Patient ID: Janette Gold is a 58 y.o. female.    HPI    Review of Systems      Objective:     Physical Exam

## 2024-03-12 NOTE — TELEPHONE ENCOUNTER
PA for Zepbound    Submitted via    [x]CMM-KEY [id number]  []Surescripts-Case ID #    []Faxed to plan   []Other website    []Phone call Case ID #      Office notes sent, clinical questions answered. Awaiting determination    Turnaround time for your insurance to make a decision on your Prior Authorization can take 7-21 business days.

## 2024-03-14 DIAGNOSIS — R21 RASH IN ADULT: ICD-10-CM

## 2024-03-14 DIAGNOSIS — M17.12 PRIMARY OSTEOARTHRITIS OF LEFT KNEE: ICD-10-CM

## 2024-03-14 DIAGNOSIS — I26.94 MULTIPLE SUBSEGMENTAL PULMONARY EMBOLI WITHOUT ACUTE COR PULMONALE (HCC): ICD-10-CM

## 2024-03-14 RX ORDER — TRAMADOL HYDROCHLORIDE 50 MG/1
50 TABLET ORAL EVERY 8 HOURS PRN
Qty: 120 TABLET | Refills: 0 | Status: SHIPPED | OUTPATIENT
Start: 2024-03-14

## 2024-03-14 RX ORDER — LIDOCAINE 50 MG/G
2 PATCH TOPICAL DAILY
Qty: 20 PATCH | Refills: 0 | Status: SHIPPED | OUTPATIENT
Start: 2024-03-14 | End: 2024-03-24

## 2024-03-14 NOTE — TELEPHONE ENCOUNTER
PA for tirzepatide (Zepbound) 2.5 mg/0.5 mL auto-injector Approved     Date(s) approved 3/13/2024 - 9/12/2024      Patient advised by [] [employer] Message                      [x] Phone call       Pharmacy advised by [x]Fax                                     []Phone call    Approval letter scanned into Media Yes

## 2024-03-14 NOTE — TELEPHONE ENCOUNTER
Reason for call: lidocaine patch was given in hospital, please advise   [x] Refill   [] Prior Auth  [] Other:     Office:   [x] PCP/Provider -   [] Specialty/Provider -     Medication: lidocaine 5% patch, one every 12 hours. 20 patches  Tramadol 50 mg, one tab every 8 hours prn 120 tabs     [facility]    Does the patient have enough for 3 days?   [] Yes   [x] No - Send as HP to POD

## 2024-04-09 DIAGNOSIS — K21.9 GASTROESOPHAGEAL REFLUX DISEASE WITHOUT ESOPHAGITIS: ICD-10-CM

## 2024-04-09 RX ORDER — OMEPRAZOLE 20 MG/1
20 CAPSULE, DELAYED RELEASE ORAL DAILY
Qty: 90 CAPSULE | Refills: 1 | Status: SHIPPED | OUTPATIENT
Start: 2024-04-09

## 2024-04-11 ENCOUNTER — OFFICE VISIT (OUTPATIENT)
Dept: FAMILY MEDICINE CLINIC | Facility: CLINIC | Age: 59
End: 2024-04-11
Payer: COMMERCIAL

## 2024-04-11 VITALS
OXYGEN SATURATION: 98 % | SYSTOLIC BLOOD PRESSURE: 118 MMHG | BODY MASS INDEX: 51.91 KG/M2 | WEIGHT: 293 LBS | HEIGHT: 63 IN | DIASTOLIC BLOOD PRESSURE: 74 MMHG | TEMPERATURE: 97.2 F | HEART RATE: 82 BPM

## 2024-04-11 DIAGNOSIS — E66.01 MORBID OBESITY WITH BODY MASS INDEX (BMI) OF 60.0 TO 69.9 IN ADULT (HCC): Primary | ICD-10-CM

## 2024-04-11 DIAGNOSIS — R21 RASH: ICD-10-CM

## 2024-04-11 PROCEDURE — 99213 OFFICE O/P EST LOW 20 MIN: CPT | Performed by: NURSE PRACTITIONER

## 2024-04-11 RX ORDER — TIRZEPATIDE 5 MG/.5ML
5 INJECTION, SOLUTION SUBCUTANEOUS WEEKLY
Qty: 6 ML | Refills: 0 | Status: SHIPPED | OUTPATIENT
Start: 2024-04-11

## 2024-04-11 RX ORDER — BETAMETHASONE DIPROPIONATE 0.5 MG/G
CREAM TOPICAL 2 TIMES DAILY
Qty: 30 G | Refills: 0 | Status: SHIPPED | OUTPATIENT
Start: 2024-04-11

## 2024-04-17 ENCOUNTER — TELEPHONE (OUTPATIENT)
Age: 59
End: 2024-04-17

## 2024-04-17 NOTE — TELEPHONE ENCOUNTER
Patient called requesting refill for omeprazole. Patient made aware medication was refilled on 4/9/2024 for 90 with 1 refills to [facility] pharmacy in [place]. Patient instructed to contact the pharmacy to obtain refills of medication. Patient verbalized understanding.

## 2024-04-24 ENCOUNTER — TELEPHONE (OUTPATIENT)
Age: 59
End: 2024-04-24

## 2024-04-24 NOTE — TELEPHONE ENCOUNTER
Pt stated PCP has to redo pre auth due to dosage change. It was   tirzepatide (Zepbound) 2.5mg auto-injector  and suppose to be   tirzepatide (Zepbound) 5 mg/0.5 mL auto-injector. Please send the correct script to the [facility] on file. Please contact pt when this is done. Thank you for your help.

## 2024-05-21 ENCOUNTER — TELEPHONE (OUTPATIENT)
Age: 59
End: 2024-05-21

## 2024-05-21 NOTE — TELEPHONE ENCOUNTER
Patient called and states unable to get Zepbound 5 mg until like August. Would like to know if Zepbound 2.5 mg can be sent in. Also need Tramadol and Lidocaine patches sent in as well. [facility] [address] And if any questions [phone], patient states is ok to leave a voicemail

## 2024-05-22 DIAGNOSIS — R21 RASH IN ADULT: ICD-10-CM

## 2024-05-22 DIAGNOSIS — M17.12 PRIMARY OSTEOARTHRITIS OF LEFT KNEE: ICD-10-CM

## 2024-05-22 DIAGNOSIS — E66.01 MORBID OBESITY WITH BODY MASS INDEX (BMI) OF 60.0 TO 69.9 IN ADULT (HCC): ICD-10-CM

## 2024-05-22 DIAGNOSIS — I26.94 MULTIPLE SUBSEGMENTAL PULMONARY EMBOLI WITHOUT ACUTE COR PULMONALE (HCC): ICD-10-CM

## 2024-05-22 RX ORDER — LIDOCAINE 50 MG/G
2 PATCH TOPICAL DAILY
Qty: 20 PATCH | Refills: 0 | Status: SHIPPED | OUTPATIENT
Start: 2024-05-22 | End: 2024-06-01

## 2024-05-22 RX ORDER — TRAMADOL HYDROCHLORIDE 50 MG/1
50 TABLET ORAL 2 TIMES DAILY PRN
Qty: 60 TABLET | Refills: 0 | Status: SHIPPED | OUTPATIENT
Start: 2024-05-22

## 2024-05-22 RX ORDER — TIRZEPATIDE 2.5 MG/.5ML
2.5 INJECTION, SOLUTION SUBCUTANEOUS WEEKLY
Qty: 6 ML | Refills: 0 | Status: SHIPPED | OUTPATIENT
Start: 2024-05-22 | End: 2024-06-19

## 2024-05-23 ENCOUNTER — TELEPHONE (OUTPATIENT)
Age: 59
End: 2024-05-23

## 2024-05-23 NOTE — TELEPHONE ENCOUNTER
Pt called to say the ICM just left and told her that she needs an assessment to get weekly home care help approved. The agency info is [facility] phone [phone] fax [phone]  Chanel Waters cell [phone].

## 2024-05-26 DIAGNOSIS — R21 RASH: ICD-10-CM

## 2024-05-29 RX ORDER — BETAMETHASONE DIPROPIONATE 0.5 MG/G
1 CREAM TOPICAL 2 TIMES DAILY
Qty: 30 G | Refills: 0 | Status: SHIPPED | OUTPATIENT
Start: 2024-05-29

## 2024-06-06 ENCOUNTER — TELEPHONE (OUTPATIENT)
Dept: OBGYN CLINIC | Facility: MEDICAL CENTER | Age: 59
End: 2024-06-06

## 2024-06-06 NOTE — TELEPHONE ENCOUNTER
Caller: Janette     Doctor: Shawn     Reason for call:  Pt calling in pain requesting:  ultrasound guided left knee   last done 8/17/23.     Thank you        Call back#: [phone]

## 2024-06-07 ENCOUNTER — TELEPHONE (OUTPATIENT)
Age: 59
End: 2024-06-07

## 2024-06-07 NOTE — TELEPHONE ENCOUNTER
FYI: Pt wanted to know if she can  take more than 3   traMADol (Ultram) 50 mg tablet a day due to pain when standing. Pt was told she needs an appt.  pt declined an appt.

## 2024-06-18 ENCOUNTER — TELEPHONE (OUTPATIENT)
Age: 59
End: 2024-06-18

## 2024-06-18 NOTE — TELEPHONE ENCOUNTER
Caller: Janette    Doctor: beth    Reason for call: patient would like to schedule a [facility]     Call back#: [phone]

## 2024-06-20 NOTE — TELEPHONE ENCOUNTER
Caller: Janette      Doctor: Shawn      Reason for call:  Pt calling in pain requesting:  ultrasound guided left knee injection,   last done 8/17/23.      Thank you         Call back#: [phone]

## 2024-07-02 ENCOUNTER — TELEPHONE (OUTPATIENT)
Dept: FAMILY MEDICINE CLINIC | Facility: CLINIC | Age: 59
End: 2024-07-02

## 2024-07-02 NOTE — TELEPHONE ENCOUNTER
Patient called in to get a refill on her Gabapentin 100 mg. She states she is taking 2 pills in the morning and 2 pills in the evening. The directions listed do not seem to follow with what it is she is taking.     Please review and send in a new prescription with the correct directions. Reach out to the patient with any questions or concerns.

## 2024-07-09 DIAGNOSIS — G62.9 NEUROPATHY: ICD-10-CM

## 2024-07-09 RX ORDER — GABAPENTIN 100 MG/1
100 CAPSULE ORAL 3 TIMES DAILY PRN
Qty: 90 CAPSULE | Refills: 1 | Status: SHIPPED | OUTPATIENT
Start: 2024-07-09

## 2024-07-09 NOTE — TELEPHONE ENCOUNTER
Spoke with pt and she stated that she did increase her gabapentin, however it was to 1 tablet tid. She would like to know if you would increase to this dosage. Pt has an upcoming appointment 08/2024. She stated if this is not possible to please send in her present dosage refill and will discuss with you at appointment.

## 2024-08-01 ENCOUNTER — OFFICE VISIT (OUTPATIENT)
Dept: OBGYN CLINIC | Facility: MEDICAL CENTER | Age: 59
End: 2024-08-01
Payer: COMMERCIAL

## 2024-08-01 ENCOUNTER — APPOINTMENT (OUTPATIENT)
Dept: LAB | Facility: MEDICAL CENTER | Age: 59
End: 2024-08-01
Payer: COMMERCIAL

## 2024-08-01 DIAGNOSIS — E66.01 MORBID OBESITY WITH BMI OF 60.0-69.9, ADULT (HCC): ICD-10-CM

## 2024-08-01 DIAGNOSIS — E03.8 OTHER SPECIFIED HYPOTHYROIDISM: ICD-10-CM

## 2024-08-01 DIAGNOSIS — M17.12 PRIMARY OSTEOARTHRITIS OF LEFT KNEE: Primary | ICD-10-CM

## 2024-08-01 LAB
ALBUMIN SERPL BCG-MCNC: 4.3 G/DL (ref 3.5–5)
ALP SERPL-CCNC: 86 U/L (ref 34–104)
ALT SERPL W P-5'-P-CCNC: 23 U/L (ref 7–52)
ANION GAP SERPL CALCULATED.3IONS-SCNC: 13 MMOL/L (ref 4–13)
AST SERPL W P-5'-P-CCNC: 21 U/L (ref 13–39)
BASOPHILS # BLD AUTO: 0.07 THOUSANDS/ÂΜL (ref 0–0.1)
BASOPHILS NFR BLD AUTO: 1 % (ref 0–1)
BILIRUB SERPL-MCNC: 0.54 MG/DL (ref 0.2–1)
BUN SERPL-MCNC: 15 MG/DL (ref 5–25)
CALCIUM SERPL-MCNC: 9.8 MG/DL (ref 8.4–10.2)
CHLORIDE SERPL-SCNC: 103 MMOL/L (ref 96–108)
CHOLEST SERPL-MCNC: 140 MG/DL
CO2 SERPL-SCNC: 25 MMOL/L (ref 21–32)
CREAT SERPL-MCNC: 1.06 MG/DL (ref 0.6–1.3)
EOSINOPHIL # BLD AUTO: 0.17 THOUSAND/ÂΜL (ref 0–0.61)
EOSINOPHIL NFR BLD AUTO: 2 % (ref 0–6)
ERYTHROCYTE [DISTWIDTH] IN BLOOD BY AUTOMATED COUNT: 13.2 % (ref 11.6–15.1)
EST. AVERAGE GLUCOSE BLD GHB EST-MCNC: 105 MG/DL
GFR SERPL CREATININE-BSD FRML MDRD: 57 ML/MIN/1.73SQ M
GLUCOSE P FAST SERPL-MCNC: 104 MG/DL (ref 65–99)
HBA1C MFR BLD: 5.3 %
HCT VFR BLD AUTO: 48.5 % (ref 34.8–46.1)
HDLC SERPL-MCNC: 50 MG/DL
HGB BLD-MCNC: 15.8 G/DL (ref 11.5–15.4)
IMM GRANULOCYTES # BLD AUTO: 0.05 THOUSAND/UL (ref 0–0.2)
IMM GRANULOCYTES NFR BLD AUTO: 1 % (ref 0–2)
LDLC SERPL CALC-MCNC: 70 MG/DL (ref 0–100)
LYMPHOCYTES # BLD AUTO: 2.18 THOUSANDS/ÂΜL (ref 0.6–4.47)
LYMPHOCYTES NFR BLD AUTO: 26 % (ref 14–44)
MCH RBC QN AUTO: 31.3 PG (ref 26.8–34.3)
MCHC RBC AUTO-ENTMCNC: 32.6 G/DL (ref 31.4–37.4)
MCV RBC AUTO: 96 FL (ref 82–98)
MONOCYTES # BLD AUTO: 0.72 THOUSAND/ÂΜL (ref 0.17–1.22)
MONOCYTES NFR BLD AUTO: 9 % (ref 4–12)
NEUTROPHILS # BLD AUTO: 5.12 THOUSANDS/ÂΜL (ref 1.85–7.62)
NEUTS SEG NFR BLD AUTO: 61 % (ref 43–75)
NONHDLC SERPL-MCNC: 90 MG/DL
NRBC BLD AUTO-RTO: 0 /100 WBCS
PLATELET # BLD AUTO: 297 THOUSANDS/UL (ref 149–390)
PMV BLD AUTO: 11.7 FL (ref 8.9–12.7)
POTASSIUM SERPL-SCNC: 3.8 MMOL/L (ref 3.5–5.3)
PROT SERPL-MCNC: 7.8 G/DL (ref 6.4–8.4)
RBC # BLD AUTO: 5.05 MILLION/UL (ref 3.81–5.12)
SODIUM SERPL-SCNC: 141 MMOL/L (ref 135–147)
T4 FREE SERPL-MCNC: 0.97 NG/DL (ref 0.61–1.12)
TRIGL SERPL-MCNC: 100 MG/DL
WBC # BLD AUTO: 8.31 THOUSAND/UL (ref 4.31–10.16)

## 2024-08-01 PROCEDURE — 85025 COMPLETE CBC W/AUTO DIFF WBC: CPT

## 2024-08-01 PROCEDURE — 83036 HEMOGLOBIN GLYCOSYLATED A1C: CPT

## 2024-08-01 PROCEDURE — 80053 COMPREHEN METABOLIC PANEL: CPT

## 2024-08-01 PROCEDURE — 84439 ASSAY OF FREE THYROXINE: CPT

## 2024-08-01 PROCEDURE — 36415 COLL VENOUS BLD VENIPUNCTURE: CPT

## 2024-08-01 PROCEDURE — 80061 LIPID PANEL: CPT

## 2024-08-01 PROCEDURE — 99213 OFFICE O/P EST LOW 20 MIN: CPT | Performed by: PHYSICAL MEDICINE & REHABILITATION

## 2024-08-01 RX ORDER — TIRZEPATIDE 5 MG/.5ML
INJECTION, SOLUTION SUBCUTANEOUS
COMMUNITY
Start: 2024-07-02

## 2024-08-01 NOTE — PROGRESS NOTES
1. Primary osteoarthritis of left knee  Large joint arthrocentesis      2. Morbid obesity with BMI of 60.0-69.9, adult (Formerly [facility])  Large joint arthrocentesis        Orders Placed This Encounter   Procedures    Large joint arthrocentesis        Impression:  Patient is here in follow up of left knee osteoarthritis.  The patient is a good candidate for repeat ultrasound guided left knee IA injection (last 2023) due to suboptimal body habitus.  That she has been doing well in regards to her pain.  We decided to hold off on an injection today.  I will see her back if her pain returns for an ultrasound-guided knee intra-articular injection.  In the interim, encouraged her to walk as tolerated.     Imaging Studies (I personally reviewed images in PACS and report):  Left knee x-rays most recent to this encounter reviewed.  These images show severe tricompartmental osteoarthritis.    No follow-ups on file.    Patient is in agreement with the above plan.    HPI:  Janette Gold is a 58 y.o. female  who presents in follow up.  Here for   Chief Complaint   Patient presents with    Left Knee - Pain       Since last visit: See above.    Following history reviewed and updated:  Past Medical History:   Diagnosis Date    Anxiety     Asthma     Chronic fatigue     COPD (chronic obstructive pulmonary disease) (Formerly [facility])     Depression     Fibromyalgia     Head injury     Hypothyroid     Morbid obesity with BMI of 60.0-69.9, adult (Formerly [facility])     Sleep apnea     PT states a very mild case, prescribed CPAP but does not use machine.     Past Surgical History:   Procedure Laterality Date    APPENDECTOMY       SECTION      ESOPHAGOGASTRODUODENOSCOPY      HERNIA REPAIR      Primary repair of incarcerated umbilical hernia - Dr. Bloch     HYSTERECTOMY      Hysterectomy/revise vagina    KNEE SURGERY      Arthrotomy with open meniscus repair     LAPAROSCOPIC CHOLECYSTECTOMY      TONSILECTOMY AND ADNOIDECTOMY      TUMOR REMOVAL      head    URETER  SURGERY      repair    WISDOM TOOTH EXTRACTION       Social History   Social History     Substance and Sexual Activity   Alcohol Use Not Currently    Comment: rarely     Social History     Substance and Sexual Activity   Drug Use Not Currently     Social History     Tobacco Use   Smoking Status Former    Types: Cigarettes   Smokeless Tobacco Never   Tobacco Comments    3= PPD - As per Gauri      Family History   Problem Relation Age of Onset    Depression Mother     Anxiety disorder Mother     No Known Problems Father     No Known Problems Sister     No Known Problems Maternal Grandmother     No Known Problems Maternal Grandfather     Breast cancer Paternal Grandmother     Esophageal cancer Paternal Grandfather     Skin cancer Paternal Grandfather     Diabetes Brother     Hypertension Brother     No Known Problems Son     Diabetes Family     Heart disease Family     Stroke Neg Hx     Thyroid disease Neg Hx      Allergies   Allergen Reactions    Ciprofloxacin Other (See Comments)     Reaction Date: 03May2011;       Penicillins Other (See Comments)    Advil [Ibuprofen] Hives and Rash        Constitutional:  There were no vitals taken for this visit.   General: NAD.  Eyes: Clear sclerae.  ENT: No inflammation, lesion, or mass of lips.  No tracheal deviation.  Musculoskeletal: As mentioned below.  Integumentary: No visible rashes or skin lesions.  Pulmonary/Chest: Effort normal. No respiratory distress.   Neuro: CN's grossly intact, GONZALEZ.  Psych: Normal affect and judgement.  Vascular: WWP.    Left Knee Exam     Range of Motion   Extension:  normal     Other   Erythema: absent  Scars: absent  Sensation: normal  Pulse: present  Swelling: none  Effusion: no effusion present             Procedures

## 2024-08-20 ENCOUNTER — OFFICE VISIT (OUTPATIENT)
Dept: FAMILY MEDICINE CLINIC | Facility: CLINIC | Age: 59
End: 2024-08-20
Payer: COMMERCIAL

## 2024-08-20 VITALS
DIASTOLIC BLOOD PRESSURE: 86 MMHG | WEIGHT: 293 LBS | HEART RATE: 102 BPM | OXYGEN SATURATION: 98 % | HEIGHT: 63 IN | SYSTOLIC BLOOD PRESSURE: 104 MMHG | RESPIRATION RATE: 20 BRPM | BODY MASS INDEX: 51.91 KG/M2 | TEMPERATURE: 97.4 F

## 2024-08-20 DIAGNOSIS — F32.0 CURRENT MILD EPISODE OF MAJOR DEPRESSIVE DISORDER, UNSPECIFIED WHETHER RECURRENT (HCC): Primary | ICD-10-CM

## 2024-08-20 DIAGNOSIS — K21.9 GASTROESOPHAGEAL REFLUX DISEASE WITHOUT ESOPHAGITIS: ICD-10-CM

## 2024-08-20 DIAGNOSIS — E01.0 THYROMEGALY: ICD-10-CM

## 2024-08-20 DIAGNOSIS — E03.8 OTHER SPECIFIED HYPOTHYROIDISM: ICD-10-CM

## 2024-08-20 PROCEDURE — 99214 OFFICE O/P EST MOD 30 MIN: CPT | Performed by: NURSE PRACTITIONER

## 2024-08-20 RX ORDER — BUPROPION HYDROCHLORIDE 300 MG/1
300 TABLET ORAL EVERY MORNING
Qty: 90 TABLET | Refills: 3 | Status: SHIPPED | OUTPATIENT
Start: 2024-08-20 | End: 2025-08-15

## 2024-08-20 RX ORDER — TIRZEPATIDE 5 MG/.5ML
5 INJECTION, SOLUTION SUBCUTANEOUS WEEKLY
Qty: 6 ML | Refills: 1 | Status: SHIPPED | OUTPATIENT
Start: 2024-08-20

## 2024-08-20 RX ORDER — LEVOTHYROXINE SODIUM 50 UG/1
50 TABLET ORAL
Qty: 90 TABLET | Refills: 1 | Status: SHIPPED | OUTPATIENT
Start: 2024-08-20

## 2024-08-20 RX ORDER — DULOXETIN HYDROCHLORIDE 60 MG/1
60 CAPSULE, DELAYED RELEASE ORAL DAILY
Qty: 90 CAPSULE | Refills: 1 | Status: SHIPPED | OUTPATIENT
Start: 2024-08-20

## 2024-08-20 NOTE — PROGRESS NOTES
Assessment/Plan:      Diagnoses and all orders for this visit:    Current mild episode of major depressive disorder, unspecified whether recurrent (HCC)  -     DULoxetine (CYMBALTA) 60 mg delayed release capsule; Take 1 capsule (60 mg total) by mouth daily    BMI 60.0-69.9, adult (HCC)  -     buPROPion (WELLBUTRIN XL) 300 mg 24 hr tablet; Take 1 tablet (300 mg total) by mouth every morning  -     CBC and differential; Future  -     Comprehensive metabolic panel; Future  -     Lipid panel; Future  -     Zepbound 5 MG/0.5ML auto-injector; Inject 0.5 mL (5 mg total) under the skin once a week    Thyromegaly  -     levothyroxine 50 mcg tablet; Take 1 tablet (50 mcg total) by mouth daily in the early morning  -     TSH, 3rd generation; Future    Other specified hypothyroidism  -     levothyroxine 50 mcg tablet; Take 1 tablet (50 mcg total) by mouth daily in the early morning  -     CBC and differential; Future  -     Comprehensive metabolic panel; Future  -     Lipid panel; Future  -     TSH, 3rd generation; Future  -     UA w Reflex to Microscopic w Reflex to Culture; Future    Gastroesophageal reflux disease without esophagitis  -     omeprazole (PriLOSEC) 20 mg delayed release capsule; Take 1 capsule (20 mg total) by mouth daily    Other orders  -     Shower Chair with Back [$]        Physical assessment unremarkable except where noted. Labs reviewed were WNL also VS were well controlled.  Patient continues with Zepbound doing quite well has lost an additional amount of weight is currently at a BMI of 64 we will continue with this medication with no increase in dosages.  Will refill patient's Wellbutrin and Cymbalta at current doses issues with no changes.  Prilosec as well which is taken and well-tolerated additionally levothyroxine thyroids were within normal limits and no current changes in dosage.  Would like to see patient back in the office in 3 months for follow-up.  Labs given is to complete in 6 mos for  possible fu discussion. RTO as needed or for next scheduled appt. All questions answered.    Subjective:     Patient ID: Janette Gold is a 58 y.o. female.    HPI    Review of Systems      Objective:     Physical Exam

## 2024-08-21 ENCOUNTER — TELEPHONE (OUTPATIENT)
Age: 59
End: 2024-08-21

## 2024-08-21 NOTE — TELEPHONE ENCOUNTER
Patient called to advise that there is a waiver form being fax to the office in regards for her to get home services due to her condition. [employer] is the company handling this. They also mention to her there is a website that provider can go on to fill out the form, [email or web address].     Please call patient back if we receive the form.

## 2024-08-28 NOTE — ASSESSMENT & PLAN NOTE
Chronic cond  Currently on diet therapy  Lab results dw pt   Taking albuterol and combivent  -continue management with prescribing provider

## 2024-08-29 NOTE — TELEPHONE ENCOUNTER
Patient calling to verify if form was received.  I do not see anything in media still.  Is there an update on this form?    I also tried calling the agent Christopher that patient has been working with to see if I could help patient in trying to get the form refaxed. Patient was also on the line with me. Office/agent voicemail was received.    I verified office fax number with patient and she will call [employer] [facility] to resend form again.      Please call if you receive this form, if not patient is also going to call to have them resend it.

## 2024-09-19 ENCOUNTER — TELEPHONE (OUTPATIENT)
Age: 59
End: 2024-09-19

## 2024-09-19 NOTE — TELEPHONE ENCOUNTER
Patient returned call from Jumana this a.m.  Was able to tell patient the form was received, filled out and faxed back.  Patient understood. NFA.  Thank you.

## 2024-09-30 ENCOUNTER — NURSE TRIAGE (OUTPATIENT)
Dept: OTHER | Facility: OTHER | Age: 59
End: 2024-09-30
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
## 2024-10-09 ENCOUNTER — TELEPHONE (OUTPATIENT)
Age: 59
End: 2024-10-09

## 2024-10-09 NOTE — TELEPHONE ENCOUNTER
Pt called requesting that the pharmacy called her and they need a prior auth for zepbound. Pt has been on 5 for awhile and her weight loss has been steady, so asking since we need to submit a prior auth if we can up her dosage.   Pt also stated she asked for a shower chair a few months ago and was never received, are we able to resubmit that order as well to [employer]. Please call pt to advise.

## 2024-10-10 ENCOUNTER — OFFICE VISIT (OUTPATIENT)
Dept: OBGYN CLINIC | Facility: MEDICAL CENTER | Age: 59
End: 2024-10-10
Payer: COMMERCIAL

## 2024-10-10 VITALS — BODY MASS INDEX: 51.91 KG/M2 | WEIGHT: 293 LBS | HEIGHT: 63 IN

## 2024-10-10 DIAGNOSIS — M17.12 PRIMARY OSTEOARTHRITIS OF LEFT KNEE: Primary | ICD-10-CM

## 2024-10-10 DIAGNOSIS — E66.01 MORBID OBESITY WITH BMI OF 60.0-69.9, ADULT (HCC): Primary | ICD-10-CM

## 2024-10-10 PROCEDURE — 20611 DRAIN/INJ JOINT/BURSA W/US: CPT | Performed by: PHYSICAL MEDICINE & REHABILITATION

## 2024-10-10 PROCEDURE — 99213 OFFICE O/P EST LOW 20 MIN: CPT | Performed by: PHYSICAL MEDICINE & REHABILITATION

## 2024-10-10 RX ORDER — TIRZEPATIDE 7.5 MG/.5ML
7.5 INJECTION, SOLUTION SUBCUTANEOUS WEEKLY
Qty: 6 ML | Refills: 1 | Status: SHIPPED | OUTPATIENT
Start: 2024-10-10

## 2024-10-10 RX ORDER — TRIAMCINOLONE ACETONIDE 40 MG/ML
80 INJECTION, SUSPENSION INTRA-ARTICULAR; INTRAMUSCULAR
Status: COMPLETED | OUTPATIENT
Start: 2024-10-10 | End: 2024-10-10

## 2024-10-10 RX ORDER — ROPIVACAINE HYDROCHLORIDE 5 MG/ML
10 INJECTION, SOLUTION EPIDURAL; INFILTRATION; PERINEURAL
Status: COMPLETED | OUTPATIENT
Start: 2024-10-10 | End: 2024-10-10

## 2024-10-10 RX ADMIN — ROPIVACAINE HYDROCHLORIDE 10 ML: 5 INJECTION, SOLUTION EPIDURAL; INFILTRATION; PERINEURAL at 11:15

## 2024-10-10 RX ADMIN — TRIAMCINOLONE ACETONIDE 80 MG: 40 INJECTION, SUSPENSION INTRA-ARTICULAR; INTRAMUSCULAR at 11:15

## 2024-10-10 NOTE — TELEPHONE ENCOUNTER
Done for the zepbound but the chair has been sent and she should call [facility] and find out her cost as it may be high.

## 2024-10-10 NOTE — TELEPHONE ENCOUNTER
Phone call to pt and notified rx was called in.  Pt will follow up with insurance in regards to shower chair.

## 2024-10-10 NOTE — PROGRESS NOTES
1. Primary osteoarthritis of left knee  Large joint arthrocentesis: L knee        Orders Placed This Encounter   Procedures    Large joint arthrocentesis: L knee        Impression:  Patient is here in follow up of left knee osteoarthritis.  The patient is a good candidate for repeat ultrasound guided left knee IA injection (last 2023) due to suboptimal body habitus.  Continue weight loss efforts.     Imaging Studies (I personally reviewed images in PACS and report):  Left knee x-rays most recent to this encounter reviewed.  These images show severe tricompartmental osteoarthritis.    No follow-ups on file.    Patient is in agreement with the above plan.    HPI:  Janette Gold is a 58 y.o. female  who presents in follow up.  Here for   Chief Complaint   Patient presents with    Left Knee - Pain, Injections       Since last visit: See above.    Following history reviewed and updated:  Past Medical History:   Diagnosis Date    Anxiety     Asthma     Chronic fatigue     COPD (chronic obstructive pulmonary disease) (Formerly [facility])     Depression     Fibromyalgia     Head injury     Hypothyroid     Morbid obesity with BMI of 60.0-69.9, adult (Former[facility])     Sleep apnea     PT states a very mild case, prescribed CPAP but does not use machine.     Past Surgical History:   Procedure Laterality Date    APPENDECTOMY       SECTION      ESOPHAGOGASTRODUODENOSCOPY      HERNIA REPAIR      Primary repair of incarcerated umbilical hernia - Dr. Bloch     HYSTERECTOMY      Hysterectomy/revise vagina    KNEE SURGERY      Arthrotomy with open meniscus repair     LAPAROSCOPIC CHOLECYSTECTOMY      TONSILECTOMY AND ADNOIDECTOMY      TUMOR REMOVAL      head    URETER SURGERY      repair    WISDOM TOOTH EXTRACTION       Social History   Social History     Substance and Sexual Activity   Alcohol Use Not Currently    Comment: rarely     Social History     Substance and Sexual Activity   Drug Use Not Currently     Social History     Tobacco Use  "  Smoking Status Former    Types: Cigarettes   Smokeless Tobacco Never   Tobacco Comments    3= PPD - As per [place]      Family History   Problem Relation Age of Onset    Depression Mother     Anxiety disorder Mother     No Known Problems Father     No Known Problems Sister     No Known Problems Maternal Grandmother     No Known Problems Maternal Grandfather     Breast cancer Paternal Grandmother     Esophageal cancer Paternal Grandfather     Skin cancer Paternal Grandfather     Diabetes Brother     Hypertension Brother     No Known Problems Son     Diabetes Family     Heart disease Family     Stroke Neg Hx     Thyroid disease Neg Hx      Allergies   Allergen Reactions    Ciprofloxacin Other (See Comments)     Reaction Date: 03May2011;       Penicillins Other (See Comments)    Advil [Ibuprofen] Hives and Rash        Constitutional:  Ht 5' 3\" (1.6 m)   Wt (!) 166 kg (366 lb)   BMI 64.83 kg/m²    General: NAD.  Eyes: Clear sclerae.  ENT: No inflammation, lesion, or mass of lips.  No tracheal deviation.  Musculoskeletal: As mentioned below.  Integumentary: No visible rashes or skin lesions.  Pulmonary/Chest: Effort normal. No respiratory distress.   Neuro: CN's grossly intact, GONZALEZ.  Psych: Normal affect and judgement.  Vascular: WWP.    Left Knee Exam     Range of Motion   Flexion:  abnormal     Other   Erythema: absent  Scars: absent  Sensation: normal  Pulse: present             Large joint arthrocentesis: L knee  Universal Protocol:  procedure performed by consultantConsent: Verbal consent obtained. Written consent not obtained.  Risks and benefits: risks, benefits and alternatives were discussed  Consent given by: patient  Timeout called at: 10/10/2024 11:18 AM.  Patient understanding: patient states understanding of the procedure being performed  Patient consent: the patient's understanding of the procedure matches consent given  Site marked: the operative site was marked  Radiology Images displayed and " confirmed. If images not available, report reviewed: imaging studies available  Patient identity confirmed: verbally with patient  Supporting Documentation  Indications: pain   Procedure Details  Location: knee - L knee  Needle size: 22 G  Ultrasound guidance: yes  Approach: Inferolateral to patella.  Medications administered: 80 mg triamcinolone acetonide 40 mg/mL; 10 mL ropivacaine 0.5 %    Patient tolerance: patient tolerated the procedure well with no immediate complications  Dressing:  Sterile dressing applied    There was little to no resistance encountered during the injection.    Risks of this procedure include:    - Risk of bleeding since a needle is involved.  - Risk of infection (1/10,000 chance as per recent studies).  Signs/symptoms were discussed and they would prompt an urgent evaluation at an emergency department.  - Risk of pigmentation or skin dimpling in the skin (2-3% chance as per recent studies) from the steroid.  - Risk of increased pain from steroid flare (1% chance as per recent studies) that typically lasts 24-48 hours.  - Risk of increased blood sugars from the steroid medication that can last for a few weeks.  If the patient is a diabetic or pre-diabetic, they were encouraged to closely monitor their blood sugars and discuss with PCP if elevated more than usual or if having symptoms.    The benefits outweigh the risks and so the procedure was completed.

## 2024-10-21 ENCOUNTER — TELEPHONE (OUTPATIENT)
Age: 59
End: 2024-10-21

## 2024-10-21 NOTE — TELEPHONE ENCOUNTER
Spoke with pharmacy and they stated that they need a PA for the increased dosage of 7.5mg.    Please submit prior auth.  Thanks.

## 2024-10-21 NOTE — TELEPHONE ENCOUNTER
"Pt called because [facility] told her there was not enough information sent for them to fill her Zepbound Rx.  It has been 3 weeks since she had her last shot.  She is very frustrated because she said her friends can all get their Rxs without any problems, \"and they aren't even fat!\"   Please call pt to advise.  She will also call [facility] about her shower chair.  "

## 2024-10-21 NOTE — TELEPHONE ENCOUNTER
PA Zepbound 7.5 mg/0.5 mL SUBMITTED     via    [x]CMM-KEY: [id number]  []Surescripts-Case ID #    []Availity-Auth ID #  NDC #    []Faxed to plan   []Other website    []Phone call Case ID #      Office notes sent, clinical questions answered. Awaiting determination    Turnaround time for your insurance to make a decision on your Prior Authorization can take 7-21 business days.

## 2024-10-24 NOTE — TELEPHONE ENCOUNTER
Phone call to pt, she will call insurance and obtain updated insurance information. Will call back when completed.

## 2024-10-24 NOTE — TELEPHONE ENCOUNTER
Please verify patient's insurance, cover my meds is unable to determine eligibility using the [employer] ID on file.

## 2024-11-01 DIAGNOSIS — M17.12 PRIMARY OSTEOARTHRITIS OF LEFT KNEE: ICD-10-CM

## 2024-11-01 RX ORDER — TRAMADOL HYDROCHLORIDE 50 MG/1
50 TABLET ORAL 2 TIMES DAILY PRN
Qty: 60 TABLET | Refills: 0 | Status: SHIPPED | OUTPATIENT
Start: 2024-11-01

## 2024-11-01 NOTE — TELEPHONE ENCOUNTER
Patient called for a med refill and also asked about the status of the zepbound. States she spoke with someone at the office and gave updated ins information. States she has not had the medication for 5 week. Please call the patient to see if you can help her get her medication.

## 2024-11-01 NOTE — TELEPHONE ENCOUNTER
Reason for call:   [x] Refill   [] Prior Auth  [] Other:     Office:   [x] PCP/Provider - Leonor Cortez  [] Specialty/Provider -     Medication:   Tramadol 50 mg, 1 bid prn, 60      Ph[facility]:   [facility]    Does the patient have enough for 3 days?   [x] Yes   [] No - Send as HP to POD

## 2024-11-01 NOTE — TELEPHONE ENCOUNTER
PA Zepbound 7.5mg/0.5mL DENIED    Reason:(Screenshot if applicable)        Message sent to office clinical pool Yes    Denial letter scanned into Media Yes    Appeal started No (Provider will need to decide if appeal is warranted and send clinical documentation to Prior Authorization Team for initiation.)    **Please follow up with your patient regarding denial and next steps**

## 2024-11-01 NOTE — TELEPHONE ENCOUNTER
PA Zepbound 7.5MG/0.5ML SUBMITTED     via    [x]CMM-KEY: [id number]  []Surescripts-Case ID #    []Availity-Auth ID #  NDC #    []Faxed to plan   []Other website    []Phone call Case ID #      Office notes sent, clinical questions answered. Awaiting determination    Turnaround time for your insurance to make a decision on your Prior Authorization can take 7-21 business days.

## 2024-11-01 NOTE — TELEPHONE ENCOUNTER
Please have Leonor addend recent office notes to reflect that she pt has been advised of healthy dieting and exercise, PA will then be resubmitted.

## 2024-11-04 DIAGNOSIS — E66.01 MORBID OBESITY WITH BMI OF 60.0-69.9, ADULT (HCC): ICD-10-CM

## 2024-11-04 RX ORDER — TIRZEPATIDE 7.5 MG/.5ML
7.5 INJECTION, SOLUTION SUBCUTANEOUS WEEKLY
Qty: 6 ML | Refills: 1 | Status: SHIPPED | OUTPATIENT
Start: 2024-11-04

## 2024-11-20 ENCOUNTER — TELEMEDICINE (OUTPATIENT)
Dept: FAMILY MEDICINE CLINIC | Facility: CLINIC | Age: 59
End: 2024-11-20
Payer: COMMERCIAL

## 2024-11-20 DIAGNOSIS — N39.3 STRESS INCONTINENCE OF URINE: ICD-10-CM

## 2024-11-20 DIAGNOSIS — M15.0 PRIMARY OSTEOARTHRITIS INVOLVING MULTIPLE JOINTS: ICD-10-CM

## 2024-11-20 DIAGNOSIS — R26.2 AMBULATORY DYSFUNCTION: ICD-10-CM

## 2024-11-20 DIAGNOSIS — E66.01 MORBID OBESITY WITH BMI OF 60.0-69.9, ADULT (HCC): Primary | ICD-10-CM

## 2024-11-20 DIAGNOSIS — F50.89 OTHER DISORDER OF EATING: ICD-10-CM

## 2024-11-20 PROCEDURE — 99213 OFFICE O/P EST LOW 20 MIN: CPT | Performed by: NURSE PRACTITIONER

## 2024-11-20 RX ORDER — TIRZEPATIDE 5 MG/.5ML
5 INJECTION, SOLUTION SUBCUTANEOUS WEEKLY
Qty: 2 ML | Refills: 0 | Status: SHIPPED | OUTPATIENT
Start: 2024-11-20

## 2024-11-20 NOTE — ASSESSMENT & PLAN NOTE
Orders:    tirzepatide (Zepbound) 5 mg/0.5 mL auto-injector; Inject 0.5 mL (5 mg total) under the skin once a week

## 2024-11-20 NOTE — PROGRESS NOTES
Virtual Regular Visit  Name: Janette Gold      : 1965      MRN: [id number]  Encounter Provider: APPLE Preciado  Encounter Date: 2024   Encounter department: [facility]      Verification of patient location:  Patient is located at Home in the following state in which I hold an active license [place] :  Assessment & Plan  Morbid obesity with BMI of 60.0-69.9, adult ([facility])      Orders:    tirzepatide (Zepbound) 5 mg/0.5 mL auto-injector; Inject 0.5 mL (5 mg total) under the skin once a week    Primary osteoarthritis involving multiple joints    Orders:    tirzepatide (Zepbound) 5 mg/0.5 mL auto-injector; Inject 0.5 mL (5 mg total) under the skin once a week    Stress incontinence of urine         Other disorder of eating         Ambulatory dysfunction             Encounter provider APPLE Preciado    The patient was identified by name and date of birth. Janette Gold was informed that this is a telemedicine visit and that the visit is being conducted through the Epic Embedded platform. She agrees to proceed..  My office door was closed. No one else was in the room.  She acknowledged consent and understanding of privacy and security of the video platform. The patient has agreed to participate and understands they can discontinue the visit at any time.    Patient is aware this is a billable service.     History of Present Illness       Patient is here for routine follow-up.  To review most recent labs.  To also discuss current state of health and any new problems that they may be experiencing.  Patient states that medications taken as prescribed and very well tolerated no new complaints at this time.          Review of Systems   Constitutional:  Negative for appetite change and fever.   HENT:  Negative for sinus pressure and sore throat.    Eyes:  Negative for pain.   Respiratory:  Negative for shortness of breath.    Cardiovascular:  Negative for chest pain.    Gastrointestinal:  Negative for abdominal pain.   Genitourinary:  Negative for dysuria.   Musculoskeletal:  Negative for arthralgias and myalgias.   Skin:  Negative for color change.   Neurological:  Negative for light-headedness.   Psychiatric/Behavioral:  Negative for behavioral problems.        Objective   There were no vitals taken for this visit.    Physical Exam  Constitutional:       Appearance: Normal appearance. She is obese.   Neurological:      General: No focal deficit present.      Mental Status: She is alert and oriented to person, place, and time.         Visit Time  Total Visit Duration: 12 minutes

## 2024-11-21 ENCOUNTER — TELEPHONE (OUTPATIENT)
Age: 59
End: 2024-11-21

## 2024-11-21 NOTE — TELEPHONE ENCOUNTER
Patient called states pharmacy called her to inform she is unable to get shower chair and incontinence pads through them. Would need to get through [facility]. Please place order

## 2024-11-22 ENCOUNTER — TELEPHONE (OUTPATIENT)
Age: 59
End: 2024-11-22

## 2024-11-22 NOTE — TELEPHONE ENCOUNTER
PA for ZEPBOUND 5MG SUBMITTED to [employer]    via      [x][employer]-Case ID #     [x]PA sent as URGENT    All office notes, labs and other pertaining documents and studies sent. Clinical questions answered. Awaiting determination from insurance company.     Turnaround time for your insurance to make a decision on your Prior Authorization can take 7-21 business days.

## 2024-11-25 NOTE — TELEPHONE ENCOUNTER
PA for zepbound 5mg- DENIED    Reason:(Screenshot if applicable)        Message sent to office clinical pool Yes    Denial letter scanned into Media No waiting for fax    Appeal started No (Provider will need to decide if appeal is warranted and send clinical documentation to Prior Authorization Team for initiation.)    **Please follow up with your patient regarding denial and next steps**

## 2024-11-29 NOTE — TELEPHONE ENCOUNTER
Patient called, she received a letter zepbound was denied.  Patient does not understand how it was denied, she was on it and when rx ran out it required another prior auth.  She is going to reach out to insurance however she wants to know what needs to be done to get medication approved.

## 2024-12-03 ENCOUNTER — TELEPHONE (OUTPATIENT)
Age: 59
End: 2024-12-03

## 2024-12-03 NOTE — TELEPHONE ENCOUNTER
Pt. Received a call from her insurance company and she needs Leonor Cortez to put in writing to the insurance company that she has been counseled in diet and exercise. The Insurance Co. Said ASAP would be best.

## 2024-12-24 DIAGNOSIS — M17.12 PRIMARY OSTEOARTHRITIS OF LEFT KNEE: ICD-10-CM

## 2024-12-24 NOTE — TELEPHONE ENCOUNTER
Reason for call:   [x] Refill   [] Prior Auth  [] Other:     Office: [facility]  [x] PCP/Provider - Leonor Cortez   [] Specialty/Provider -     Medication: tramadol     Dose/Frequency: 50 mg/ twice daily PRN     Quantity: 30 day supply     Pharmacy: [facility] in [place]     Does the patient have enough for 3 days?   [] Yes   [x] No - Send as HP to POD

## 2024-12-26 NOTE — TELEPHONE ENCOUNTER
Patient called rx refill line inquiring a status on Tramadol refill request. Patient is completely without during this time. Due to holiday events her pain is quite significant. Please address as soon as available.

## 2024-12-27 RX ORDER — TRAMADOL HYDROCHLORIDE 50 MG/1
50 TABLET ORAL 2 TIMES DAILY PRN
Qty: 60 TABLET | Refills: 0 | Status: SHIPPED | OUTPATIENT
Start: 2024-12-27

## 2025-02-23 DIAGNOSIS — E01.0 THYROMEGALY: ICD-10-CM

## 2025-02-23 DIAGNOSIS — E03.8 OTHER SPECIFIED HYPOTHYROIDISM: ICD-10-CM

## 2025-02-24 RX ORDER — LEVOTHYROXINE SODIUM 50 UG/1
50 TABLET ORAL
Qty: 90 TABLET | Refills: 1 | Status: SHIPPED | OUTPATIENT
Start: 2025-02-24

## 2025-04-29 ENCOUNTER — TELEPHONE (OUTPATIENT)
Age: 60
End: 2025-04-29

## 2025-04-29 NOTE — TELEPHONE ENCOUNTER
Caller: [facility] [employer]    Doctor: Dr. Escobar    Reason for call: Miley from [facility] called to schedule patient left knee for USGI CSI. Please assist scheduling.    Call back#: [phone] extension [phone]

## 2025-05-22 ENCOUNTER — OFFICE VISIT (OUTPATIENT)
Dept: OBGYN CLINIC | Facility: MEDICAL CENTER | Age: 60
End: 2025-05-22

## 2025-05-22 DIAGNOSIS — M17.12 PRIMARY OSTEOARTHRITIS OF LEFT KNEE: Primary | ICD-10-CM

## 2025-05-22 RX ORDER — TRIAMCINOLONE ACETONIDE 40 MG/ML
80 INJECTION, SUSPENSION INTRA-ARTICULAR; INTRAMUSCULAR
Status: COMPLETED | OUTPATIENT
Start: 2025-05-22 | End: 2025-05-22

## 2025-05-22 RX ORDER — ROPIVACAINE HYDROCHLORIDE 5 MG/ML
10 INJECTION, SOLUTION EPIDURAL; INFILTRATION; PERINEURAL
Status: COMPLETED | OUTPATIENT
Start: 2025-05-22 | End: 2025-05-22

## 2025-05-22 RX ADMIN — TRIAMCINOLONE ACETONIDE 80 MG: 40 INJECTION, SUSPENSION INTRA-ARTICULAR; INTRAMUSCULAR at 11:30

## 2025-05-22 RX ADMIN — ROPIVACAINE HYDROCHLORIDE 10 ML: 5 INJECTION, SOLUTION EPIDURAL; INFILTRATION; PERINEURAL at 11:30

## 2025-05-22 NOTE — PROGRESS NOTES
Assessment & Plan  Primary osteoarthritis of left knee  Patient is here in follow up of left knee osteoarthritis.  The patient is a good candidate for repeat ultrasound guided left knee IA injection (last 10/2024) due to suboptimal body habitus.  Continue weight loss efforts.  We will obtain viscosupplementation. TO BE DONE WITH ULTRASOUND GUIDANCE.    We will obtain authorization for viscosupplementation.      At this juncture, the patient has failed over 3 months of conservative treatment that has included intraarticular steroid injection, home exercises, activity modification, over the counter oral and topical pain medications.  The pain is interfering with their activities of daily living.  They report their pain as 10/10.  Orders:    Large joint arthrocentesis: L knee    Injection procedure prior authorization; Future    BMI 60.0-69.9, adult ([facility])         No follow-ups on file.    Patient is in agreement with the above plan.    HPI:  Janette Gold is a 59 y.o. female  who presents in follow up.  Here for   Chief Complaint   Patient presents with    Left Knee - Pain, Follow-up       Since last visit: See above.    Following history reviewed and updated:  Past Medical History[1]  Past Surgical History[2]  Social History   Social History     Substance and Sexual Activity   Alcohol Use Not Currently    Comment: rarely     Social History     Substance and Sexual Activity   Drug Use Not Currently     Tobacco Use History[3]  Family History[4]  Allergies[5]     Constitutional:  There were no vitals taken for this visit.   General: NAD.  Eyes: Clear sclerae.  ENT: No inflammation, lesion, or mass of lips.  No tracheal deviation.  Musculoskeletal: As mentioned below.  Integumentary: No visible rashes or skin lesions.  Pulmonary/Chest: Effort normal. No respiratory distress.   Neuro: CN's grossly intact, GONZALEZ.  Psych: Normal affect and judgement.  Vascular: WWP.    Left Knee Exam     Tenderness   The patient is  experiencing tenderness in the medial joint line.    Range of Motion   Extension:  normal     Other   Erythema: absent  Scars: absent  Sensation: normal  Pulse: present  Swelling: none  Effusion: no effusion present             Large joint arthrocentesis: L knee    Performed by: Jarocho Escobar DO  Authorized by: Jarocho Escobar DO    Universal Protocol:  Procedure performed by:  Consent: Verbal consent obtained. Written consent not obtained  Consent given by: patient  Timeout called at: 5/22/2025 10:44 AM.  Patient understanding: patient states understanding of the procedure being performed  Site marked: the operative site was marked  Radiology Images displayed and confirmed. If images not available, report reviewed: imaging studies available  Patient identity confirmed: verbally with patient  Supporting Documentation  Indications: pain and diagnostic evaluation     Is this a Visco injection? NoProcedure Details  Location: knee - L knee  Ultrasound guidance: yes (US guidance was used to find the area of interest.)  Medications administered: 80 mg triamcinolone acetonide 40 mg/mL; 10 mL ropivacaine 0.5 %    Patient tolerance: patient tolerated the procedure well with no immediate complications  Dressing:  Sterile dressing applied    The left knee IA space was visualized with ultrasound and injected with steroid/anesthetic solution as indicated.    Prior to the injection, the ultrasound was used to evaluate for any neural or vascular structures.  Care was taken to avoid these structures.    The images (and video if taken) were saved to the [employer] ultrasound system.    Risks of this procedure include:    - Risk of bleeding since a needle is involved.  - Risk of infection (1/10,000 chance as per recent studies).  Signs/symptoms were discussed and they would prompt an urgent evaluation at an emergency department.  - Risk of pigmentation or skin dimpling in the skin (2-3% chance as per recent studies) from the steroid.  - Risk  of increased pain from steroid flare (1% chance as per recent studies) that typically lasts 24-48 hours.  - Risk of increased blood sugars from the steroid medication that can last for a few weeks.  If the patient is a diabetic or pre-diabetic, they were encouraged to closely monitor their blood sugars and discuss with PCP if elevated more than usual or if having symptoms.    We decided that the benefits outweigh the risks and so we proceeded with the procedure.             [1]   Past Medical History:  Diagnosis Date    Anxiety     Asthma     Chronic fatigue     COPD (chronic obstructive pulmonary disease) ([facility])     Depression     Fibromyalgia     Head injury     Hypothyroid     Morbid obesity with BMI of 60.0-69.9, adult ([facility])     Sleep apnea     PT states a very mild case, prescribed CPAP but does not use machine.   [2]   Past Surgical History:  Procedure Laterality Date    APPENDECTOMY       SECTION      ESOPHAGOGASTRODUODENOSCOPY      HERNIA REPAIR      Primary repair of incarcerated umbilical hernia - Dr. Bloch     HYSTERECTOMY      Hysterectomy/revise vagina    KNEE SURGERY      Arthrotomy with open meniscus repair     LAPAROSCOPIC CHOLECYSTECTOMY      TONSILECTOMY AND ADNOIDECTOMY      TUMOR REMOVAL      head    URETER SURGERY      repair    WISDOM TOOTH EXTRACTION     [3]   Social History  Tobacco Use   Smoking Status Former    Types: Cigarettes   Smokeless Tobacco Never   Tobacco Comments    3= PPD - As per Gauri    [4]   Family History  Problem Relation Name Age of Onset    Depression Mother      Anxiety disorder Mother      No Known Problems Father      No Known Problems Sister      No Known Problems Maternal Grandmother      No Known Problems Maternal Grandfather      Breast cancer Paternal Grandmother      Esophageal cancer Paternal Grandfather      Skin cancer Paternal Grandfather      Diabetes Brother      Hypertension Brother      No Known Problems Son      Diabetes Family      Heart disease  Family      Stroke Neg Hx      Thyroid disease Neg Hx     [5]   Allergies  Allergen Reactions    Ciprofloxacin Other (See Comments)     Reaction Date: 03May2011;       Penicillins Other (See Comments)    Advil [Ibuprofen] Hives and Rash

## 2025-05-22 NOTE — ASSESSMENT & PLAN NOTE
Patient is here in follow up of left knee osteoarthritis.  The patient is a good candidate for repeat ultrasound guided left knee IA injection (last 10/2024) due to suboptimal body habitus.  Continue weight loss efforts.  We will obtain viscosupplementation. TO BE DONE WITH ULTRASOUND GUIDANCE.    We will obtain authorization for viscosupplementation.      At this juncture, the patient has failed over 3 months of conservative treatment that has included intraarticular steroid injection, home exercises, activity modification, over the counter oral and topical pain medications.  The pain is interfering with their activities of daily living.  They report their pain as 10/10.  Orders:    Large joint arthrocentesis: L knee    Injection procedure prior authorization; Future

## 2025-06-12 ENCOUNTER — PROCEDURE VISIT (OUTPATIENT)
Dept: OBGYN CLINIC | Facility: MEDICAL CENTER | Age: 60
End: 2025-06-12
Payer: MEDICARE

## 2025-06-12 VITALS — WEIGHT: 293 LBS | BODY MASS INDEX: 63.24 KG/M2

## 2025-06-12 DIAGNOSIS — M17.12 PRIMARY OSTEOARTHRITIS OF LEFT KNEE: Primary | ICD-10-CM

## 2025-06-12 PROCEDURE — 20611 DRAIN/INJ JOINT/BURSA W/US: CPT | Performed by: PHYSICAL MEDICINE & REHABILITATION

## 2025-06-12 NOTE — PROGRESS NOTES
Assessment & Plan  Primary osteoarthritis of left knee  Patient is here in follow up of left knee osteoarthritis.  The patient is a good candidate for repeat ultrasound guided left knee IA injection (last 10/2024) due to suboptimal body habitus.  Continue weight loss efforts.  We started HA series with US guidance due to suboptimal body habitus.  Orders:    Large joint arthrocentesis: L knee    No follow-ups on file.    Patient is in agreement with the above plan.    HPI:  Janette Glod is a 59 y.o. female  who presents in follow up.  Here for   Chief Complaint   Patient presents with    Left Knee - Pain, Follow-up       Since last visit: See above.    Following history reviewed and updated:  Past Medical History[1]  Past Surgical History[2]  Social History   Social History     Substance and Sexual Activity   Alcohol Use Not Currently    Comment: rarely     Social History     Substance and Sexual Activity   Drug Use Not Currently     Tobacco Use History[3]  Family History[4]  Allergies[5]     Constitutional:  Wt (!) 162 kg (357 lb)   BMI 63.24 kg/m²    General: NAD.  Eyes: Clear sclerae.  ENT: No inflammation, lesion, or mass of lips.  No tracheal deviation.  Musculoskeletal: As mentioned below.  Integumentary: No visible rashes or skin lesions.  Pulmonary/Chest: Effort normal. No respiratory distress.   Neuro: CN's grossly intact, GONZALEZ.  Psych: Normal affect and judgement.  Vascular: WWP.    Left Knee Exam     Range of Motion   Extension:  normal     Other   Erythema: absent  Scars: absent  Sensation: normal  Pulse: present  Swelling: none  Effusion: no effusion present             Large joint arthrocentesis: L knee    Performed by: Jarocho Escobar DO  Authorized by: Jarocho Escobar DO    Universal Protocol:  procedure performed by consultantConsent: Verbal consent obtained. Written consent not obtained  Risks and benefits: risks, benefits and alternatives were discussed  Consent given by: patient  Timeout called  at: 2025 9:22 AM.  Patient understanding: patient states understanding of the procedure being performed  Site marked: the operative site was marked  Radiology Images displayed and confirmed. If images not available, report reviewed: imaging studies available  Patient identity confirmed: verbally with patient  Supporting Documentation  Indications: pain     Is this a Visco injection? Yes  Non-Pharmacologic Treatments Attempted: Home Exercise  Pharmacologic Treatments Attempted: d  Pain Score: 9Procedure Details  Location: knee - L knee  Needle size: 22 G  Ultrasound guidance: yes  Approach: Inferolateral to patella.  Medications administered: 30 mg sodium hyaluronate 30 mg/2 mL    Patient tolerance: patient tolerated the procedure well with no immediate complications  Dressing:  Sterile dressing applied    Risks of this procedure include:    - Risk of bleeding since a needle is involved.  - Risk of infection (1/10,000 chance as per recent studies).  Signs/symptoms were discussed and they would prompt an urgent evaluation at an emergency department.  - Risk of synovitis from the viscosupplementation.    The benefits outweigh the risks and so we proceeded with the procedure.                 [1]   Past Medical History:  Diagnosis Date    Anxiety     Asthma     Chronic fatigue     COPD (chronic obstructive pulmonary disease) ([facility])     Depression     Fibromyalgia     Head injury     Hypothyroid     Morbid obesity with BMI of 60.0-69.9, adult ([facility])     Sleep apnea     PT states a very mild case, prescribed CPAP but does not use machine.   [2]   Past Surgical History:  Procedure Laterality Date    APPENDECTOMY       SECTION      ESOPHAGOGASTRODUODENOSCOPY      HERNIA REPAIR      Primary repair of incarcerated umbilical hernia - Dr. Bloch     HYSTERECTOMY      Hysterectomy/revise vagina    KNEE SURGERY      Arthrotomy with open meniscus repair     LAPAROSCOPIC CHOLECYSTECTOMY      TONSILECTOMY AND ADNOIDECTOMY       TUMOR REMOVAL      head    URETER SURGERY      repair    WISDOM TOOTH EXTRACTION     [3]   Social History  Tobacco Use   Smoking Status Former    Types: Cigarettes   Smokeless Tobacco Never   Tobacco Comments    3= PPD - As per Gauri    [4]   Family History  Problem Relation Name Age of Onset    Depression Mother      Anxiety disorder Mother      No Known Problems Father      No Known Problems Sister      No Known Problems Maternal Grandmother      No Known Problems Maternal Grandfather      Breast cancer Paternal Grandmother      Esophageal cancer Paternal Grandfather      Skin cancer Paternal Grandfather      Diabetes Brother      Hypertension Brother      No Known Problems Son      Diabetes Family      Heart disease Family      Stroke Neg Hx      Thyroid disease Neg Hx     [5]   Allergies  Allergen Reactions    Ciprofloxacin Other (See Comments)     Reaction Date: 03May2011;       Penicillins Other (See Comments)    Advil [Ibuprofen] Hives and Rash

## 2025-06-12 NOTE — ASSESSMENT & PLAN NOTE
Patient is here in follow up of left knee osteoarthritis.  The patient is a good candidate for repeat ultrasound guided left knee IA injection (last 10/2024) due to suboptimal body habitus.  Continue weight loss efforts.  We started HA series with US guidance due to suboptimal body habitus.  Orders:    Large joint arthrocentesis: L knee

## 2025-06-19 ENCOUNTER — PROCEDURE VISIT (OUTPATIENT)
Dept: OBGYN CLINIC | Facility: MEDICAL CENTER | Age: 60
End: 2025-06-19
Payer: MEDICARE

## 2025-06-19 DIAGNOSIS — M17.12 PRIMARY OSTEOARTHRITIS OF LEFT KNEE: Primary | ICD-10-CM

## 2025-06-19 PROCEDURE — 20610 DRAIN/INJ JOINT/BURSA W/O US: CPT | Performed by: PHYSICAL MEDICINE & REHABILITATION

## 2025-06-19 NOTE — PROGRESS NOTES
Assessment & Plan  Primary osteoarthritis of left knee  Patient is here in follow up of left knee osteoarthritis.  The patient is a good candidate for repeat ultrasound guided left knee IA injection (last 10/2024) due to suboptimal body habitus.  Continue weight loss efforts.  We continued HA series. Ultrasound not used today.  Able to use previous injection site.  Orders:    Large joint arthrocentesis: L knee     No follow-ups on file.    Patient is in agreement with the above plan.    HPI:  Janette Gold is a 59 y.o. female  who presents in follow up.  Here for   Chief Complaint   Patient presents with    Left Knee - Pain, Injections     Orthovisc #2       Since last visit: See above.    Following history reviewed and updated:  Past Medical History[1]  Past Surgical History[2]  Social History   Social History     Substance and Sexual Activity   Alcohol Use Not Currently    Comment: rarely     Social History     Substance and Sexual Activity   Drug Use Not Currently     Tobacco Use History[3]  Family History[4]  Allergies[5]     Constitutional:  There were no vitals taken for this visit.   General: NAD.  Eyes: Clear sclerae.  ENT: No inflammation, lesion, or mass of lips.  No tracheal deviation.  Musculoskeletal: As mentioned below.  Integumentary: No visible rashes or skin lesions.  Pulmonary/Chest: Effort normal. No respiratory distress.   Neuro: CN's grossly intact, GONZALEZ.  Psych: Normal affect and judgement.  Vascular: WWP.    Left Knee Exam     Range of Motion   Extension:  normal     Other   Erythema: absent  Scars: absent  Sensation: normal  Pulse: present  Swelling: none  Effusion: no effusion present             Large joint arthrocentesis: L knee    Performed by: Jarocho Escobar DO  Authorized by: Jarocho Escobar DO    Universal Protocol:  procedure performed by consultantConsent: Verbal consent obtained. Written consent not obtained  Risks and benefits: risks, benefits and alternatives were  discussed  Consent given by: patient  Timeout called at: 2025 9:22 AM.  Patient understanding: patient states understanding of the procedure being performed  Site marked: the operative site was marked  Radiology Images displayed and confirmed. If images not available, report reviewed: imaging studies available  Patient identity confirmed: verbally with patient  Supporting Documentation  Indications: pain     Is this a Visco injection? Yes  Non-Pharmacologic Treatments Attempted: Home Exercise  Pharmacologic Treatments Attempted: d  Pain Score: 9Procedure Details  Location: knee - L knee  Needle size: 22 G  Ultrasound guidance: no  Approach: Inferolateral to patella.  Medications administered: 30 mg sodium hyaluronate 30 mg/2 mL    Patient tolerance: patient tolerated the procedure well with no immediate complications  Dressing:  Sterile dressing applied    Risks of this procedure include:    - Risk of bleeding since a needle is involved.  - Risk of infection (1/10,000 chance as per recent studies).  Signs/symptoms were discussed and they would prompt an urgent evaluation at an emergency department.  - Risk of synovitis from the viscosupplementation.    The benefits outweigh the risks and so we proceeded with the procedure.                 [1]   Past Medical History:  Diagnosis Date    Anxiety     Asthma     Chronic fatigue     COPD (chronic obstructive pulmonary disease) ([facility])     Depression     Fibromyalgia     Head injury     Hypothyroid     Morbid obesity with BMI of 60.0-69.9, adult ([facility])     Sleep apnea     PT states a very mild case, prescribed CPAP but does not use machine.   [2]   Past Surgical History:  Procedure Laterality Date    APPENDECTOMY       SECTION      ESOPHAGOGASTRODUODENOSCOPY      HERNIA REPAIR      Primary repair of incarcerated umbilical hernia - Dr. Bloch     HYSTERECTOMY      Hysterectomy/revise vagina    KNEE SURGERY      Arthrotomy with open meniscus repair     LAPAROSCOPIC  CHOLECYSTECTOMY      TONSILECTOMY AND ADNOIDECTOMY      TUMOR REMOVAL      head    URETER SURGERY      repair    WISDOM TOOTH EXTRACTION     [3]   Social History  Tobacco Use   Smoking Status Former    Types: Cigarettes   Smokeless Tobacco Never   Tobacco Comments    3= PPD - As per Gauri    [4]   Family History  Problem Relation Name Age of Onset    Depression Mother      Anxiety disorder Mother      No Known Problems Father      No Known Problems Sister      No Known Problems Maternal Grandmother      No Known Problems Maternal Grandfather      Breast cancer Paternal Grandmother      Esophageal cancer Paternal Grandfather      Skin cancer Paternal Grandfather      Diabetes Brother      Hypertension Brother      No Known Problems Son      Diabetes Family      Heart disease Family      Stroke Neg Hx      Thyroid disease Neg Hx     [5]   Allergies  Allergen Reactions    Ciprofloxacin Other (See Comments)     Reaction Date: 03May2011;       Penicillins Other (See Comments)    Advil [Ibuprofen] Hives and Rash

## 2025-06-19 NOTE — ASSESSMENT & PLAN NOTE
Patient is here in follow up of left knee osteoarthritis.  The patient is a good candidate for repeat ultrasound guided left knee IA injection (last 10/2024) due to suboptimal body habitus.  Continue weight loss efforts.  We continued HA series. Ultrasound not used today.  Able to use previous injection site.  Orders:    Large joint arthrocentesis: L knee

## 2025-06-26 ENCOUNTER — PROCEDURE VISIT (OUTPATIENT)
Dept: OBGYN CLINIC | Facility: MEDICAL CENTER | Age: 60
End: 2025-06-26
Payer: MEDICARE

## 2025-06-26 DIAGNOSIS — M17.12 PRIMARY OSTEOARTHRITIS OF LEFT KNEE: Primary | ICD-10-CM

## 2025-06-26 PROCEDURE — 20610 DRAIN/INJ JOINT/BURSA W/O US: CPT | Performed by: PHYSICAL MEDICINE & REHABILITATION

## 2025-06-26 NOTE — PROGRESS NOTES
Assessment & Plan  Primary osteoarthritis of left knee  Patient is here in follow up of left knee osteoarthritis.  The patient is a good candidate for repeat ultrasound guided left knee IA injection (last 10/2024) due to suboptimal body habitus.  Continue weight loss efforts.  We completed HA series. Ultrasound not used today.  Able to use previous injection site.  If she needs an injection for steroid or Visco in the future, would likely need to use ultrasound guidance again.    Will see her back in 6 to 8 weeks if needed.  If his her right knee is still symptomatic, we will obtain BILATERAL knee x-rays.  Orders:    Large joint arthrocentesis: L knee     No follow-ups on file.    Patient is in agreement with the above plan.    HPI:  Janette Gold is a 59 y.o. female  who presents in follow up.  Here for   Chief Complaint   Patient presents with    Left Knee - Pain, Injections     Orthovisc #3       Since last visit: See above.    Following history reviewed and updated:  Past Medical History[1]  Past Surgical History[2]  Social History   Social History     Substance and Sexual Activity   Alcohol Use Not Currently    Comment: rarely     Social History     Substance and Sexual Activity   Drug Use Not Currently     Tobacco Use History[3]  Family History[4]  Allergies[5]     Constitutional:  There were no vitals taken for this visit.   General: NAD.  Eyes: Clear sclerae.  ENT: No inflammation, lesion, or mass of lips.  No tracheal deviation.  Musculoskeletal: As mentioned below.  Integumentary: No visible rashes or skin lesions.  Pulmonary/Chest: Effort normal. No respiratory distress.   Neuro: CN's grossly intact, GONZALEZ.  Psych: Normal affect and judgement.  Vascular: WWP.    Left Knee Exam     Range of Motion   Extension:  normal     Other   Erythema: absent  Scars: absent  Sensation: normal  Pulse: present  Swelling: none  Effusion: no effusion present             Large joint arthrocentesis: L knee    Performed by:  Jarocho Escobar DO  Authorized by: Jarocho Escobar DO    Universal Protocol:  procedure performed by consultantConsent: Verbal consent obtained. Written consent not obtained  Risks and benefits: risks, benefits and alternatives were discussed  Consent given by: patient  Timeout called at: 2025 9:22 AM.  Patient understanding: patient states understanding of the procedure being performed  Site marked: the operative site was marked  Radiology Images displayed and confirmed. If images not available, report reviewed: imaging studies available  Patient identity confirmed: verbally with patient  Supporting Documentation  Indications: pain     Is this a Visco injection? Yes  Non-Pharmacologic Treatments Attempted: Home Exercise  Pharmacologic Treatments Attempted: d  Pain Score: 9Procedure Details  Location: knee - L knee  Needle size: 22 G  Ultrasound guidance: no  Approach: Inferolateral to patella.  Medications administered: 30 mg sodium hyaluronate 30 mg/2 mL    Patient tolerance: patient tolerated the procedure well with no immediate complications  Dressing:  Sterile dressing applied    Risks of this procedure include:    - Risk of bleeding since a needle is involved.  - Risk of infection (1/10,000 chance as per recent studies).  Signs/symptoms were discussed and they would prompt an urgent evaluation at an emergency department.  - Risk of synovitis from the viscosupplementation.    The benefits outweigh the risks and so we proceeded with the procedure.                 [1]   Past Medical History:  Diagnosis Date    Anxiety     Asthma     Chronic fatigue     COPD (chronic obstructive pulmonary disease) ([facility])     Depression     Fibromyalgia     Head injury     Hypothyroid     Morbid obesity with BMI of 60.0-69.9, adult ([facility])     Sleep apnea     PT states a very mild case, prescribed CPAP but does not use machine.   [2]   Past Surgical History:  Procedure Laterality Date    APPENDECTOMY       SECTION       ESOPHAGOGASTRODUODENOSCOPY      HERNIA REPAIR      Primary repair of incarcerated umbilical hernia - Dr. Bloch     HYSTERECTOMY      Hysterectomy/revise vagina    KNEE SURGERY      Arthrotomy with open meniscus repair     LAPAROSCOPIC CHOLECYSTECTOMY      TONSILECTOMY AND ADNOIDECTOMY      TUMOR REMOVAL      head    URETER SURGERY      repair    WISDOM TOOTH EXTRACTION     [3]   Social History  Tobacco Use   Smoking Status Former    Types: Cigarettes   Smokeless Tobacco Never   Tobacco Comments    3= PPD - As per Gauri    [4]   Family History  Problem Relation Name Age of Onset    Depression Mother      Anxiety disorder Mother      No Known Problems Father      No Known Problems Sister      No Known Problems Maternal Grandmother      No Known Problems Maternal Grandfather      Breast cancer Paternal Grandmother      Esophageal cancer Paternal Grandfather      Skin cancer Paternal Grandfather      Diabetes Brother      Hypertension Brother      No Known Problems Son      Diabetes Family      Heart disease Family      Stroke Neg Hx      Thyroid disease Neg Hx     [5]   Allergies  Allergen Reactions    Ciprofloxacin Other (See Comments)     Reaction Date: 03May2011;       Penicillins Other (See Comments)    Advil [Ibuprofen] Hives and Rash

## 2025-06-26 NOTE — ASSESSMENT & PLAN NOTE
Patient is here in follow up of left knee osteoarthritis.  The patient is a good candidate for repeat ultrasound guided left knee IA injection (last 10/2024) due to suboptimal body habitus.  Continue weight loss efforts.  We completed HA series. Ultrasound not used today.  Able to use previous injection site.  If she needs an injection for steroid or Visco in the future, would likely need to use ultrasound guidance again.    Will see her back in 6 to 8 weeks if needed.  If his her right knee is still symptomatic, we will obtain BILATERAL knee x-rays.  Orders:    Large joint arthrocentesis: L knee

## 2025-08-06 ENCOUNTER — APPOINTMENT (OUTPATIENT)
Dept: RADIOLOGY | Facility: MEDICAL CENTER | Age: 60
End: 2025-08-06
Attending: PHYSICAL MEDICINE & REHABILITATION
Payer: MEDICARE

## 2025-08-06 ENCOUNTER — OFFICE VISIT (OUTPATIENT)
Dept: OBGYN CLINIC | Facility: MEDICAL CENTER | Age: 60
End: 2025-08-06
Payer: MEDICARE

## 2025-08-06 VITALS — BODY MASS INDEX: 51.91 KG/M2 | WEIGHT: 293 LBS | HEIGHT: 63 IN

## 2025-08-06 DIAGNOSIS — M25.561 CHRONIC PAIN OF BOTH KNEES: ICD-10-CM

## 2025-08-06 DIAGNOSIS — M25.562 CHRONIC PAIN OF BOTH KNEES: ICD-10-CM

## 2025-08-06 DIAGNOSIS — M17.12 PRIMARY OSTEOARTHRITIS OF LEFT KNEE: Primary | ICD-10-CM

## 2025-08-06 DIAGNOSIS — G89.29 CHRONIC PAIN OF BOTH KNEES: ICD-10-CM

## 2025-08-06 PROCEDURE — 99214 OFFICE O/P EST MOD 30 MIN: CPT | Performed by: PHYSICAL MEDICINE & REHABILITATION
